# Patient Record
Sex: MALE | Race: WHITE | NOT HISPANIC OR LATINO | Employment: OTHER | URBAN - METROPOLITAN AREA
[De-identification: names, ages, dates, MRNs, and addresses within clinical notes are randomized per-mention and may not be internally consistent; named-entity substitution may affect disease eponyms.]

---

## 2017-06-06 ENCOUNTER — ALLSCRIPTS OFFICE VISIT (OUTPATIENT)
Dept: OTHER | Facility: OTHER | Age: 57
End: 2017-06-06

## 2017-06-06 DIAGNOSIS — Z13.6 ENCOUNTER FOR SCREENING FOR CARDIOVASCULAR DISORDERS: ICD-10-CM

## 2017-06-06 DIAGNOSIS — Z12.11 ENCOUNTER FOR SCREENING FOR MALIGNANT NEOPLASM OF COLON: ICD-10-CM

## 2017-06-06 DIAGNOSIS — F10.20 UNCOMPLICATED ALCOHOL DEPENDENCE (HCC): ICD-10-CM

## 2017-06-06 DIAGNOSIS — I10 ESSENTIAL (PRIMARY) HYPERTENSION: ICD-10-CM

## 2017-06-07 ENCOUNTER — APPOINTMENT (OUTPATIENT)
Dept: LAB | Facility: CLINIC | Age: 57
End: 2017-06-07
Payer: MEDICARE

## 2017-06-07 ENCOUNTER — TRANSCRIBE ORDERS (OUTPATIENT)
Dept: LAB | Facility: CLINIC | Age: 57
End: 2017-06-07

## 2017-06-07 DIAGNOSIS — Z13.6 ENCOUNTER FOR SCREENING FOR CARDIOVASCULAR DISORDERS: ICD-10-CM

## 2017-06-07 DIAGNOSIS — F10.20 UNCOMPLICATED ALCOHOL DEPENDENCE (HCC): ICD-10-CM

## 2017-06-07 DIAGNOSIS — I10 ESSENTIAL (PRIMARY) HYPERTENSION: ICD-10-CM

## 2017-06-07 LAB
ALBUMIN SERPL BCP-MCNC: 3.8 G/DL (ref 3.5–5)
ALP SERPL-CCNC: 70 U/L (ref 46–116)
ALT SERPL W P-5'-P-CCNC: 25 U/L (ref 12–78)
ANION GAP SERPL CALCULATED.3IONS-SCNC: 8 MMOL/L (ref 4–13)
AST SERPL W P-5'-P-CCNC: 20 U/L (ref 5–45)
BASOPHILS # BLD AUTO: 0.07 THOUSANDS/ΜL (ref 0–0.1)
BASOPHILS NFR BLD AUTO: 1 % (ref 0–1)
BILIRUB SERPL-MCNC: 0.44 MG/DL (ref 0.2–1)
BUN SERPL-MCNC: 12 MG/DL (ref 5–25)
CALCIUM SERPL-MCNC: 9.6 MG/DL (ref 8.3–10.1)
CHLORIDE SERPL-SCNC: 95 MMOL/L (ref 100–108)
CHOLEST SERPL-MCNC: 216 MG/DL (ref 50–200)
CO2 SERPL-SCNC: 30 MMOL/L (ref 21–32)
CREAT SERPL-MCNC: 1.09 MG/DL (ref 0.6–1.3)
CREAT UR-MCNC: 66 MG/DL
EOSINOPHIL # BLD AUTO: 1.31 THOUSAND/ΜL (ref 0–0.61)
EOSINOPHIL NFR BLD AUTO: 12 % (ref 0–6)
ERYTHROCYTE [DISTWIDTH] IN BLOOD BY AUTOMATED COUNT: 12.4 % (ref 11.6–15.1)
EST. AVERAGE GLUCOSE BLD GHB EST-MCNC: 108 MG/DL
GFR SERPL CREATININE-BSD FRML MDRD: >60 ML/MIN/1.73SQ M
GLUCOSE P FAST SERPL-MCNC: 90 MG/DL (ref 65–99)
HBA1C MFR BLD: 5.4 % (ref 4.2–6.3)
HCT VFR BLD AUTO: 46.1 % (ref 36.5–49.3)
HDLC SERPL-MCNC: 60 MG/DL (ref 40–60)
HGB BLD-MCNC: 16.2 G/DL (ref 12–17)
LDLC SERPL CALC-MCNC: 138 MG/DL (ref 0–100)
LYMPHOCYTES # BLD AUTO: 3.91 THOUSANDS/ΜL (ref 0.6–4.47)
LYMPHOCYTES NFR BLD AUTO: 37 % (ref 14–44)
MCH RBC QN AUTO: 35.1 PG (ref 26.8–34.3)
MCHC RBC AUTO-ENTMCNC: 35.1 G/DL (ref 31.4–37.4)
MCV RBC AUTO: 100 FL (ref 82–98)
MICROALBUMIN UR-MCNC: 7.1 MG/L (ref 0–20)
MICROALBUMIN/CREAT 24H UR: 11 MG/G CREATININE (ref 0–30)
MONOCYTES # BLD AUTO: 1.03 THOUSAND/ΜL (ref 0.17–1.22)
MONOCYTES NFR BLD AUTO: 10 % (ref 4–12)
NEUTROPHILS # BLD AUTO: 4.22 THOUSANDS/ΜL (ref 1.85–7.62)
NEUTS SEG NFR BLD AUTO: 40 % (ref 43–75)
NRBC BLD AUTO-RTO: 0 /100 WBCS
PLATELET # BLD AUTO: 370 THOUSANDS/UL (ref 149–390)
PMV BLD AUTO: 9.8 FL (ref 8.9–12.7)
POTASSIUM SERPL-SCNC: 3.8 MMOL/L (ref 3.5–5.3)
PROT SERPL-MCNC: 8.4 G/DL (ref 6.4–8.2)
RBC # BLD AUTO: 4.62 MILLION/UL (ref 3.88–5.62)
SODIUM SERPL-SCNC: 133 MMOL/L (ref 136–145)
TRIGL SERPL-MCNC: 89 MG/DL
TSH SERPL DL<=0.05 MIU/L-ACNC: 1.2 UIU/ML (ref 0.36–3.74)
WBC # BLD AUTO: 10.57 THOUSAND/UL (ref 4.31–10.16)

## 2017-06-07 PROCEDURE — 84443 ASSAY THYROID STIM HORMONE: CPT

## 2017-06-07 PROCEDURE — 85025 COMPLETE CBC W/AUTO DIFF WBC: CPT

## 2017-06-07 PROCEDURE — 80061 LIPID PANEL: CPT

## 2017-06-07 PROCEDURE — 83036 HEMOGLOBIN GLYCOSYLATED A1C: CPT

## 2017-06-07 PROCEDURE — 82570 ASSAY OF URINE CREATININE: CPT

## 2017-06-07 PROCEDURE — 36415 COLL VENOUS BLD VENIPUNCTURE: CPT

## 2017-06-07 PROCEDURE — 82043 UR ALBUMIN QUANTITATIVE: CPT

## 2017-06-07 PROCEDURE — 80053 COMPREHEN METABOLIC PANEL: CPT

## 2017-06-12 ENCOUNTER — GENERIC CONVERSION - ENCOUNTER (OUTPATIENT)
Dept: OTHER | Facility: OTHER | Age: 57
End: 2017-06-12

## 2017-07-03 ENCOUNTER — ALLSCRIPTS OFFICE VISIT (OUTPATIENT)
Dept: OTHER | Facility: OTHER | Age: 57
End: 2017-07-03

## 2017-07-18 ENCOUNTER — ALLSCRIPTS OFFICE VISIT (OUTPATIENT)
Dept: OTHER | Facility: OTHER | Age: 57
End: 2017-07-18

## 2017-07-18 DIAGNOSIS — Z71.6 TOBACCO ABUSE COUNSELING: ICD-10-CM

## 2017-09-07 ENCOUNTER — GENERIC CONVERSION - ENCOUNTER (OUTPATIENT)
Dept: OTHER | Facility: OTHER | Age: 57
End: 2017-09-07

## 2017-09-07 DIAGNOSIS — E78.5 HYPERLIPIDEMIA: ICD-10-CM

## 2017-09-08 ENCOUNTER — APPOINTMENT (OUTPATIENT)
Dept: LAB | Facility: CLINIC | Age: 57
End: 2017-09-08
Payer: MEDICARE

## 2017-09-08 DIAGNOSIS — E78.5 HYPERLIPIDEMIA: ICD-10-CM

## 2017-09-08 LAB
ALBUMIN SERPL BCP-MCNC: 3.7 G/DL (ref 3.5–5)
ALP SERPL-CCNC: 74 U/L (ref 46–116)
ALT SERPL W P-5'-P-CCNC: 19 U/L (ref 12–78)
ANION GAP SERPL CALCULATED.3IONS-SCNC: 7 MMOL/L (ref 4–13)
AST SERPL W P-5'-P-CCNC: 19 U/L (ref 5–45)
BILIRUB SERPL-MCNC: 0.69 MG/DL (ref 0.2–1)
BUN SERPL-MCNC: 15 MG/DL (ref 5–25)
CALCIUM SERPL-MCNC: 9 MG/DL (ref 8.3–10.1)
CHLORIDE SERPL-SCNC: 98 MMOL/L (ref 100–108)
CHOLEST SERPL-MCNC: 153 MG/DL (ref 50–200)
CO2 SERPL-SCNC: 29 MMOL/L (ref 21–32)
CREAT SERPL-MCNC: 1.12 MG/DL (ref 0.6–1.3)
GFR SERPL CREATININE-BSD FRML MDRD: 72 ML/MIN/1.73SQ M
GLUCOSE P FAST SERPL-MCNC: 98 MG/DL (ref 65–99)
HDLC SERPL-MCNC: 70 MG/DL (ref 40–60)
LDLC SERPL CALC-MCNC: 69 MG/DL (ref 0–100)
POTASSIUM SERPL-SCNC: 3.6 MMOL/L (ref 3.5–5.3)
PROT SERPL-MCNC: 8.2 G/DL (ref 6.4–8.2)
SODIUM SERPL-SCNC: 134 MMOL/L (ref 136–145)
TRIGL SERPL-MCNC: 68 MG/DL

## 2017-09-08 PROCEDURE — 80061 LIPID PANEL: CPT

## 2017-09-08 PROCEDURE — 80053 COMPREHEN METABOLIC PANEL: CPT

## 2017-09-18 ENCOUNTER — GENERIC CONVERSION - ENCOUNTER (OUTPATIENT)
Dept: OTHER | Facility: OTHER | Age: 57
End: 2017-09-18

## 2018-01-10 NOTE — RESULT NOTES
Verified Results  (1) HEMOGLOBIN A1C 07Jun2017 07:53AM Siria Low    Order Number: [id number]     Test Name Result Flag Reference   HEMOGLOBIN A1C 5 4 %  4 2-6 3   EST  AVG  GLUCOSE 108 mg/dl       (1) LIPID PANEL FASTING W DIRECT LDL REFLEX [id number] 07:53AM [facility] Number: [id number]     Test Name Result Flag Reference   CHOLESTEROL 216 mg/dL H    LDL CHOLESTEROL CALCULATED 138 mg/dL H 0-100   Triglyceride:         Normal              <150 mg/dl       Borderline High    150-199 mg/dl       High               200-499 mg/dl       Very High          >499 mg/dl  Cholesterol:         Desirable        <200 mg/dl      Borderline High  200-239 mg/dl      High             >239 mg/dl  HDL Cholesterol:        High    >59 mg/dL      Low     <41 mg/dL  LDL Cholesterol:        Optimal          <100 mg/dl        Near Optimal     100-129 mg/dl        Above Optimal          Borderline High   130-159 mg/dl          High              160-189 mg/dl          Very High        >189 mg/dl  LDL CALCULATED:    This screening LDL is a calculated result  It does not have the accuracy of the Direct Measured LDL in the monitoring of patients with hyperlipidemia and/or statin therapy  Direct Measure LDL (ZWX122) must be ordered separately in these patients  TRIGLYCERIDES 89 mg/dL  <=150   Specimen collection should occur prior to N-Acetylcysteine or Metamizole administration due to the potential for falsely depressed results  HDL,DIRECT 60 mg/dL  40-60   Specimen collection should occur prior to Metamizole administration due to the potential for falsely depressed results  (1) TSH [id number] 07:53AM Siria Low    Order Number: [id number]     Test Name Result Flag Reference   TSH 1 200 uIU/mL  0 358-3 740   Patients undergoing fluorescein dye angiography may retain small amounts of fluorescein in the body for 48-72 hours post procedure   Samples containing fluorescein can produce falsely depressed TSH values  If the patient had this procedure,a specimen should be resubmitted post fluorescein clearance  (1) COMPREHENSIVE METABOLIC PANEL [id number] 77:78TQ Sarah Rosales    Order Number: [id number]     Test Name Result Flag Reference   SODIUM 133 mmol/L L 136-145   POTASSIUM 3 8 mmol/L  3 5-5 3   CHLORIDE 95 mmol/L L 100-108   CARBON DIOXIDE 30 mmol/L  21-32   ANION GAP (CALC) 8 mmol/L  4-13   BLOOD UREA NITROGEN 12 mg/dL  5-25   CREATININE 1 09 mg/dL  0 60-1 30   Standardized to IDMS reference method   CALCIUM 9 6 mg/dL  8 3-10 1   BILI, TOTAL 0 44 mg/dL  0 20-1 00   ALK PHOSPHATAS 70 U/L     ALT (SGPT) 25 U/L  12-78   AST(SGOT) 20 U/L  5-45   ALBUMIN 3 8 g/dL  3 5-5 0   TOTAL PROTEIN 8 4 g/dL H 6 4-8 2   eGFR Non-African American      >60 0 ml/min/1 73sq [facility] Disease Education Program recommendations are as follows:  GFR calculation is accurate only with a steady state creatinine  Chronic Kidney disease less than 60 ml/min/1 73 sq  meters  Kidney failure less than 15 ml/min/1 73 sq  meters  GLUCOSE FASTING 90 mg/dL  65-99     (1) CBC/PLT/DIFF [id number] 07:53AM [facility] Order Number: [id number]     Test Name Result Flag Reference   WBC COUNT 10 57 Thousand/uL H 4 31-10 16   RBC COUNT 4 62 Million/uL  3 88-5 62   HEMOGLOBIN 16 2 g/dL  12 0-17 0   HEMATOCRIT 46 1 %  36 5-49 3    fL H 82-98   MCH 35 1 pg H 26 8-34 3   MCHC 35 1 g/dL  31 4-37 4   RDW 12 4 %  11 6-15 1   MPV 9 8 fL  8 9-12 7   PLATELET COUNT 969 Thousands/uL  149-390   nRBC AUTOMATED 0 /100 WBCs     NEUTROPHILS RELATIVE PERCENT 40 % L 43-75   LYMPHOCYTES RELATIVE PERCENT 37 %  14-44   MONOCYTES RELATIVE PERCENT 10 %  4-12   EOSINOPHILS RELATIVE PERCENT 12 % H 0-6   BASOPHILS RELATIVE PERCENT 1 %  0-1   NEUTROPHILS ABSOLUTE COUNT 4 22 Thousands/? ??L  1 85-7 62   LYMPHOCYTES ABSOLUTE COUNT 3 91 Thousands/? ??L  0 60-4 47   MONOCYTES ABSOLUTE COUNT 1 03 Thousand/? ??L  0 17-1 22   EOSINOPHILS ABSOLUTE COUNT 1 31 Thousand/? ??L H 0 00-0 61   BASOPHILS ABSOLUTE COUNT 0 07 Thousands/? ??L  0 00-0 10     (1) MICROALBUMIN CREATININE RATIO, RANDOM URINE 07Jun2017 07:53AM Fartun Reynoso Order Number: [id number]     Test Name Result Flag Reference   MICROALBUMIN/ CREAT R 11 mg/g creatinine  0-30   MICROALBUMIN,URINE 7 1 mg/L  0 0-20 0   CREATININE URINE 66 0 mg/dL

## 2018-01-12 VITALS
RESPIRATION RATE: 16 BRPM | OXYGEN SATURATION: 97 % | SYSTOLIC BLOOD PRESSURE: 118 MMHG | HEIGHT: 70 IN | HEART RATE: 78 BPM | TEMPERATURE: 96.5 F | DIASTOLIC BLOOD PRESSURE: 70 MMHG | BODY MASS INDEX: 23.48 KG/M2 | WEIGHT: 164 LBS

## 2018-01-13 VITALS
WEIGHT: 165 LBS | DIASTOLIC BLOOD PRESSURE: 80 MMHG | OXYGEN SATURATION: 98 % | BODY MASS INDEX: 23.62 KG/M2 | RESPIRATION RATE: 18 BRPM | HEART RATE: 62 BPM | HEIGHT: 70 IN | SYSTOLIC BLOOD PRESSURE: 130 MMHG
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
## 2018-01-13 NOTE — MISCELLANEOUS
Message  I spoke to patient re labs will f/u with pcp      Signatures   Electronically signed by : Priscille Seip, M D ; Sep 18 2017 12:42PM EST                       (Author)

## 2018-01-14 VITALS
WEIGHT: 167 LBS | SYSTOLIC BLOOD PRESSURE: 122 MMHG | HEART RATE: 72 BPM | RESPIRATION RATE: 16 BRPM | DIASTOLIC BLOOD PRESSURE: 72 MMHG | BODY MASS INDEX: 23.91 KG/M2 | HEIGHT: 70 IN | TEMPERATURE: 97.8 F

## 2018-01-16 NOTE — RESULT NOTES
Message  Call patient regarding CMP results  Discussed with patient regarding normal results except for elevated creatinine at 1 34  Patient states that he has not been drinking enough water in the last few weeks  I advised patient that he should drink at least 8-10 glasses of water daily  Patient states that he likes to drink Gatorade  I advised patient that Gatorade has a lot of sugar and if he really wants to he can dilute it otherwise he should drink just water  Patient today denies any swelling of the legs, chest pain, decreased urination, or shortness of breath  Advised patient to return to clinic if he develops any new symptoms  Patient acknowledged all information  Verified Results  (1) COMPREHENSIVE METABOLIC PANEL [id number] 84:47ZM Raine Merino Order Number: [id number]     Test Name Result Flag Reference   GLUCOSE,RANDM 89 mg/dL     If the patient is fasting, the ADA then defines impaired fasting glucose as > 100 mg/dL and diabetes as > or equal to 123 mg/dL  SODIUM 138 mmol/L  136-145   POTASSIUM 3 8 mmol/L  3 5-5 3   CHLORIDE 102 mmol/L  100-108   CARBON DIOXIDE 28 mmol/L  21-32   ANION GAP (CALC) 8 mmol/L  4-13   BLOOD UREA NITROGEN 20 mg/dL  5-25   CREATININE 1 34 mg/dL H 0 60-1 30   Standardized to IDMS reference method   CALCIUM 9 6 mg/dL  8 3-10 1   BILI, TOTAL 0 62 mg/dL  0 20-1 00   ALK PHOSPHATAS 71 U/L     ALT (SGPT) 24 U/L  12-78   AST(SGOT) 17 U/L  5-45   ALBUMIN 3 9 g/dL  3 5-5 0   TOTAL PROTEIN 8 4 g/dL H 6 4-8 2   eGFR Non-African American 54 9 ml/min/1 73sq [facility] Disease Education Program recommendations are as follows:  GFR calculation is accurate only with a steady state creatinine  Chronic Kidney disease less than 60 ml/min/1 73 sq  meters  Kidney failure less than 15 ml/min/1 73 sq  meters  Signatures   Electronically signed by :  ALBER Fortune ; Dec 13 2016  5:25PM EST                       (Author)

## 2018-01-22 VITALS
WEIGHT: 166 LBS | HEART RATE: 70 BPM | RESPIRATION RATE: 18 BRPM | SYSTOLIC BLOOD PRESSURE: 122 MMHG | HEIGHT: 70 IN | OXYGEN SATURATION: 99 % | TEMPERATURE: 97.6 F | DIASTOLIC BLOOD PRESSURE: 80 MMHG | BODY MASS INDEX: 23.77 KG/M2

## 2018-04-03 ENCOUNTER — OFFICE VISIT (OUTPATIENT)
Dept: FAMILY MEDICINE CLINIC | Facility: CLINIC | Age: 58
End: 2018-04-03
Payer: MEDICARE

## 2018-04-03 ENCOUNTER — TELEPHONE (OUTPATIENT)
Dept: GASTROENTEROLOGY | Facility: CLINIC | Age: 58
End: 2018-04-03

## 2018-04-03 VITALS
TEMPERATURE: 98.1 F | BODY MASS INDEX: 24.09 KG/M2 | HEIGHT: 70 IN | DIASTOLIC BLOOD PRESSURE: 68 MMHG | OXYGEN SATURATION: 98 % | HEART RATE: 66 BPM | WEIGHT: 168.31 LBS | RESPIRATION RATE: 18 BRPM | SYSTOLIC BLOOD PRESSURE: 120 MMHG

## 2018-04-03 DIAGNOSIS — F10.29 ALCOHOL DEPENDENCE WITH UNSPECIFIED ALCOHOL-INDUCED DISORDER (HCC): ICD-10-CM

## 2018-04-03 DIAGNOSIS — I10 HYPERTENSION, UNSPECIFIED TYPE: ICD-10-CM

## 2018-04-03 DIAGNOSIS — R53.83 FATIGUE, UNSPECIFIED TYPE: Primary | ICD-10-CM

## 2018-04-03 DIAGNOSIS — Z12.11 SCREENING FOR COLON CANCER: ICD-10-CM

## 2018-04-03 PROBLEM — R59.9 ENLARGED LYMPH NODES: Status: ACTIVE | Noted: 2017-06-06

## 2018-04-03 PROBLEM — E78.5 HYPERLIPIDEMIA: Status: ACTIVE | Noted: 2017-07-06

## 2018-04-03 PROBLEM — Z72.0 TOBACCO ABUSE: Status: ACTIVE | Noted: 2017-03-06

## 2018-04-03 PROCEDURE — 99213 OFFICE O/P EST LOW 20 MIN: CPT | Performed by: FAMILY MEDICINE

## 2018-04-03 RX ORDER — LOVASTATIN 20 MG/1
40 TABLET ORAL DAILY
COMMUNITY
Start: 2017-07-18 | End: 2018-05-04

## 2018-04-03 RX ORDER — CITALOPRAM 20 MG/1
20 TABLET ORAL EVERY MORNING
COMMUNITY
Start: 2016-06-03 | End: 2018-07-07 | Stop reason: SDUPTHER

## 2018-04-03 NOTE — ASSESSMENT & PLAN NOTE
Blood pressure is at goal today  Will continue lisinopril-hydrochlorothiazide 20-25 mg daily  Will reorder metoprolol tartrate 25 mg b i d  with refills

## 2018-04-03 NOTE — TELEPHONE ENCOUNTER
Radha Gonzalez  1960  [address] [facility] [address] [facility]  [phone]  Cell Phone [phone]    Screened by: [facility]    Referring Dr : Carlito Toure    Pre- Screening:   Has patient been referred for a routine screening Colonoscopy? yes  Is the patient over 48years of age? yes    If the answer is YES to both questions, proceed to the medical questions  Do you have any of the following symptoms? Have you had a coronary or vascular stent within the last year? no    Have you had a heart attack or stroke in the last 6 months? no    Have you had intestinal surgery in the last 3 months? no    Do you have problems with:    Do you use:  Oxygen no  CPAP/BiPAP no    Have you been hospitalized in the last Month? no    Have you been diagnosed with a bleeding disorder or anemia? no    Have you had chest pain (angina) or breathing problems  (COPD) in the last 3 months? no     Do you have any difficulty walking up a flight of stairs? no    Have you had Kidney failure or insufficiency? no    Have you had heart valve surgery? no    Are you confined to a wheelchair? no    Do you take     Do you take insulin for Diabetes no  Name of medication:    : If patient answers NO to medical questions, then schedule procedure  If patient answers YES to medical questions, then schedule office appointment  Previous Colonoscopy no   (if yes) Date and Facility of last colonoscopy? Patient scheduled for procedure:   Scheduled by:     Time:   Provider:   Location:     Insurance:   Referral Required? Were instructions Mailed? Were instructions sent to [employer] network:   Was the prep sent to Pharmacy?      Comments: Nelida Hernandez

## 2018-04-03 NOTE — PROGRESS NOTES
Assessment/Plan:    Hypertension  Blood pressure is at goal today  Will continue lisinopril-hydrochlorothiazide 20-25 mg daily  Will reorder metoprolol tartrate 25 mg b i d  with refills  Fatigue  Fatigue is likely 2/2 to alcohol abuse and over sleeping  For completeness will check TSH, CBC, BMP to rule out any thyroid, anemia, electrolyte disorder  I counseled patient on alcohol cessation  I also counseled patient on sleeping 8-10 hours a night  I counseled patient on short-term goals throughout the day to fill this time  I provided patient with pamphlet with contact information for family guidance and [address] for mental health counseling services  Will follow up in 1 month for evaluation of patient's progress  Alcohol dependence with unspecified alcohol-induced disorder ([facility] [address] )  Patient drinks between 8-10 beers nightly  This has been a chronic issue  I counseled patient that he should consider alcohol cessation  Patient is contemplating quitting alcohol  He understands and acknowledges that alcohol may be a component of his fatigue  Patient agrees that he should attempt to wean himself off alcohol  The patient is contemplating but not yet ready for active plan for alcohol cessation  Patient declines Alcohol anonymous services or other group counseling services  Advised that if patient does decide quit he should not abruptly stop alcohol intake as this can lead to withdrawal and serious side effects such as delirium tremens, seizures, and ultimately death  Will follow up in 1 month for evaluation  Diagnoses and all orders for this visit:    Fatigue, unspecified type  -     TSH, 3rd generation with T4 reflex; Future  -     Basic metabolic panel; Future  -     CBC and Platelet; Future    Screening for colon cancer  -     Ambulatory referral to Gastroenterology; Future  -     Ambulatory referral to Gastroenterology;  Future    Hypertension, unspecified type  -     metoprolol tartrate (LOPRESSOR) 25 mg tablet; Take 1 tablet (25 mg total) by mouth 2 (two) times a day  -     Basic metabolic panel; Future  -     CBC and Platelet; Future    Alcohol dependence with unspecified alcohol-induced disorder ([facility] [address] )    Other orders  -     lovastatin (MEVACOR) 20 mg tablet; Take 2 tablets by mouth daily  -     citalopram (CeleXA) 20 mg tablet; Take 1 tablet by mouth daily          Subjective:      Patient ID: Maria Esther Sanchez is a 61 y o  male  57-y/o male, PMHx hypertension, anxiety with social phobia, severe depression, presents to clinic for evaluation of fatigue  Patient states that he has been tired most days  Patient reports that he drinks 8-10 beers and night  Patient states that he will sleep upwards of 15 hours a day  Patient has no problem getting to sleep  He will wake up intermittently in the morning and fall back asleep for a couple more hours at a time  Patient is on disability and he states that he really just does not have anything to do so he spends his days drinking alcohol on sleeping  Patient is also here for evaluation of blood pressure  He states that he is compliant with his medication  He takes his blood pressure at home with readings in 120s over 60s  The following portions of the patient's history were reviewed and updated as appropriate: allergies, current medications, past family history, past medical history, past social history, past surgical history and problem list     Review of Systems   Constitutional: Negative for activity change, appetite change, chills, diaphoresis, fatigue and fever  HENT: Negative  Respiratory: Negative  Cardiovascular: Negative  Gastrointestinal: Negative  Endocrine: Negative for cold intolerance, heat intolerance, polydipsia, polyphagia and polyuria  Genitourinary: Negative  Musculoskeletal: Negative  Skin: Negative  Allergic/Immunologic: Negative  Neurological: Negative  Hematological: Negative  Psychiatric/Behavioral: Positive for decreased concentration and dysphoric mood  Negative for agitation, behavioral problems, confusion, hallucinations, self-injury, sleep disturbance and suicidal ideas  The patient is nervous/anxious  The patient is not hyperactive  Objective:      /68 (BP Location: Left arm, Patient Position: Sitting)   Pulse 66   Temp 98 1 °F (36 7 °C) (Tympanic)   Resp 18   Ht 5' 10" (1 778 m)   Wt 76 3 kg (168 lb 5 oz)   SpO2 98%   BMI 24 15 kg/m²          Physical Exam   Constitutional: He is oriented to person, place, and time  He appears well-developed and well-nourished  No distress  HENT:   Head: Normocephalic and atraumatic  Right Ear: External ear normal    Left Ear: External ear normal    Mouth/Throat: No oropharyngeal exudate  Eyes: Conjunctivae and EOM are normal  Pupils are equal, round, and reactive to light  No scleral icterus  Neck: Normal range of motion  Neck supple  No JVD present  No tracheal deviation present  No thyromegaly present  Cardiovascular: Normal rate, regular rhythm and normal heart sounds  Exam reveals no gallop and no friction rub  No murmur heard  Pulmonary/Chest: Effort normal and breath sounds normal  No stridor  No respiratory distress  He has no wheezes  He has no rales  Lymphadenopathy:     He has no cervical adenopathy  Neurological: He is alert and oriented to person, place, and time  Skin: He is not diaphoretic

## 2018-04-03 NOTE — ASSESSMENT & PLAN NOTE
Fatigue is likely 2/2 to alcohol abuse and over sleeping  For completeness will check TSH, CBC, BMP to rule out any thyroid, anemia, electrolyte disorder  I counseled patient on alcohol cessation  I also counseled patient on sleeping 8-10 hours a night  I counseled patient on short-term goals throughout the day to fill this time  I provided patient with pamphlet with contact information for family guidance and [address] for mental health counseling services  Will follow up in 1 month for evaluation of patient's progress

## 2018-04-03 NOTE — ASSESSMENT & PLAN NOTE
Patient drinks between 8-10 beers nightly  This has been a chronic issue  I counseled patient that he should consider alcohol cessation  Patient is contemplating quitting alcohol  He understands and acknowledges that alcohol may be a component of his fatigue  Patient agrees that he should attempt to wean himself off alcohol  The patient is contemplating but not yet ready for active plan for alcohol cessation  Patient declines Alcohol anonymous services or other group counseling services  Advised that if patient does decide quit he should not abruptly stop alcohol intake as this can lead to withdrawal and serious side effects such as delirium tremens, seizures, and ultimately death  Will follow up in 1 month for evaluation

## 2018-04-04 ENCOUNTER — APPOINTMENT (OUTPATIENT)
Dept: LAB | Facility: CLINIC | Age: 58
End: 2018-04-04
Payer: MEDICARE

## 2018-04-04 DIAGNOSIS — I10 HYPERTENSION, UNSPECIFIED TYPE: ICD-10-CM

## 2018-04-04 DIAGNOSIS — R53.83 FATIGUE, UNSPECIFIED TYPE: ICD-10-CM

## 2018-04-04 LAB
ANION GAP SERPL CALCULATED.3IONS-SCNC: 4 MMOL/L (ref 4–13)
BUN SERPL-MCNC: 12 MG/DL (ref 5–25)
CALCIUM SERPL-MCNC: 9 MG/DL (ref 8.3–10.1)
CHLORIDE SERPL-SCNC: 99 MMOL/L (ref 100–108)
CO2 SERPL-SCNC: 31 MMOL/L (ref 21–32)
CREAT SERPL-MCNC: 1.01 MG/DL (ref 0.6–1.3)
ERYTHROCYTE [DISTWIDTH] IN BLOOD BY AUTOMATED COUNT: 12.6 % (ref 11.6–15.1)
GFR SERPL CREATININE-BSD FRML MDRD: 82 ML/MIN/1.73SQ M
GLUCOSE P FAST SERPL-MCNC: 73 MG/DL (ref 65–99)
HCT VFR BLD AUTO: 41 % (ref 36.5–49.3)
HGB BLD-MCNC: 14.8 G/DL (ref 12–17)
MCH RBC QN AUTO: 34.7 PG (ref 26.8–34.3)
MCHC RBC AUTO-ENTMCNC: 36.1 G/DL (ref 31.4–37.4)
MCV RBC AUTO: 96 FL (ref 82–98)
PLATELET # BLD AUTO: 368 THOUSANDS/UL (ref 149–390)
PMV BLD AUTO: 9.6 FL (ref 8.9–12.7)
POTASSIUM SERPL-SCNC: 3.9 MMOL/L (ref 3.5–5.3)
RBC # BLD AUTO: 4.26 MILLION/UL (ref 3.88–5.62)
SODIUM SERPL-SCNC: 134 MMOL/L (ref 136–145)
TSH SERPL DL<=0.05 MIU/L-ACNC: 1.39 UIU/ML (ref 0.36–3.74)
WBC # BLD AUTO: 11.1 THOUSAND/UL (ref 4.31–10.16)

## 2018-04-04 PROCEDURE — 36415 COLL VENOUS BLD VENIPUNCTURE: CPT

## 2018-04-04 PROCEDURE — 85027 COMPLETE CBC AUTOMATED: CPT

## 2018-04-04 PROCEDURE — 80048 BASIC METABOLIC PNL TOTAL CA: CPT

## 2018-04-04 PROCEDURE — 84443 ASSAY THYROID STIM HORMONE: CPT

## 2018-04-05 DIAGNOSIS — Z12.11 COLON CANCER SCREENING: Primary | ICD-10-CM

## 2018-04-05 NOTE — TELEPHONE ENCOUNTER
Pt is scheduled for open access colonoscopy w/ dr Ana Corley at [facility] on 5/7/2018 bowel perp ordered by margareth/krys mailed

## 2018-04-16 ENCOUNTER — TELEPHONE (OUTPATIENT)
Dept: GASTROENTEROLOGY | Facility: CLINIC | Age: 58
End: 2018-04-16

## 2018-05-07 ENCOUNTER — ANESTHESIA EVENT (OUTPATIENT)
Dept: GASTROENTEROLOGY | Facility: AMBULARY SURGERY CENTER | Age: 58
End: 2018-05-07

## 2018-05-07 ENCOUNTER — ANESTHESIA (OUTPATIENT)
Dept: GASTROENTEROLOGY | Facility: AMBULARY SURGERY CENTER | Age: 58
End: 2018-05-07

## 2018-05-18 DIAGNOSIS — E78.49 OTHER HYPERLIPIDEMIA: Primary | ICD-10-CM

## 2018-05-21 RX ORDER — LOVASTATIN 20 MG/1
TABLET ORAL
Qty: 60 TABLET | Refills: 3 | Status: SHIPPED | OUTPATIENT
Start: 2018-05-21 | End: 2018-08-07

## 2018-06-28 RX ORDER — CITALOPRAM 20 MG/1
TABLET ORAL
Qty: 90 TABLET | Refills: 1 | Status: CANCELLED | OUTPATIENT
Start: 2018-06-28

## 2018-07-07 DIAGNOSIS — F32.A DEPRESSION: Primary | ICD-10-CM

## 2018-07-07 RX ORDER — CITALOPRAM 20 MG/1
20 TABLET ORAL EVERY MORNING
Qty: 90 TABLET | Refills: 1 | Status: SHIPPED | OUTPATIENT
Start: 2018-07-07 | End: 2018-07-09 | Stop reason: SDUPTHER

## 2018-07-09 DIAGNOSIS — F32.A DEPRESSION, UNSPECIFIED DEPRESSION TYPE: ICD-10-CM

## 2018-07-09 RX ORDER — CITALOPRAM 20 MG/1
20 TABLET ORAL EVERY MORNING
Qty: 90 TABLET | Refills: 1 | Status: SHIPPED | OUTPATIENT
Start: 2018-07-09 | End: 2018-10-04 | Stop reason: SDUPTHER

## 2018-07-11 ENCOUNTER — APPOINTMENT (EMERGENCY)
Dept: RADIOLOGY | Facility: HOSPITAL | Age: 58
End: 2018-07-11
Payer: MEDICARE

## 2018-07-11 ENCOUNTER — HOSPITAL ENCOUNTER (EMERGENCY)
Facility: HOSPITAL | Age: 58
Discharge: HOME | End: 2018-07-11
Attending: EMERGENCY MEDICINE
Payer: MEDICARE

## 2018-07-11 VITALS
HEIGHT: 70 IN | WEIGHT: 160 LBS | BODY MASS INDEX: 22.9 KG/M2 | RESPIRATION RATE: 16 BRPM | SYSTOLIC BLOOD PRESSURE: 164 MMHG | TEMPERATURE: 98.6 F | HEART RATE: 78 BPM | OXYGEN SATURATION: 97 % | DIASTOLIC BLOOD PRESSURE: 64 MMHG

## 2018-07-11 DIAGNOSIS — N49.2 CELLULITIS, SCROTUM: Primary | ICD-10-CM

## 2018-07-11 DIAGNOSIS — N43.3 HYDROCELE: ICD-10-CM

## 2018-07-11 DIAGNOSIS — N50.3 EPIDIDYMAL CYST: ICD-10-CM

## 2018-07-11 LAB
ANION GAP SERPL CALCULATED.3IONS-SCNC: 11 MMOL/L (ref 4–13)
BASOPHILS # BLD AUTO: 0.09 THOUSANDS/ΜL (ref 0–0.1)
BASOPHILS NFR BLD AUTO: 1 % (ref 0–1)
BILIRUB UR QL STRIP: NEGATIVE
BUN SERPL-MCNC: 15 MG/DL (ref 5–25)
CALCIUM SERPL-MCNC: 8.5 MG/DL (ref 8.3–10.1)
CHLORIDE SERPL-SCNC: 93 MMOL/L (ref 100–108)
CLARITY UR: CLEAR
CO2 SERPL-SCNC: 25 MMOL/L (ref 21–32)
COLOR UR: NORMAL
CREAT SERPL-MCNC: 1.2 MG/DL (ref 0.6–1.3)
EOSINOPHIL # BLD AUTO: 0.42 THOUSAND/ΜL (ref 0–0.61)
EOSINOPHIL NFR BLD AUTO: 4 % (ref 0–6)
ERYTHROCYTE [DISTWIDTH] IN BLOOD BY AUTOMATED COUNT: 11.6 % (ref 11.6–15.1)
GFR SERPL CREATININE-BSD FRML MDRD: 66 ML/MIN/1.73SQ M
GLUCOSE SERPL-MCNC: 81 MG/DL (ref 65–140)
GLUCOSE UR STRIP-MCNC: NEGATIVE MG/DL
HCT VFR BLD AUTO: 40.7 % (ref 36.5–49.3)
HGB BLD-MCNC: 14.4 G/DL (ref 12–17)
HGB UR QL STRIP.AUTO: NEGATIVE
IMM GRANULOCYTES # BLD AUTO: 0.03 THOUSAND/UL (ref 0–0.2)
IMM GRANULOCYTES NFR BLD AUTO: 0 % (ref 0–2)
KETONES UR STRIP-MCNC: NEGATIVE MG/DL
LEUKOCYTE ESTERASE UR QL STRIP: NEGATIVE
LYMPHOCYTES # BLD AUTO: 3.52 THOUSANDS/ΜL (ref 0.6–4.47)
LYMPHOCYTES NFR BLD AUTO: 33 % (ref 14–44)
MCH RBC QN AUTO: 33.7 PG (ref 26.8–34.3)
MCHC RBC AUTO-ENTMCNC: 35.4 G/DL (ref 31.4–37.4)
MCV RBC AUTO: 95 FL (ref 82–98)
MONOCYTES # BLD AUTO: 1.24 THOUSAND/ΜL (ref 0.17–1.22)
MONOCYTES NFR BLD AUTO: 12 % (ref 4–12)
NEUTROPHILS # BLD AUTO: 5.51 THOUSANDS/ΜL (ref 1.85–7.62)
NEUTS SEG NFR BLD AUTO: 50 % (ref 43–75)
NITRITE UR QL STRIP: NEGATIVE
NRBC BLD AUTO-RTO: 0 /100 WBCS
PH UR STRIP.AUTO: 6 [PH] (ref 5–9)
PLATELET # BLD AUTO: 335 THOUSANDS/UL (ref 149–390)
PMV BLD AUTO: 8.7 FL (ref 8.9–12.7)
POTASSIUM SERPL-SCNC: 3.6 MMOL/L (ref 3.5–5.3)
PROT UR STRIP-MCNC: NEGATIVE MG/DL
RBC # BLD AUTO: 4.27 MILLION/UL (ref 3.88–5.62)
SODIUM SERPL-SCNC: 129 MMOL/L (ref 136–145)
SP GR UR STRIP.AUTO: <=1.005 (ref 1–1.03)
UROBILINOGEN UR QL STRIP.AUTO: 0.2 E.U./DL
WBC # BLD AUTO: 10.81 THOUSAND/UL (ref 4.31–10.16)

## 2018-07-11 PROCEDURE — 96365 THER/PROPH/DIAG IV INF INIT: CPT

## 2018-07-11 PROCEDURE — 99284 EMERGENCY DEPT VISIT MOD MDM: CPT

## 2018-07-11 PROCEDURE — 80048 BASIC METABOLIC PNL TOTAL CA: CPT | Performed by: EMERGENCY MEDICINE

## 2018-07-11 PROCEDURE — 76870 US EXAM SCROTUM: CPT

## 2018-07-11 PROCEDURE — 36415 COLL VENOUS BLD VENIPUNCTURE: CPT | Performed by: EMERGENCY MEDICINE

## 2018-07-11 PROCEDURE — 81003 URINALYSIS AUTO W/O SCOPE: CPT | Performed by: EMERGENCY MEDICINE

## 2018-07-11 PROCEDURE — 85025 COMPLETE CBC W/AUTO DIFF WBC: CPT | Performed by: EMERGENCY MEDICINE

## 2018-07-11 RX ORDER — CEPHALEXIN 500 MG/1
500 CAPSULE ORAL EVERY 6 HOURS SCHEDULED
Qty: 40 CAPSULE | Refills: 0 | Status: SHIPPED | OUTPATIENT
Start: 2018-07-11 | End: 2018-07-21

## 2018-07-11 RX ADMIN — CEFAZOLIN SODIUM 2000 MG: 2 SOLUTION INTRAVENOUS at 20:36
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
## 2018-07-11 NOTE — ED NOTES
Returned from ultrasound  Resting comfortably   No complaints     [facility]  07/11/18 4641

## 2018-07-12 ENCOUNTER — VBI (OUTPATIENT)
Dept: FAMILY MEDICINE CLINIC | Facility: CLINIC | Age: 58
End: 2018-07-12

## 2018-07-12 NOTE — DISCHARGE INSTRUCTIONS
Hydrocele   WHAT YOU NEED TO KNOW:   A hydrocele is a collection of fluid inside the scrotum  The scrotum holds the testicles  Hydroceles are simple or communicating  A simple hydrocele stays the same size  A communicating hydrocele gets bigger and smaller as fluid flows into and out of the scrotum  DISCHARGE INSTRUCTIONS:   Medicines:   · Pain medicine: You may need medicine to take away or decrease pain  ¨ Learn how to take your medicine  Ask what medicine and how much you should take  Be sure you know how, when, and how often to take it  ¨ Do not wait until the pain is severe before you take your medicine  Tell caregivers if your pain does not decrease  ¨ Pain medicine can make you dizzy or sleepy  Prevent falls by calling someone when you get out of bed or if you need help  · Take your medicine as directed  Contact your healthcare provider if you think your medicine is not helping or if you have side effects  Tell him of her if you are allergic to any medicine  Keep a list of the medicines, vitamins, and herbs you take  Include the amounts, and when and why you take them  Bring the list or the pill bottles to follow-up visits  Carry your medicine list with you in case of an emergency  Follow up with your healthcare provider or urologist as directed:  Write down your questions so you remember to ask them during your visits  Support: You may need to wear a fabric support device similar to a jock strap to decrease swelling  Contact your healthcare provider or urologist if:   · The swelling gets worse or does not go away  · You have questions or concerns about your condition or care  Return to the emergency department if:   · You have severe pain in your scrotum  · You have a fever and your scrotum is red and swollen  © 2017 [employer] Information is for End User's use only and may not be sold, redistributed or otherwise used for commercial purposes   All illustrations and images included in CareNotes® are the copyrighted property of [employer]  or Harsh Donnelly  The above information is an  only  It is not intended as medical advice for individual conditions or treatments  Talk to your doctor, nurse or pharmacist before following any medical regimen to see if it is safe and effective for you  Cellulitis   WHAT YOU NEED TO KNOW:   Cellulitis is a skin infection caused by bacteria  Cellulitis may go away on its own or you may need treatment  Your healthcare provider may draw a [place] around the outside edges of your cellulitis  If your cellulitis spreads, your healthcare provider will see it outside of the [place]  DISCHARGE INSTRUCTIONS:   Call 911 if:   · You have sudden trouble breathing or chest pain  Return to the emergency department if:   · Your wound gets larger and more painful  · You feel a crackling under your skin when you touch it  · You have purple dots or bumps on your skin, or you see bleeding under your skin  · You have new swelling and pain in your legs  · The red, warm, swollen area gets larger  · You see red streaks coming from the infected area  Contact your healthcare provider if:   · You have a fever  · Your fever or pain does not go away or gets worse  · The area does not get smaller after 2 days of antibiotics  · Your skin is flaking or peeling off  · You have questions or concerns about your condition or care  Medicines:   · Antibiotics  help treat the bacterial infection  · NSAIDs , such as ibuprofen, help decrease swelling, pain, and fever  NSAIDs can cause stomach bleeding or kidney problems in certain people  If you take blood thinner medicine, always ask if NSAIDs are safe for you  Always read the medicine label and follow directions  Do not give these medicines to children under 10months of age without direction from your child's healthcare provider       · Acetaminophen decreases pain and fever  It is available without a doctor's order  Ask how much to take and how often to take it  Follow directions  Read the labels of all other medicines you are using to see if they also contain acetaminophen, or ask your doctor or pharmacist  Acetaminophen can cause liver damage if not taken correctly  Do not use more than 4 grams (4,000 milligrams) total of acetaminophen in one day  · Take your medicine as directed  Contact your healthcare provider if you think your medicine is not helping or if you have side effects  Tell him or her if you are allergic to any medicine  Keep a list of the medicines, vitamins, and herbs you take  Include the amounts, and when and why you take them  Bring the list or the pill bottles to follow-up visits  Carry your medicine list with you in case of an emergency  Self-care:   · Elevate the area above the level of your heart  as often as you can  This will help decrease swelling and pain  Prop the area on pillows or blankets to keep it elevated comfortably  · Clean the area daily until the wound scabs over  Gently wash the area with soap and water  Pat dry  Use dressings as directed  · Place cool or warm, wet cloths on the area as directed  Use clean cloths and clean water  Leave it on the area until the cloth is room temperature  Pat the area dry with a clean, dry cloth  The cloths may help decrease pain  Prevent cellulitis:   · Do not scratch bug bites or areas of injury  You increase your risk for cellulitis by scratching these areas  · Do not share personal items, such as towels, clothing, and razors  · Clean exercise equipment  with germ-killing  before and after you use it  · Wash your hands often  Use soap and water  Wash your hands after you use the bathroom, change a child's diapers, or sneeze  Wash your hands before you prepare or eat food  Use lotion to prevent dry, cracked skin             · Wear pressure stockings as directed  You may be told to wear the stockings if you have peripheral edema  The stockings improve blood flow and decrease swelling  · Treat athlete's foot  This can help prevent the spread of a bacterial skin infection  Follow up with your healthcare provider within 3 days, or as directed: Your healthcare provider will check if your cellulitis is getting better  You may need different medicine  Write down your questions so you remember to ask them during your visits  © 2017 [facility] [employer] Information is for End User's use only and may not be sold, redistributed or otherwise used for commercial purposes  All illustrations and images included in CareNotes® are the copyrighted property of [employer] M , Inc  or Harsh Donnelly  The above information is an  only  It is not intended as medical advice for individual conditions or treatments  Talk to your doctor, nurse or pharmacist before following any medical regimen to see if it is safe and effective for you

## 2018-07-12 NOTE — TELEPHONE ENCOUNTER
Pt was seen in [address] on 7/11/18  CC: Groin Swelling  DX Cellulitis, scrotum; Hydrocele; Epididymal cyst  Pt is taking Rx as prescribed  Pt will call if needed  Informed of dr on call, office hours and phone number

## 2018-07-12 NOTE — ED NOTES
Client unable to secure ride home    Supervisor cleared for hospital to order [address] on the [address]     Meeta Hu RN  07/11/18 2052

## 2018-08-06 DIAGNOSIS — F41.9 ANXIETY: Primary | ICD-10-CM

## 2018-08-07 DIAGNOSIS — E78.5 HYPERLIPIDEMIA, UNSPECIFIED HYPERLIPIDEMIA TYPE: Primary | ICD-10-CM

## 2018-08-07 RX ORDER — ATORVASTATIN CALCIUM 10 MG/1
10 TABLET, FILM COATED ORAL DAILY
Qty: 30 TABLET | Refills: 3 | Status: SHIPPED | OUTPATIENT
Start: 2018-08-07 | End: 2018-10-04 | Stop reason: SDUPTHER

## 2018-08-07 RX ORDER — BUSPIRONE HYDROCHLORIDE 10 MG/1
10 TABLET ORAL 2 TIMES DAILY
Qty: 60 TABLET | Refills: 3 | Status: SHIPPED | OUTPATIENT
Start: 2018-08-07 | End: 2018-10-04 | Stop reason: SDUPTHER

## 2018-08-27 DIAGNOSIS — I10 ESSENTIAL HYPERTENSION: Primary | ICD-10-CM

## 2018-08-27 RX ORDER — LISINOPRIL AND HYDROCHLOROTHIAZIDE 25; 20 MG/1; MG/1
TABLET ORAL
Qty: 90 TABLET | Refills: 1 | Status: SHIPPED | OUTPATIENT
Start: 2018-08-27 | End: 2018-10-04 | Stop reason: SDUPTHER

## 2018-10-02 DIAGNOSIS — E78.49 OTHER HYPERLIPIDEMIA: ICD-10-CM

## 2018-10-02 RX ORDER — LOVASTATIN 20 MG/1
TABLET ORAL
Qty: 60 TABLET | Refills: 3 | OUTPATIENT
Start: 2018-10-02

## 2018-10-04 ENCOUNTER — OFFICE VISIT (OUTPATIENT)
Dept: FAMILY MEDICINE CLINIC | Facility: CLINIC | Age: 58
End: 2018-10-04
Payer: MEDICARE

## 2018-10-04 VITALS
BODY MASS INDEX: 23.24 KG/M2 | RESPIRATION RATE: 18 BRPM | OXYGEN SATURATION: 98 % | HEART RATE: 62 BPM | WEIGHT: 162 LBS | DIASTOLIC BLOOD PRESSURE: 62 MMHG | TEMPERATURE: 97.6 F | SYSTOLIC BLOOD PRESSURE: 132 MMHG

## 2018-10-04 DIAGNOSIS — F10.29 ALCOHOL DEPENDENCE WITH UNSPECIFIED ALCOHOL-INDUCED DISORDER (HCC): ICD-10-CM

## 2018-10-04 DIAGNOSIS — Z71.3 DIETARY COUNSELING: ICD-10-CM

## 2018-10-04 DIAGNOSIS — Z71.6 TOBACCO ABUSE COUNSELING: ICD-10-CM

## 2018-10-04 DIAGNOSIS — I10 ESSENTIAL HYPERTENSION: Primary | ICD-10-CM

## 2018-10-04 DIAGNOSIS — F32.A DEPRESSION, UNSPECIFIED DEPRESSION TYPE: ICD-10-CM

## 2018-10-04 DIAGNOSIS — Z23 NEED FOR INFLUENZA VACCINATION: ICD-10-CM

## 2018-10-04 DIAGNOSIS — E78.5 HYPERLIPIDEMIA, UNSPECIFIED HYPERLIPIDEMIA TYPE: ICD-10-CM

## 2018-10-04 DIAGNOSIS — F41.9 ANXIETY: ICD-10-CM

## 2018-10-04 PROCEDURE — 99213 OFFICE O/P EST LOW 20 MIN: CPT | Performed by: FAMILY MEDICINE

## 2018-10-04 PROCEDURE — G0008 ADMIN INFLUENZA VIRUS VAC: HCPCS | Performed by: FAMILY MEDICINE

## 2018-10-04 PROCEDURE — 90682 RIV4 VACC RECOMBINANT DNA IM: CPT | Performed by: FAMILY MEDICINE

## 2018-10-04 RX ORDER — BUSPIRONE HYDROCHLORIDE 10 MG/1
10 TABLET ORAL 2 TIMES DAILY
Qty: 60 TABLET | Refills: 0 | Status: SHIPPED | OUTPATIENT
Start: 2018-10-04 | End: 2018-11-05 | Stop reason: SDUPTHER

## 2018-10-04 RX ORDER — LISINOPRIL AND HYDROCHLOROTHIAZIDE 25; 20 MG/1; MG/1
1 TABLET ORAL DAILY
Qty: 30 TABLET | Refills: 0 | Status: SHIPPED | OUTPATIENT
Start: 2018-10-04 | End: 2018-11-05 | Stop reason: SDUPTHER

## 2018-10-04 RX ORDER — CITALOPRAM 20 MG/1
20 TABLET ORAL EVERY MORNING
Qty: 30 TABLET | Refills: 0 | Status: SHIPPED | OUTPATIENT
Start: 2018-10-04 | End: 2018-11-05 | Stop reason: SDUPTHER

## 2018-10-04 RX ORDER — ATORVASTATIN CALCIUM 10 MG/1
10 TABLET, FILM COATED ORAL DAILY
Qty: 30 TABLET | Refills: 0 | Status: SHIPPED | OUTPATIENT
Start: 2018-10-04 | End: 2018-11-05 | Stop reason: SDUPTHER

## 2018-10-04 NOTE — PROGRESS NOTES
Assessment/Plan:     Diagnoses and all orders for this visit:    Essential hypertension  BP controlled, c/w current med regimen will refill 30 days of meds, obtain blood work and follow up prior to 3month refills  Advised low sodium diet, regular exercise regimen, counseled on smoking cessation  -     lisinopril-hydrochlorothiazide (PRINZIDE,ZESTORETIC) 20-25 MG per tablet; Take 1 tablet by mouth daily for 30 days  -     metoprolol tartrate (LOPRESSOR) 25 mg tablet; Take 1 tablet (25 mg total) by mouth 2 (two) times a day  -     Comprehensive metabolic panel; Future    Hyperlipidemia, unspecified hyperlipidemia type  C/w statin, will check CMP and lipid panel  -     atorvastatin (LIPITOR) 10 mg tablet; Take 1 tablet (10 mg total) by mouth daily  -     Comprehensive metabolic panel; Future  -     Lipid panel; Future    Anxiety  Depression, unspecified depression type  Controlled per pt, does not want to see a therapist, doing well  Will refill meds  -     busPIRone (BUSPAR) 10 mg tablet; Take 1 tablet (10 mg total) by mouth 2 (two) times a day  -     citalopram (CeleXA) 20 mg tablet; Take 1 tablet (20 mg total) by mouth every morning    Tobacco abuse counseling  Counseled on importance of smoking cessation - offered further assistance, pt declines today  Dietary counseling,BMI 23 0-23 9, adult  Counseled on well balanced diet, advised adequate sources of fruits/veg, protein, calcium, water, and omega3 fatty acids  Advised low sodium diet in setting of HTN  Advised regular exercise regimen  Alcohol dependence with unspecified alcohol-induced disorder (HCC)  Chronic, pt drinks 8-10 beers a day, no desire to stop or cut back  I counseled on the importance of alcohol cessation, advised AA however pt does not want to stop  States he is aware of the risks and would prefer to continue with what his drinks      Need for influenza vaccination - given today  -     influenza vaccine, [id number], quadrivalent, recombinant, PF, 0 5 mL, for patients 18 yr+ (FLUBLOK)        Subjective:      Patient ID: Jhoan Arceo is a 60 y o  male  HPI    61 yo male at the office for follow up chronic conditions  HTN: compliant with medications as prescribed  Does not check BP at home  HLD: on atorvastin 10mg daily, compliant with med daily,   Anxiety/depression - controlled on buspar and celexa, does not see a therapist  Chronic alcohol consumption - pt states he drinks 8-10 beers/day, denies waking up and needing a beer  Starts drinking around 4-5pm each day through the night until he falls asleep at midnight  Does not believe he has a drinking problem, denies feeling guilty  He does not work - on disability  The following portions of the patient's history were reviewed and updated as appropriate: allergies, current medications, past family history, past medical history, past social history, past surgical history and problem list     Review of Systems   Constitutional: Negative for chills, fatigue and fever  Eyes: Negative for visual disturbance  Respiratory: Negative for cough and shortness of breath  Cardiovascular: Negative for chest pain and palpitations  Gastrointestinal: Negative for abdominal pain, blood in stool, constipation, diarrhea, nausea and vomiting  Musculoskeletal: Negative for arthralgias and myalgias  Skin: Negative for rash  Neurological: Negative for dizziness, light-headedness and headaches  Psychiatric/Behavioral: Negative for behavioral problems, decreased concentration and suicidal ideas  The patient is not nervous/anxious  Objective:      /62   Pulse 62   Temp 97 6 °F (36 4 °C)   Resp 18   Wt 73 5 kg (162 lb)   SpO2 98%   BMI 23 24 kg/m²          Physical Exam   Constitutional: He appears well-developed and well-nourished  No distress  HENT:   Head: Normocephalic and atraumatic  Neck: Normal range of motion  Neck supple  Cardiovascular: Normal rate and normal heart sounds  Pulmonary/Chest: Effort normal and breath sounds normal  He has no wheezes  Abdominal: Soft  Bowel sounds are normal  There is no tenderness  Musculoskeletal: He exhibits no edema  Skin: Skin is warm and dry  He is not diaphoretic  Nursing note and vitals reviewed

## 2018-11-05 ENCOUNTER — APPOINTMENT (OUTPATIENT)
Dept: LAB | Facility: HOSPITAL | Age: 58
End: 2018-11-05
Payer: MEDICARE

## 2018-11-05 ENCOUNTER — TRANSCRIBE ORDERS (OUTPATIENT)
Dept: ADMINISTRATIVE | Facility: HOSPITAL | Age: 58
End: 2018-11-05

## 2018-11-05 ENCOUNTER — OFFICE VISIT (OUTPATIENT)
Dept: FAMILY MEDICINE CLINIC | Facility: CLINIC | Age: 58
End: 2018-11-05
Payer: MEDICARE

## 2018-11-05 VITALS
BODY MASS INDEX: 23.68 KG/M2 | OXYGEN SATURATION: 97 % | WEIGHT: 165 LBS | TEMPERATURE: 97.5 F | DIASTOLIC BLOOD PRESSURE: 100 MMHG | HEART RATE: 64 BPM | RESPIRATION RATE: 20 BRPM | SYSTOLIC BLOOD PRESSURE: 160 MMHG

## 2018-11-05 DIAGNOSIS — F10.10 ALCOHOL ABUSE: ICD-10-CM

## 2018-11-05 DIAGNOSIS — E78.5 HYPERLIPIDEMIA, UNSPECIFIED HYPERLIPIDEMIA TYPE: ICD-10-CM

## 2018-11-05 DIAGNOSIS — Z72.0 TOBACCO ABUSE: ICD-10-CM

## 2018-11-05 DIAGNOSIS — Z13.1 SCREENING FOR DIABETES MELLITUS (DM): ICD-10-CM

## 2018-11-05 DIAGNOSIS — F32.A DEPRESSION, UNSPECIFIED DEPRESSION TYPE: ICD-10-CM

## 2018-11-05 DIAGNOSIS — I10 ESSENTIAL HYPERTENSION: ICD-10-CM

## 2018-11-05 DIAGNOSIS — I10 ESSENTIAL HYPERTENSION: Primary | ICD-10-CM

## 2018-11-05 DIAGNOSIS — F41.9 ANXIETY: ICD-10-CM

## 2018-11-05 LAB
ALBUMIN SERPL BCP-MCNC: 3.8 G/DL (ref 3.5–5)
ALP SERPL-CCNC: 70 U/L (ref 46–116)
ALT SERPL W P-5'-P-CCNC: 24 U/L (ref 12–78)
ANION GAP SERPL CALCULATED.3IONS-SCNC: 9 MMOL/L (ref 4–13)
AST SERPL W P-5'-P-CCNC: 25 U/L (ref 5–45)
BILIRUB SERPL-MCNC: 0.6 MG/DL (ref 0.2–1)
BUN SERPL-MCNC: 21 MG/DL (ref 5–25)
CALCIUM SERPL-MCNC: 9.4 MG/DL (ref 8.3–10.1)
CHLORIDE SERPL-SCNC: 98 MMOL/L (ref 100–108)
CHOLEST SERPL-MCNC: 142 MG/DL (ref 50–200)
CO2 SERPL-SCNC: 29 MMOL/L (ref 21–32)
CREAT SERPL-MCNC: 1.31 MG/DL (ref 0.6–1.3)
ERYTHROCYTE [DISTWIDTH] IN BLOOD BY AUTOMATED COUNT: 12.2 % (ref 11.6–15.1)
EST. AVERAGE GLUCOSE BLD GHB EST-MCNC: 100 MG/DL
FOLATE SERPL-MCNC: 16.3 NG/ML (ref 3.1–17.5)
GFR SERPL CREATININE-BSD FRML MDRD: 59 ML/MIN/1.73SQ M
GLUCOSE P FAST SERPL-MCNC: 98 MG/DL (ref 65–99)
HBA1C MFR BLD: 5.1 % (ref 4.2–6.3)
HCT VFR BLD AUTO: 45.4 % (ref 36.5–49.3)
HDLC SERPL-MCNC: 68 MG/DL (ref 40–60)
HGB BLD-MCNC: 15.9 G/DL (ref 12–17)
LDLC SERPL CALC-MCNC: 64 MG/DL (ref 0–100)
MCH RBC QN AUTO: 34 PG (ref 26.8–34.3)
MCHC RBC AUTO-ENTMCNC: 35 G/DL (ref 31.4–37.4)
MCV RBC AUTO: 97 FL (ref 82–98)
NONHDLC SERPL-MCNC: 74 MG/DL
PLATELET # BLD AUTO: 334 THOUSANDS/UL (ref 149–390)
PMV BLD AUTO: 8.7 FL (ref 8.9–12.7)
POTASSIUM SERPL-SCNC: 4.3 MMOL/L (ref 3.5–5.3)
PROT SERPL-MCNC: 8.4 G/DL (ref 6.4–8.2)
RBC # BLD AUTO: 4.68 MILLION/UL (ref 3.88–5.62)
SODIUM SERPL-SCNC: 136 MMOL/L (ref 136–145)
TRIGL SERPL-MCNC: 48 MG/DL
VIT B12 SERPL-MCNC: 520 PG/ML (ref 100–900)
WBC # BLD AUTO: 9.64 THOUSAND/UL (ref 4.31–10.16)

## 2018-11-05 PROCEDURE — 36415 COLL VENOUS BLD VENIPUNCTURE: CPT

## 2018-11-05 PROCEDURE — 82746 ASSAY OF FOLIC ACID SERUM: CPT

## 2018-11-05 PROCEDURE — 82607 VITAMIN B-12: CPT

## 2018-11-05 PROCEDURE — 80061 LIPID PANEL: CPT

## 2018-11-05 PROCEDURE — 99213 OFFICE O/P EST LOW 20 MIN: CPT | Performed by: FAMILY MEDICINE

## 2018-11-05 PROCEDURE — 86803 HEPATITIS C AB TEST: CPT

## 2018-11-05 PROCEDURE — 83036 HEMOGLOBIN GLYCOSYLATED A1C: CPT

## 2018-11-05 PROCEDURE — 80053 COMPREHEN METABOLIC PANEL: CPT

## 2018-11-05 PROCEDURE — 85027 COMPLETE CBC AUTOMATED: CPT

## 2018-11-05 RX ORDER — ATORVASTATIN CALCIUM 10 MG/1
10 TABLET, FILM COATED ORAL DAILY
Qty: 30 TABLET | Refills: 0 | Status: SHIPPED | OUTPATIENT
Start: 2018-11-05 | End: 2019-04-26 | Stop reason: SDUPTHER

## 2018-11-05 RX ORDER — LISINOPRIL AND HYDROCHLOROTHIAZIDE 25; 20 MG/1; MG/1
1 TABLET ORAL DAILY
Qty: 30 TABLET | Refills: 0 | Status: SHIPPED | OUTPATIENT
Start: 2018-11-05 | End: 2018-11-06

## 2018-11-05 RX ORDER — CITALOPRAM 20 MG/1
20 TABLET ORAL EVERY MORNING
Qty: 30 TABLET | Refills: 0 | Status: SHIPPED | OUTPATIENT
Start: 2018-11-05 | End: 2020-03-02 | Stop reason: SDUPTHER

## 2018-11-05 RX ORDER — BUSPIRONE HYDROCHLORIDE 10 MG/1
10 TABLET ORAL 2 TIMES DAILY
Qty: 60 TABLET | Refills: 0 | Status: SHIPPED | OUTPATIENT
Start: 2018-11-05 | End: 2019-05-02 | Stop reason: SDUPTHER

## 2018-11-05 NOTE — PROGRESS NOTES
Assessment/Plan:     Diagnoses and all orders for this visit:    Essential hypertension  Elevated BP noted - pt notes he is compliant with meds as prescribed  He states he has a BP monitor at home  Will record BP at home and return in 1-2 weeks for BP check  Will follow up labs and adjust meds as indicated  -     Comprehensive metabolic panel; Future  -     CBC and Platelet; Future  -     lisinopril-hydrochlorothiazide (PRINZIDE,ZESTORETIC) 20-25 MG per tablet; Take 1 tablet by mouth daily for 30 days  -     metoprolol tartrate (LOPRESSOR) 25 mg tablet; Take 1 tablet (25 mg total) by mouth 2 (two) times a day    Hyperlipidemia, unspecified hyperlipidemia type  -     Lipid panel; Future  -     atorvastatin (LIPITOR) 10 mg tablet; Take 1 tablet (10 mg total) by mouth daily    Tobacco abuse  Counseled on the importance of tobacco cessation  Pt does not desire to quit at this time, understands the risk  -     CBC and Platelet; Future    Screening for diabetes mellitus (DM)  -     HEMOGLOBIN A1C W/ EAG ESTIMATION; Future  -     CBC and Platelet; Future    Alcohol abuse  Counseled on the importance of alcohol cessation  Pt does not desire to quit at this time, understands the risks  -     Vitamin B12/Folate, Serum Panel; Future  -     Hepatitis C antibody; Future    Anxiety  Depression, unspecified depression type  -     busPIRone (BUSPAR) 10 mg tablet; Take 1 tablet (10 mg total) by mouth 2 (two) times a day  -     citalopram (CeleXA) 20 mg tablet; Take 1 tablet (20 mg total) by mouth every morning        Subjective:      Patient ID: Daja Kaiser is a 60 y o  male  HPI    61 yo male at office for follow up chronic conditions  HTN, HLD Tobacco abuse: pt notes he is compliant with medications as prescribed, he does not check his BP at home  He continues to smoke 15 cigarettes a day  He continues to drink alcohol daily  Depression/anxiety: stable, chronic   Compliant with meds as prescribed, does not see a therapist  Chronic alcohol consumption - states he is slowly trying to decrease the amt of beers he drinks a day since his last visit  Before he was drinking 8-10 a day, now states cut back to bout 6/day  The following portions of the patient's history were reviewed and updated as appropriate: allergies, current medications, past family history, past medical history, past social history, past surgical history and problem list     Review of Systems   Constitutional: Negative for chills, diaphoresis and fatigue  Eyes: Negative for visual disturbance  Respiratory: Negative for cough and shortness of breath  Cardiovascular: Negative for chest pain and palpitations  Gastrointestinal: Negative for abdominal pain, constipation, diarrhea, nausea and vomiting  Musculoskeletal: Negative for gait problem  Skin: Negative for rash  Neurological: Negative for dizziness and headaches  Objective:      /100 (BP Location: Left arm, Patient Position: Sitting, Cuff Size: Large)   Pulse 64   Temp 97 5 °F (36 4 °C) (Tympanic)   Resp 20   Wt 74 8 kg (165 lb)   SpO2 97%   BMI 23 68 kg/m²   160/78 on Recheck       Physical Exam   Constitutional: He is oriented to person, place, and time  He appears well-developed and well-nourished  No distress  Cardiovascular: Normal rate and regular rhythm  Pulmonary/Chest: Effort normal and breath sounds normal  No respiratory distress  Abdominal: Soft  Musculoskeletal: He exhibits no edema  Neurological: He is alert and oriented to person, place, and time  Skin: Skin is warm  He is not diaphoretic  Nursing note and vitals reviewed

## 2018-11-06 ENCOUNTER — TELEPHONE (OUTPATIENT)
Dept: FAMILY MEDICINE CLINIC | Facility: CLINIC | Age: 58
End: 2018-11-06

## 2018-11-06 DIAGNOSIS — I10 ESSENTIAL HYPERTENSION: Primary | ICD-10-CM

## 2018-11-06 LAB — HCV AB SER QL: NORMAL

## 2018-11-06 RX ORDER — AMLODIPINE BESYLATE 5 MG/1
5 TABLET ORAL DAILY
Qty: 30 TABLET | Refills: 0 | Status: SHIPPED | OUTPATIENT
Start: 2018-11-06 | End: 2018-11-16 | Stop reason: SDUPTHER

## 2018-11-06 NOTE — TELEPHONE ENCOUNTER
Returned pt's call, reviewed labs with him    Orders placed for new BP medicine amlodipine, he will d/c prinzide 2/2 elevated creatinine

## 2018-11-16 ENCOUNTER — OFFICE VISIT (OUTPATIENT)
Dept: FAMILY MEDICINE CLINIC | Facility: CLINIC | Age: 58
End: 2018-11-16
Payer: MEDICARE

## 2018-11-16 VITALS — SYSTOLIC BLOOD PRESSURE: 124 MMHG | DIASTOLIC BLOOD PRESSURE: 62 MMHG

## 2018-11-16 DIAGNOSIS — I10 ESSENTIAL HYPERTENSION: Primary | ICD-10-CM

## 2018-11-16 PROCEDURE — 99213 OFFICE O/P EST LOW 20 MIN: CPT | Performed by: FAMILY MEDICINE

## 2018-11-16 RX ORDER — AMLODIPINE BESYLATE 10 MG/1
10 TABLET ORAL DAILY
Qty: 30 TABLET | Refills: 2 | Status: SHIPPED | OUTPATIENT
Start: 2018-11-16 | End: 2019-02-24 | Stop reason: SDUPTHER

## 2018-11-16 NOTE — PROGRESS NOTES
Assessment/Plan:       Diagnoses and all orders for this visit:    Essential hypertension  BP uncontrolled - 152/96 on my recheck  At this time, will continue with metoprolol 25mg BID, will increase amlodipine from 5mg daily to 10mg daily  Will have pt return in about 3 weeks for BP check  Pt was previously on lisinopril-hctz 20-25mg daily however was discontinued about 2 weeks ago 2/2 elevated creatinine  Will continue to follow, counseled on low sodium diet, smoking cessation, and exercise regimen  Pt will make a BP log and bring it at next visit  - will adjust antihypertensive med regimen as indicated  All questions answered to pt's satisfaction, pt agrees with treatment plan  -     amLODIPine (NORVASC) 10 mg tablet; Take 1 tablet (10 mg total) by mouth daily        Subjective:      Patient ID: Yaquelin Melendrez is a 60 y o  male  HPI    61 yo male at office for HTN follow up  He states he is compliant with medications as prescribed - takes metoprolol 25mg BID and norvasc 5mg daily  States he eats a well balanced diet, he does not add salt to foods, avoids foods in high sodium  He does not exercise  Continues to smoke daily - making efforts to quit on his own  He has been checking his BP at home daily: systolic ranges 974H-802W, diastolic 53-37O  The following portions of the patient's history were reviewed and updated as appropriate: allergies, current medications, past family history, past medical history, past social history, past surgical history and problem list     Review of Systems   Constitutional: Negative for chills, fatigue and fever  Respiratory: Negative for choking and shortness of breath  Cardiovascular: Negative for chest pain and palpitations  Gastrointestinal: Negative for abdominal pain  Skin: Negative for rash  Neurological: Negative for dizziness, light-headedness and headaches           Objective:      /62 (BP Location: Left arm, Patient Position: Sitting, Cuff Size: Standard)     Recheck BP: 152/96     Physical Exam   Constitutional: He is oriented to person, place, and time  He appears well-developed and well-nourished  No distress  Cardiovascular: Normal rate and regular rhythm  Pulmonary/Chest: Effort normal and breath sounds normal    Abdominal: Soft  Neurological: He is alert and oriented to person, place, and time  Skin: No rash noted  He is not diaphoretic  Psychiatric: He has a normal mood and affect  Nursing note and vitals reviewed

## 2018-11-26 ENCOUNTER — TELEPHONE (OUTPATIENT)
Dept: FAMILY MEDICINE CLINIC | Facility: CLINIC | Age: 58
End: 2018-11-26

## 2018-11-28 ENCOUNTER — TELEPHONE (OUTPATIENT)
Dept: FAMILY MEDICINE CLINIC | Facility: CLINIC | Age: 58
End: 2018-11-28

## 2018-11-28 NOTE — TELEPHONE ENCOUNTER
PT CALLED TO TALK ABOUT LEAVING HIS BODY TO [employer]  I ASKED IF HE WANTED TO DONATE IT AND HE SAID HE THOUGHT HE COULD GET A FEW BUCKS  I THINK HE WANTS TO SEE IF YOU CAN GET HIM SOME INFO

## 2019-02-24 DIAGNOSIS — I10 ESSENTIAL HYPERTENSION: ICD-10-CM

## 2019-02-25 RX ORDER — AMLODIPINE BESYLATE 10 MG/1
TABLET ORAL
Qty: 30 TABLET | Refills: 2 | Status: SHIPPED | OUTPATIENT
Start: 2019-02-25 | End: 2019-06-04 | Stop reason: SDUPTHER

## 2019-04-26 DIAGNOSIS — E78.5 HYPERLIPIDEMIA, UNSPECIFIED HYPERLIPIDEMIA TYPE: ICD-10-CM

## 2019-04-26 DIAGNOSIS — I10 HYPERTENSION, UNSPECIFIED TYPE: ICD-10-CM

## 2019-04-26 DIAGNOSIS — F41.9 ANXIETY: ICD-10-CM

## 2019-04-26 RX ORDER — BUSPIRONE HYDROCHLORIDE 10 MG/1
TABLET ORAL
Qty: 180 TABLET | Refills: 1 | OUTPATIENT
Start: 2019-04-26

## 2019-04-29 RX ORDER — ATORVASTATIN CALCIUM 10 MG/1
TABLET, FILM COATED ORAL
Qty: 90 TABLET | Refills: 2 | Status: SHIPPED | OUTPATIENT
Start: 2019-04-29 | End: 2020-03-04 | Stop reason: SDDI

## 2019-05-01 DIAGNOSIS — F41.9 ANXIETY: ICD-10-CM

## 2019-05-01 DIAGNOSIS — I10 HYPERTENSION, UNSPECIFIED TYPE: ICD-10-CM

## 2019-05-02 DIAGNOSIS — F41.9 ANXIETY: ICD-10-CM

## 2019-05-02 RX ORDER — BUSPIRONE HYDROCHLORIDE 10 MG/1
10 TABLET ORAL 2 TIMES DAILY
Qty: 180 TABLET | Refills: 1 | Status: SHIPPED | OUTPATIENT
Start: 2019-05-02 | End: 2020-03-04 | Stop reason: SDDI

## 2019-05-04 RX ORDER — BUSPIRONE HYDROCHLORIDE 10 MG/1
TABLET ORAL
Qty: 180 TABLET | Refills: 1 | OUTPATIENT
Start: 2019-05-04

## 2019-06-04 DIAGNOSIS — F32.A DEPRESSION, UNSPECIFIED DEPRESSION TYPE: ICD-10-CM

## 2019-06-04 DIAGNOSIS — I10 ESSENTIAL HYPERTENSION: ICD-10-CM

## 2019-06-04 DIAGNOSIS — I10 HYPERTENSION, UNSPECIFIED TYPE: ICD-10-CM

## 2019-06-07 RX ORDER — CITALOPRAM 20 MG/1
20 TABLET ORAL EVERY MORNING
Qty: 30 TABLET | Refills: 0 | OUTPATIENT
Start: 2019-06-07

## 2019-06-07 RX ORDER — AMLODIPINE BESYLATE 10 MG/1
TABLET ORAL
Qty: 30 TABLET | Refills: 2 | Status: SHIPPED | OUTPATIENT
Start: 2019-06-07 | End: 2019-06-11 | Stop reason: SDUPTHER

## 2019-06-11 ENCOUNTER — APPOINTMENT (OUTPATIENT)
Dept: LAB | Facility: CLINIC | Age: 59
End: 2019-06-11
Payer: MEDICARE

## 2019-06-11 ENCOUNTER — OFFICE VISIT (OUTPATIENT)
Dept: FAMILY MEDICINE CLINIC | Facility: CLINIC | Age: 59
End: 2019-06-11
Payer: MEDICARE

## 2019-06-11 VITALS
RESPIRATION RATE: 16 BRPM | HEIGHT: 68 IN | WEIGHT: 167 LBS | TEMPERATURE: 97.6 F | BODY MASS INDEX: 25.31 KG/M2 | OXYGEN SATURATION: 96 % | DIASTOLIC BLOOD PRESSURE: 90 MMHG | SYSTOLIC BLOOD PRESSURE: 170 MMHG | HEART RATE: 68 BPM

## 2019-06-11 DIAGNOSIS — F10.29 ALCOHOL DEPENDENCE WITH UNSPECIFIED ALCOHOL-INDUCED DISORDER (HCC): ICD-10-CM

## 2019-06-11 DIAGNOSIS — Z72.0 TOBACCO ABUSE: ICD-10-CM

## 2019-06-11 DIAGNOSIS — E78.5 HYPERLIPIDEMIA, UNSPECIFIED HYPERLIPIDEMIA TYPE: ICD-10-CM

## 2019-06-11 DIAGNOSIS — F10.20 ALCOHOLISM (HCC): ICD-10-CM

## 2019-06-11 DIAGNOSIS — I10 ESSENTIAL HYPERTENSION: Primary | ICD-10-CM

## 2019-06-11 DIAGNOSIS — F41.9 ANXIETY DISORDER, UNSPECIFIED TYPE: ICD-10-CM

## 2019-06-11 DIAGNOSIS — I10 ESSENTIAL HYPERTENSION: ICD-10-CM

## 2019-06-11 LAB
ALBUMIN SERPL BCP-MCNC: 3.6 G/DL (ref 3.5–5)
ALP SERPL-CCNC: 72 U/L (ref 46–116)
ALT SERPL W P-5'-P-CCNC: 31 U/L (ref 12–78)
ANION GAP SERPL CALCULATED.3IONS-SCNC: 2 MMOL/L (ref 4–13)
AST SERPL W P-5'-P-CCNC: 30 U/L (ref 5–45)
BASOPHILS # BLD AUTO: 0.17 THOUSANDS/ΜL (ref 0–0.1)
BASOPHILS NFR BLD AUTO: 2 % (ref 0–1)
BILIRUB SERPL-MCNC: 0.77 MG/DL (ref 0.2–1)
BUN SERPL-MCNC: 11 MG/DL (ref 5–25)
CALCIUM SERPL-MCNC: 9 MG/DL (ref 8.3–10.1)
CHLORIDE SERPL-SCNC: 99 MMOL/L (ref 100–108)
CHOLEST SERPL-MCNC: 131 MG/DL (ref 50–200)
CO2 SERPL-SCNC: 32 MMOL/L (ref 21–32)
CREAT SERPL-MCNC: 1.09 MG/DL (ref 0.6–1.3)
EOSINOPHIL # BLD AUTO: 0.53 THOUSAND/ΜL (ref 0–0.61)
EOSINOPHIL NFR BLD AUTO: 6 % (ref 0–6)
ERYTHROCYTE [DISTWIDTH] IN BLOOD BY AUTOMATED COUNT: 13 % (ref 11.6–15.1)
GFR SERPL CREATININE-BSD FRML MDRD: 74 ML/MIN/1.73SQ M
GLUCOSE P FAST SERPL-MCNC: 89 MG/DL (ref 65–99)
HCT VFR BLD AUTO: 50.6 % (ref 36.5–49.3)
HDLC SERPL-MCNC: 63 MG/DL (ref 40–60)
HGB BLD-MCNC: 17.2 G/DL (ref 12–17)
IMM GRANULOCYTES # BLD AUTO: 0.03 THOUSAND/UL (ref 0–0.2)
IMM GRANULOCYTES NFR BLD AUTO: 0 % (ref 0–2)
LDLC SERPL CALC-MCNC: 57 MG/DL (ref 0–100)
LYMPHOCYTES # BLD AUTO: 3.09 THOUSANDS/ΜL (ref 0.6–4.47)
LYMPHOCYTES NFR BLD AUTO: 35 % (ref 14–44)
MCH RBC QN AUTO: 33 PG (ref 26.8–34.3)
MCHC RBC AUTO-ENTMCNC: 34 G/DL (ref 31.4–37.4)
MCV RBC AUTO: 97 FL (ref 82–98)
MONOCYTES # BLD AUTO: 0.81 THOUSAND/ΜL (ref 0.17–1.22)
MONOCYTES NFR BLD AUTO: 9 % (ref 4–12)
NEUTROPHILS # BLD AUTO: 4.22 THOUSANDS/ΜL (ref 1.85–7.62)
NEUTS SEG NFR BLD AUTO: 48 % (ref 43–75)
NONHDLC SERPL-MCNC: 68 MG/DL
NRBC BLD AUTO-RTO: 0 /100 WBCS
PLATELET # BLD AUTO: 296 THOUSANDS/UL (ref 149–390)
PMV BLD AUTO: 10 FL (ref 8.9–12.7)
POTASSIUM SERPL-SCNC: 4.1 MMOL/L (ref 3.5–5.3)
PROT SERPL-MCNC: 8.1 G/DL (ref 6.4–8.2)
RBC # BLD AUTO: 5.21 MILLION/UL (ref 3.88–5.62)
SODIUM SERPL-SCNC: 133 MMOL/L (ref 136–145)
TRIGL SERPL-MCNC: 54 MG/DL
WBC # BLD AUTO: 8.85 THOUSAND/UL (ref 4.31–10.16)

## 2019-06-11 PROCEDURE — 99214 OFFICE O/P EST MOD 30 MIN: CPT | Performed by: NURSE PRACTITIONER

## 2019-06-11 PROCEDURE — 80053 COMPREHEN METABOLIC PANEL: CPT

## 2019-06-11 PROCEDURE — 85025 COMPLETE CBC W/AUTO DIFF WBC: CPT

## 2019-06-11 PROCEDURE — 80061 LIPID PANEL: CPT

## 2019-06-11 PROCEDURE — 36415 COLL VENOUS BLD VENIPUNCTURE: CPT

## 2019-06-11 RX ORDER — AMLODIPINE BESYLATE 10 MG/1
10 TABLET ORAL DAILY
Qty: 90 TABLET | Refills: 1 | Status: SHIPPED | OUTPATIENT
Start: 2019-06-11 | End: 2020-03-04 | Stop reason: ALTCHOICE

## 2019-07-31 ENCOUNTER — TELEPHONE (OUTPATIENT)
Dept: FAMILY MEDICINE CLINIC | Facility: CLINIC | Age: 59
End: 2019-07-31

## 2019-09-04 DIAGNOSIS — F32.A DEPRESSION, UNSPECIFIED DEPRESSION TYPE: ICD-10-CM

## 2019-09-05 RX ORDER — CITALOPRAM 20 MG/1
TABLET ORAL
Qty: 90 TABLET | Refills: 1 | Status: SHIPPED | OUTPATIENT
Start: 2019-09-05 | End: 2019-10-25 | Stop reason: ALTCHOICE

## 2019-10-25 ENCOUNTER — OFFICE VISIT (OUTPATIENT)
Dept: FAMILY MEDICINE CLINIC | Facility: CLINIC | Age: 59
End: 2019-10-25
Payer: MEDICARE

## 2019-10-25 VITALS
HEART RATE: 73 BPM | HEIGHT: 68 IN | SYSTOLIC BLOOD PRESSURE: 130 MMHG | BODY MASS INDEX: 25.46 KG/M2 | RESPIRATION RATE: 12 BRPM | WEIGHT: 168 LBS | TEMPERATURE: 97.8 F | OXYGEN SATURATION: 97 % | DIASTOLIC BLOOD PRESSURE: 70 MMHG

## 2019-10-25 DIAGNOSIS — Z23 NEED FOR IMMUNIZATION AGAINST INFLUENZA: ICD-10-CM

## 2019-10-25 DIAGNOSIS — Z00.00 MEDICARE ANNUAL WELLNESS VISIT, SUBSEQUENT: Primary | ICD-10-CM

## 2019-10-25 PROCEDURE — G0439 PPPS, SUBSEQ VISIT: HCPCS | Performed by: NURSE PRACTITIONER

## 2019-10-25 PROCEDURE — 90471 IMMUNIZATION ADMIN: CPT

## 2019-10-25 PROCEDURE — 90682 RIV4 VACC RECOMBINANT DNA IM: CPT
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
## 2019-10-25 NOTE — PATIENT INSTRUCTIONS
Medicare Preventive Visit Patient Instructions  Thank you for completing your Welcome to Medicare Visit or Medicare Annual Wellness Visit today  Your next wellness visit will be due in one year (10/25/2020)  The screening/preventive services that you may require over the next 5-10 years are detailed below  Some tests may not apply to you based off risk factors and/or age  Screening tests ordered at today's visit but not completed yet may show as past due  Also, please note that scanned in results may not display below  Preventive Screenings:  Service Recommendations Previous Testing/Comments   Colorectal Cancer Screening  · Colonoscopy    · Fecal Occult Blood Test (FOBT)/Fecal Immunochemical Test (FIT)  · Fecal DNA/Cologuard Test  · Flexible Sigmoidoscopy Age: 54-65 years old   Colonoscopy: every 10 years (May be performed more frequently if at higher risk)  OR  FOBT/FIT: every 1 year  OR  Cologuard: every 3 years  OR  Sigmoidoscopy: every 5 years  Screening may be recommended earlier than age 48 if at higher risk for colorectal cancer  Also, an individualized decision between you and your healthcare provider will decide whether screening between the ages of 74-80 would be appropriate   Colonoscopy: Not on file  FOBT/FIT: Not on file  Cologuard: Not on file  Sigmoidoscopy: Not on file         Prostate Cancer Screening Individualized decision between patient and health care provider in men between ages of 53-78   Medicare will cover every 12 months beginning on the day after your 50th birthday PSA: No results in last 5 years          Hepatitis C Screening Once for adults born between 1945 and 1965  More frequently in patients at high risk for Hepatitis C Hep C Antibody: 11/05/2018    Screening Current   Diabetes Screening 1-2 times per year if you're at risk for diabetes or have pre-diabetes Fasting glucose: 89 mg/dL   A1C: 5 1 %    Screening Current   Cholesterol Screening Once every 5 years if you don't have a lipid disorder  May order more often based on risk factors  Lipid panel: 06/11/2019    Screening Not Indicated  History Lipid Disorder      Other Preventive Screenings Covered by Medicare:  1  Abdominal Aortic Aneurysm (AAA) Screening: covered once if your at risk  You're considered to be at risk if you have a family history of AAA or a male between the age of 73-68 who smoking at least 100 cigarettes in your lifetime  2  Lung Cancer Screening: covers low dose CT scan once per year if you meet all of the following conditions: (1) Age 50-69; (2) No signs or symptoms of lung cancer; (3) Current smoker or have quit smoking within the last 15 years; (4) You have a tobacco smoking history of at least 30 pack years (packs per day x number of years you smoked); (5) You get a written order from a healthcare provider  3  Glaucoma Screening: covered annually if you're considered high risk: (1) You have diabetes OR (2) Family history of glaucoma OR (3)  aged 48 and older OR (3)  American aged 72 and older  3  Osteoporosis Screening: covered every 2 years if you meet one of the following conditions: (1) Have a vertebral abnormality; (2) On glucocorticoid therapy for more than 3 months; (3) Have primary hyperparathyroidism; (4) On osteoporosis medications and need to assess response to drug therapy  5  HIV Screening: covered annually if you're between the age of 12-76  Also covered annually if you are younger than 13 and older than 72 with risk factors for HIV infection  For pregnant patients, it is covered up to 3 times per pregnancy      Immunizations:  Immunization Recommendations   Influenza Vaccine Annual influenza vaccination during flu season is recommended for all persons aged >= 6 months who do not have contraindications   Pneumococcal Vaccine (Prevnar and Pneumovax)  * Prevnar = PCV13  * Pneumovax = PPSV23 Adults 25-60 years old: 1-3 doses may be recommended based on certain risk factors  Adults 72 years old: Prevnar (PCV13) vaccine recommended followed by Pneumovax (PPSV23) vaccine  If already received PPSV23 since turning 65, then PCV13 recommended at least one year after PPSV23 dose  Hepatitis B Vaccine 3 dose series if at intermediate or high risk (ex: diabetes, end stage renal disease, liver disease)   Tetanus (Td) Vaccine - COST NOT COVERED BY MEDICARE PART B Following completion of primary series, a booster dose should be given every 10 years to maintain immunity against tetanus  Td may also be given as tetanus wound prophylaxis  Tdap Vaccine - COST NOT COVERED BY MEDICARE PART B Recommended at least once for all adults  For pregnant patients, recommended with each pregnancy  Shingles Vaccine (Shingrix) - COST NOT COVERED BY MEDICARE PART B  2 shot series recommended in those aged 48 and above     Health Maintenance Due:      Topic Date Due    CRC Screening: Colonoscopy  1960    Hepatitis C Screening  Completed     Immunizations Due:      Topic Date Due    INFLUENZA VACCINE  07/01/2019     Advance Directives   What are advance directives? Advance directives are legal documents that state your wishes and plans for medical care  These plans are made ahead of time in case you lose your ability to make decisions for yourself  Advance directives can apply to any medical decision, such as the treatments you want, and if you want to donate organs  What are the types of advance directives? There are many types of advance directives, and each state has rules about how to use them  You may choose a combination of any of the following:  · Living will: This is a written record of the treatment you want  You can also choose which treatments you do not want, which to limit, and which to stop at a certain time  This includes surgery, medicine, IV fluid, and tube feedings  · Durable power of  for healthcare [place] [facility]):   This is a written record that states who you want to make healthcare choices for you when you are unable to make them for yourself  This person, called a proxy, is usually a family member or a friend  You may choose more than 1 proxy  · Do not resuscitate (DNR) order:  A DNR order is used in case your heart stops beating or you stop breathing  It is a request not to have certain forms of treatment, such as CPR  A DNR order may be included in other types of advance directives  · Medical directive: This covers the care that you want if you are in a coma, near death, or unable to make decisions for yourself  You can list the treatments you want for each condition  Treatment may include pain medicine, surgery, blood transfusions, dialysis, IV or tube feedings, and a ventilator (breathing machine)  · Values history: This document has questions about your views, beliefs, and how you feel and think about life  This information can help others choose the care that you would choose  Why are advance directives important? An advance directive helps you control your care  Although spoken wishes may be used, it is better to have your wishes written down  Spoken wishes can be misunderstood, or not followed  Treatments may be given even if you do not want them  An advance directive may make it easier for your family to make difficult choices about your care  Cigarette Smoking and Your Health   Risks to your health if you smoke:  Nicotine and other chemicals found in tobacco damage every cell in your body  Even if you are a light smoker, you have an increased risk for cancer, heart disease, and lung disease  If you are pregnant or have diabetes, smoking increases your risk for complications  Benefits to your health if you stop smoking:   · You decrease respiratory symptoms such as coughing, wheezing, and shortness of breath  · You reduce your risk for cancers of the lung, mouth, throat, kidney, bladder, pancreas, stomach, and cervix   If you already have cancer, you increase the benefits of chemotherapy  You also reduce your risk for cancer returning or a second cancer from developing  · You reduce your risk for heart disease, blood clots, heart attack, and stroke  · You reduce your risk for lung infections, and diseases such as pneumonia, asthma, chronic bronchitis, and emphysema  · Your circulation improves  More oxygen can be delivered to your body  If you have diabetes, you lower your risk for complications, such as kidney, artery, and eye diseases  You also lower your risk for nerve damage  Nerve damage can lead to amputations, poor vision, and blindness  · You improve your body's ability to heal and to fight infections  For more information and support to stop smoking:   · [employer]  Phone: 0- 796 - [phone]  Web Address: ISpeak  Weight Management   Why it is important to manage your weight:  Being overweight increases your risk of health conditions such as heart disease, high blood pressure, type 2 diabetes, and certain types of cancer  It can also increase your risk for osteoarthritis, sleep apnea, and other respiratory problems  Aim for a slow, steady weight loss  Even a small amount of weight loss can lower your risk of health problems  How to lose weight safely:  A safe and healthy way to lose weight is to eat fewer calories and get regular exercise  You can lose up about 1 pound a week by decreasing the number of calories you eat by 500 calories each day  Healthy meal plan for weight management:  A healthy meal plan includes a variety of foods, contains fewer calories, and helps you stay healthy  A healthy meal plan includes the following:  · Eat whole-grain foods more often  A healthy meal plan should contain fiber  Fiber is the part of grains, fruits, and vegetables that is not broken down by your body  Whole-grain foods are healthy and provide extra fiber in your diet   Some examples of whole-grain foods are whole-wheat breads and pastas, oatmeal, brown rice, and bulgur  · Eat a variety of vegetables every day  Include dark, leafy greens such as spinach, kale, tom greens, and mustard greens  Eat yellow and orange vegetables such as carrots, sweet potatoes, and winter squash  · Eat a variety of fruits every day  Choose fresh or canned fruit (canned in its own juice or light syrup) instead of juice  Fruit juice has very little or no fiber  · Eat low-fat dairy foods  Drink fat-free (skim) milk or 1% milk  Eat fat-free yogurt and low-fat cottage cheese  Try low-fat cheeses such as mozzarella and other reduced-fat cheeses  · Choose meat and other protein foods that are low in fat  Choose beans or other legumes such as split peas or lentils  Choose fish, skinless poultry (chicken or turkey), or lean cuts of red meat (beef or pork)  Before you cook meat or poultry, cut off any visible fat  · Use less fat and oil  Try baking foods instead of frying them  Add less fat, such as margarine, sour cream, regular salad dressing and mayonnaise to foods  Eat fewer high-fat foods  Some examples of high-fat foods include french fries, doughnuts, ice cream, and cakes  · Eat fewer sweets  Limit foods and drinks that are high in sugar  This includes candy, cookies, regular soda, and sweetened drinks  Exercise:  Exercise at least 30 minutes per day on most days of the week  Some examples of exercise include walking, biking, dancing, and swimming  You can also fit in more physical activity by taking the stairs instead of the elevator or parking farther away from stores  Ask your healthcare provider about the best exercise plan for you  © Copyright [employer] 2018 Information is for End User's use only and may not be sold, redistributed or otherwise used for commercial purposes   All illustrations and images included in CareNotes® are the copyrighted property of A D A M , Inc  or [address] [place] [address]

## 2019-12-19 DIAGNOSIS — I10 ESSENTIAL HYPERTENSION: ICD-10-CM

## 2019-12-19 RX ORDER — LISINOPRIL AND HYDROCHLOROTHIAZIDE 25; 20 MG/1; MG/1
TABLET ORAL
Qty: 90 TABLET | Refills: 0 | Status: SHIPPED | OUTPATIENT
Start: 2019-12-19 | End: 2020-03-25 | Stop reason: SDUPTHER

## 2020-03-02 DIAGNOSIS — F32.A DEPRESSION, UNSPECIFIED DEPRESSION TYPE: ICD-10-CM

## 2020-03-03 RX ORDER — CITALOPRAM 20 MG/1
20 TABLET ORAL EVERY MORNING
Qty: 90 TABLET | Refills: 1 | Status: SHIPPED | OUTPATIENT
Start: 2020-03-03 | End: 2020-03-25 | Stop reason: ALTCHOICE

## 2020-03-04 ENCOUNTER — APPOINTMENT (OUTPATIENT)
Dept: RADIOLOGY | Facility: CLINIC | Age: 60
End: 2020-03-04
Payer: MEDICARE

## 2020-03-04 ENCOUNTER — APPOINTMENT (OUTPATIENT)
Dept: LAB | Facility: CLINIC | Age: 60
End: 2020-03-04
Payer: MEDICARE

## 2020-03-04 ENCOUNTER — OFFICE VISIT (OUTPATIENT)
Dept: FAMILY MEDICINE CLINIC | Facility: CLINIC | Age: 60
End: 2020-03-04
Payer: MEDICARE

## 2020-03-04 VITALS
DIASTOLIC BLOOD PRESSURE: 84 MMHG | WEIGHT: 167 LBS | HEIGHT: 68 IN | TEMPERATURE: 97 F | OXYGEN SATURATION: 96 % | RESPIRATION RATE: 20 BRPM | SYSTOLIC BLOOD PRESSURE: 124 MMHG | HEART RATE: 88 BPM | BODY MASS INDEX: 25.31 KG/M2

## 2020-03-04 DIAGNOSIS — E78.2 MIXED HYPERLIPIDEMIA: ICD-10-CM

## 2020-03-04 DIAGNOSIS — I10 ESSENTIAL HYPERTENSION: ICD-10-CM

## 2020-03-04 DIAGNOSIS — Z72.0 TOBACCO ABUSE: ICD-10-CM

## 2020-03-04 DIAGNOSIS — R05.9 COUGH: ICD-10-CM

## 2020-03-04 DIAGNOSIS — Z11.4 SCREENING FOR HIV (HUMAN IMMUNODEFICIENCY VIRUS): ICD-10-CM

## 2020-03-04 DIAGNOSIS — Z12.11 SCREENING FOR MALIGNANT NEOPLASM OF COLON: ICD-10-CM

## 2020-03-04 DIAGNOSIS — R05.9 COUGH: Primary | ICD-10-CM

## 2020-03-04 DIAGNOSIS — F10.20 ALCOHOLISM (HCC): ICD-10-CM

## 2020-03-04 PROBLEM — R53.83 FATIGUE: Status: RESOLVED | Noted: 2018-04-03 | Resolved: 2020-03-04

## 2020-03-04 PROBLEM — R59.9 ENLARGED LYMPH NODES: Status: RESOLVED | Noted: 2017-06-06 | Resolved: 2020-03-04

## 2020-03-04 LAB
ALBUMIN SERPL BCP-MCNC: 3.8 G/DL (ref 3.5–5)
ALP SERPL-CCNC: 79 U/L (ref 46–116)
ALT SERPL W P-5'-P-CCNC: 34 U/L (ref 12–78)
ANION GAP SERPL CALCULATED.3IONS-SCNC: 4 MMOL/L (ref 4–13)
AST SERPL W P-5'-P-CCNC: 21 U/L (ref 5–45)
BASOPHILS # BLD AUTO: 0.1 THOUSANDS/ΜL (ref 0–0.1)
BASOPHILS NFR BLD AUTO: 2 % (ref 0–1)
BILIRUB SERPL-MCNC: 0.63 MG/DL (ref 0.2–1)
BUN SERPL-MCNC: 14 MG/DL (ref 5–25)
CALCIUM SERPL-MCNC: 9.2 MG/DL (ref 8.3–10.1)
CHLORIDE SERPL-SCNC: 94 MMOL/L (ref 100–108)
CHOLEST SERPL-MCNC: 165 MG/DL (ref 50–200)
CO2 SERPL-SCNC: 30 MMOL/L (ref 21–32)
CREAT SERPL-MCNC: 1.2 MG/DL (ref 0.6–1.3)
EOSINOPHIL # BLD AUTO: 0.27 THOUSAND/ΜL (ref 0–0.61)
EOSINOPHIL NFR BLD AUTO: 4 % (ref 0–6)
ERYTHROCYTE [DISTWIDTH] IN BLOOD BY AUTOMATED COUNT: 12.8 % (ref 11.6–15.1)
GFR SERPL CREATININE-BSD FRML MDRD: 65 ML/MIN/1.73SQ M
GLUCOSE P FAST SERPL-MCNC: 105 MG/DL (ref 65–99)
HCT VFR BLD AUTO: 49.6 % (ref 36.5–49.3)
HDLC SERPL-MCNC: 59 MG/DL
HGB BLD-MCNC: 17.3 G/DL (ref 12–17)
IMM GRANULOCYTES # BLD AUTO: 0.02 THOUSAND/UL (ref 0–0.2)
IMM GRANULOCYTES NFR BLD AUTO: 0 % (ref 0–2)
LDLC SERPL CALC-MCNC: 94 MG/DL (ref 0–100)
LYMPHOCYTES # BLD AUTO: 2.36 THOUSANDS/ΜL (ref 0.6–4.47)
LYMPHOCYTES NFR BLD AUTO: 36 % (ref 14–44)
MCH RBC QN AUTO: 33.3 PG (ref 26.8–34.3)
MCHC RBC AUTO-ENTMCNC: 34.9 G/DL (ref 31.4–37.4)
MCV RBC AUTO: 96 FL (ref 82–98)
MONOCYTES # BLD AUTO: 0.58 THOUSAND/ΜL (ref 0.17–1.22)
MONOCYTES NFR BLD AUTO: 9 % (ref 4–12)
NEUTROPHILS # BLD AUTO: 3.29 THOUSANDS/ΜL (ref 1.85–7.62)
NEUTS SEG NFR BLD AUTO: 49 % (ref 43–75)
NONHDLC SERPL-MCNC: 106 MG/DL
NRBC BLD AUTO-RTO: 0 /100 WBCS
PLATELET # BLD AUTO: 316 THOUSANDS/UL (ref 149–390)
PMV BLD AUTO: 9.3 FL (ref 8.9–12.7)
POTASSIUM SERPL-SCNC: 4.1 MMOL/L (ref 3.5–5.3)
PROT SERPL-MCNC: 8.4 G/DL (ref 6.4–8.2)
RBC # BLD AUTO: 5.19 MILLION/UL (ref 3.88–5.62)
SODIUM SERPL-SCNC: 128 MMOL/L (ref 136–145)
TRIGL SERPL-MCNC: 62 MG/DL
WBC # BLD AUTO: 6.62 THOUSAND/UL (ref 4.31–10.16)

## 2020-03-04 PROCEDURE — 3079F DIAST BP 80-89 MM HG: CPT | Performed by: NURSE PRACTITIONER

## 2020-03-04 PROCEDURE — 3008F BODY MASS INDEX DOCD: CPT | Performed by: NURSE PRACTITIONER

## 2020-03-04 PROCEDURE — 4004F PT TOBACCO SCREEN RCVD TLK: CPT | Performed by: NURSE PRACTITIONER

## 2020-03-04 PROCEDURE — 80053 COMPREHEN METABOLIC PANEL: CPT

## 2020-03-04 PROCEDURE — 99214 OFFICE O/P EST MOD 30 MIN: CPT | Performed by: NURSE PRACTITIONER

## 2020-03-04 PROCEDURE — 80061 LIPID PANEL: CPT

## 2020-03-04 PROCEDURE — 87389 HIV-1 AG W/HIV-1&-2 AB AG IA: CPT

## 2020-03-04 PROCEDURE — 36415 COLL VENOUS BLD VENIPUNCTURE: CPT

## 2020-03-04 PROCEDURE — 3074F SYST BP LT 130 MM HG: CPT | Performed by: NURSE PRACTITIONER

## 2020-03-04 PROCEDURE — 71046 X-RAY EXAM CHEST 2 VIEWS: CPT

## 2020-03-04 PROCEDURE — 85025 COMPLETE CBC W/AUTO DIFF WBC: CPT

## 2020-03-04 RX ORDER — BENZONATATE 200 MG/1
200 CAPSULE ORAL 3 TIMES DAILY PRN
Qty: 20 CAPSULE | Refills: 0 | Status: SHIPPED | OUTPATIENT
Start: 2020-03-04 | End: 2020-03-25 | Stop reason: ALTCHOICE

## 2020-03-04 NOTE — PATIENT INSTRUCTIONS
Chest xray today as ordered  Tessalon perles as needed for cough   Labwork as ordered     Tylenol or Ibuprofen as needed for discomfort

## 2020-03-04 NOTE — PROGRESS NOTES
Assessment/Plan:  1  Cough  - CBC and differential; Future  - XR chest pa & lateral; Future  - benzonatate (TESSALON) 200 MG capsule; Take 1 capsule (200 mg total) by mouth 3 (three) times a day as needed for cough  Dispense: 20 capsule; Refill: 0  2  Tobacco abuse  Smoking cessation counseling  - XR chest pa & lateral; Future  3  Alcoholism ([address] )  Counseled  Risks discussed  Anticipatory guidance  4  Essential hypertension  Stable  No change to current tx plan  Will check labs  - CBC and differential; Future  - Comprehensive metabolic panel; Future  5  Mixed hyperlipidemia  Stopped taking lipitor due to cost  Will check labs  Heart healthy diet  - CBC and differential; Future  - Comprehensive metabolic panel; Future  - Lipid panel; Future  6  Screening for HIV (human immunodeficiency virus)  - HIV 1/2 AG-AB combo; Future  7  Screening for malignant neoplasm of colon  - Cologuard; Future      BMI Counseling: Body mass index is 25 39 kg/m²  The BMI is above normal  Nutrition recommendations include consuming healthier snacks and moderation in carbohydrate intake  Exercise recommendations include exercising 3-5 times per week  Depression Screening Follow-up Plan: Patient's depression screening was negative with a PHQ-2 score of 0    Patient assessed for underlying major depression  They have no active suicidal ideations  Brief counseling provided and recommend additional follow-up/re-evaluation next office visit  Tobacco Cessation Counseling: Tobacco cessation counseling and education was provided  The patient is sincerely urged to quit consumption of tobacco  He is not ready to quit tobacco  The numerous health risks of tobacco consumption were discussed  If he decides to quit, there are a number of helpful adjunctive aids, and he can see me to discuss nicotine replacement therapy, chantix, or bupropion anytime in the future      Subjective:      Patient ID: Hawa Everett is a 60 y o  male who presents for cough    Here for cough  3 weeks  Right ribs hurt from coughing  No fever  Smokes about 15 cigarettes  No increased sob  Had viral uri a few weeks ago  Feels okay but concerned about persistent cough  Stopped taking some of his meds because could not afford them  The following portions of the patient's history were reviewed and updated as appropriate: allergies, current medications, past family history, past medical history, past social history, past surgical history and problem list     Review of Systems   Constitutional: Negative for fatigue, fever and unexpected weight change  HENT: Negative for congestion and sore throat  Respiratory: Positive for cough  Negative for chest tightness, shortness of breath and wheezing  Cardiovascular: Negative for chest pain, palpitations and leg swelling  Gastrointestinal: Negative for abdominal pain, diarrhea, nausea and vomiting  Musculoskeletal:        Pain left upper ribs at times when coughs   Skin: Negative for color change and pallor  Neurological: Negative for dizziness and headaches  Hematological: Negative for adenopathy  Objective:      /84 (BP Location: Right arm, Patient Position: Sitting, Cuff Size: Adult)   Pulse 88   Temp (!) 97 °F (36 1 °C) (Temporal)   Resp 20   Ht 5' 8" (1 727 m)   Wt 75 8 kg (167 lb)   SpO2 96%   BMI 25 39 kg/m²          Physical Exam   Constitutional: He is oriented to person, place, and time  He appears well-developed and well-nourished  No distress  Cardiovascular: Normal rate and regular rhythm  No JVD  No audible carotid bruit  No peripheral edema  Pulmonary/Chest: Effort normal and breath sounds normal  No respiratory distress  He has no wheezes  Musculoskeletal: He exhibits no edema  Neurological: He is alert and oriented to person, place, and time  No cranial nerve deficit  Coordination normal    Skin: Skin is warm and dry  No rash noted  He is not diaphoretic   No erythema  No pallor  Psychiatric: He has a normal mood and affect  His behavior is normal  Judgment and thought content normal    Vitals reviewed

## 2020-03-05 LAB — HIV 1+2 AB+HIV1 P24 AG SERPL QL IA: NORMAL

## 2020-03-25 ENCOUNTER — TELEMEDICINE (OUTPATIENT)
Dept: FAMILY MEDICINE CLINIC | Facility: CLINIC | Age: 60
End: 2020-03-25
Payer: MEDICARE

## 2020-03-25 DIAGNOSIS — R05.9 COUGH: ICD-10-CM

## 2020-03-25 DIAGNOSIS — E87.1 HYPONATREMIA: Primary | ICD-10-CM

## 2020-03-25 DIAGNOSIS — Z72.0 TOBACCO ABUSE: ICD-10-CM

## 2020-03-25 DIAGNOSIS — F10.20 ALCOHOLISM (HCC): ICD-10-CM

## 2020-03-25 DIAGNOSIS — I10 ESSENTIAL HYPERTENSION: ICD-10-CM

## 2020-03-25 PROCEDURE — 99442 PR PHYS/QHP TELEPHONE EVALUATION 11-20 MIN: CPT | Performed by: NURSE PRACTITIONER

## 2020-03-25 RX ORDER — LISINOPRIL AND HYDROCHLOROTHIAZIDE 25; 20 MG/1; MG/1
1 TABLET ORAL DAILY
Qty: 90 TABLET | Refills: 1 | Status: SHIPPED | OUTPATIENT
Start: 2020-03-25 | End: 2020-03-26 | Stop reason: SDUPTHER
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
## 2020-03-25 NOTE — PATIENT INSTRUCTIONS
As discussed, your sodium level was low  Labwork to recheck sodium level should be done within next 1-2 weeks  Smoking cessation and decreased alcohol consumption as discussed  Your cough is most likely secondary to your allergies and/or smoking  Call office if worsens or if you develop any new symptoms

## 2020-03-26 ENCOUNTER — TELEPHONE (OUTPATIENT)
Dept: FAMILY MEDICINE CLINIC | Facility: CLINIC | Age: 60
End: 2020-03-26

## 2020-03-26 DIAGNOSIS — I10 ESSENTIAL HYPERTENSION: Primary | ICD-10-CM

## 2020-03-26 RX ORDER — LISINOPRIL AND HYDROCHLOROTHIAZIDE 25; 20 MG/1; MG/1
1 TABLET ORAL DAILY
Qty: 30 TABLET | Refills: 3 | Status: SHIPPED | OUTPATIENT
Start: 2020-03-26 | End: 2020-07-23

## 2020-03-26 NOTE — TELEPHONE ENCOUNTER
Pt states that his medication lisinopril-hctz is expensive  For 90 days it is $30   If the medication sent for 30 days it is $12  Pt states that he can afford the 30 day supply   Please send to [address] [place] [address] in [place]

## 2020-06-03 ENCOUNTER — TELEPHONE (OUTPATIENT)
Dept: FAMILY MEDICINE CLINIC | Facility: CLINIC | Age: 60
End: 2020-06-03

## 2020-06-24 DIAGNOSIS — I10 ESSENTIAL HYPERTENSION: ICD-10-CM

## 2020-07-22 DIAGNOSIS — I10 ESSENTIAL HYPERTENSION: ICD-10-CM

## 2020-07-23 ENCOUNTER — TELEPHONE (OUTPATIENT)
Dept: VASCULAR SURGERY | Facility: CLINIC | Age: 60
End: 2020-07-23

## 2020-07-23 ENCOUNTER — HOSPITAL ENCOUNTER (EMERGENCY)
Facility: HOSPITAL | Age: 60
Discharge: HOME | End: 2020-07-23
Attending: EMERGENCY MEDICINE | Admitting: EMERGENCY MEDICINE
Payer: MEDICARE

## 2020-07-23 VITALS
DIASTOLIC BLOOD PRESSURE: 99 MMHG | WEIGHT: 165 LBS | HEART RATE: 75 BPM | RESPIRATION RATE: 20 BRPM | OXYGEN SATURATION: 98 % | BODY MASS INDEX: 25.09 KG/M2 | TEMPERATURE: 98 F | SYSTOLIC BLOOD PRESSURE: 164 MMHG

## 2020-07-23 DIAGNOSIS — I73.9 PERIPHERAL VASCULAR DISEASE (HCC): Primary | ICD-10-CM

## 2020-07-23 DIAGNOSIS — I10 ESSENTIAL HYPERTENSION: ICD-10-CM

## 2020-07-23 PROBLEM — I70.229 ATHEROSCLEROTIC PERIPHERAL VASCULAR DISEASE WITH REST PAIN (HCC): Status: ACTIVE | Noted: 2020-07-23

## 2020-07-23 LAB
ALBUMIN SERPL BCP-MCNC: 2.7 G/DL (ref 3.5–5)
ALP SERPL-CCNC: 52 U/L (ref 46–116)
ALT SERPL W P-5'-P-CCNC: 18 U/L (ref 12–78)
ANION GAP SERPL CALCULATED.3IONS-SCNC: 7 MMOL/L (ref 4–13)
AST SERPL W P-5'-P-CCNC: 22 U/L (ref 5–45)
BASOPHILS # BLD AUTO: 0.13 THOUSANDS/ΜL (ref 0–0.1)
BASOPHILS NFR BLD AUTO: 1 % (ref 0–1)
BILIRUB SERPL-MCNC: 0.6 MG/DL (ref 0.2–1)
BUN SERPL-MCNC: 11 MG/DL (ref 5–25)
CALCIUM SERPL-MCNC: 7.3 MG/DL (ref 8.3–10.1)
CHLORIDE SERPL-SCNC: 104 MMOL/L (ref 100–108)
CO2 SERPL-SCNC: 24 MMOL/L (ref 21–32)
CREAT SERPL-MCNC: 0.99 MG/DL (ref 0.6–1.3)
EOSINOPHIL # BLD AUTO: 0.62 THOUSAND/ΜL (ref 0–0.61)
EOSINOPHIL NFR BLD AUTO: 6 % (ref 0–6)
ERYTHROCYTE [DISTWIDTH] IN BLOOD BY AUTOMATED COUNT: 12.1 % (ref 11.6–15.1)
GFR SERPL CREATININE-BSD FRML MDRD: 82 ML/MIN/1.73SQ M
GLUCOSE SERPL-MCNC: 86 MG/DL (ref 65–140)
HCT VFR BLD AUTO: 51.9 % (ref 36.5–49.3)
HGB BLD-MCNC: 18.5 G/DL (ref 12–17)
IMM GRANULOCYTES # BLD AUTO: 0.02 THOUSAND/UL (ref 0–0.2)
IMM GRANULOCYTES NFR BLD AUTO: 0 % (ref 0–2)
LYMPHOCYTES # BLD AUTO: 2.82 THOUSANDS/ΜL (ref 0.6–4.47)
LYMPHOCYTES NFR BLD AUTO: 29 % (ref 14–44)
MCH RBC QN AUTO: 34.5 PG (ref 26.8–34.3)
MCHC RBC AUTO-ENTMCNC: 35.6 G/DL (ref 31.4–37.4)
MCV RBC AUTO: 97 FL (ref 82–98)
MONOCYTES # BLD AUTO: 1.06 THOUSAND/ΜL (ref 0.17–1.22)
MONOCYTES NFR BLD AUTO: 11 % (ref 4–12)
NEUTROPHILS # BLD AUTO: 5.23 THOUSANDS/ΜL (ref 1.85–7.62)
NEUTS SEG NFR BLD AUTO: 53 % (ref 43–75)
NRBC BLD AUTO-RTO: 0 /100 WBCS
PLATELET # BLD AUTO: 330 THOUSANDS/UL (ref 149–390)
PMV BLD AUTO: 9 FL (ref 8.9–12.7)
POTASSIUM SERPL-SCNC: 3.6 MMOL/L (ref 3.5–5.3)
PROT SERPL-MCNC: 6.5 G/DL (ref 6.4–8.2)
RBC # BLD AUTO: 5.36 MILLION/UL (ref 3.88–5.62)
SODIUM SERPL-SCNC: 135 MMOL/L (ref 136–145)
WBC # BLD AUTO: 9.88 THOUSAND/UL (ref 4.31–10.16)

## 2020-07-23 PROCEDURE — 96361 HYDRATE IV INFUSION ADD-ON: CPT

## 2020-07-23 PROCEDURE — 96360 HYDRATION IV INFUSION INIT: CPT

## 2020-07-23 PROCEDURE — 36415 COLL VENOUS BLD VENIPUNCTURE: CPT | Performed by: EMERGENCY MEDICINE

## 2020-07-23 PROCEDURE — 80053 COMPREHEN METABOLIC PANEL: CPT | Performed by: EMERGENCY MEDICINE

## 2020-07-23 PROCEDURE — 85025 COMPLETE CBC W/AUTO DIFF WBC: CPT | Performed by: EMERGENCY MEDICINE

## 2020-07-23 PROCEDURE — 99285 EMERGENCY DEPT VISIT HI MDM: CPT | Performed by: SURGERY

## 2020-07-23 PROCEDURE — 99284 EMERGENCY DEPT VISIT MOD MDM: CPT | Performed by: EMERGENCY MEDICINE

## 2020-07-23 PROCEDURE — 99284 EMERGENCY DEPT VISIT MOD MDM: CPT

## 2020-07-23 RX ORDER — OXYCODONE HYDROCHLORIDE AND ACETAMINOPHEN 5; 325 MG/1; MG/1
1 TABLET ORAL ONCE
Status: COMPLETED | OUTPATIENT
Start: 2020-07-23 | End: 2020-07-23

## 2020-07-23 RX ORDER — LISINOPRIL AND HYDROCHLOROTHIAZIDE 25; 20 MG/1; MG/1
TABLET ORAL
Qty: 90 TABLET | Refills: 0 | Status: SHIPPED | OUTPATIENT
Start: 2020-07-23 | End: 2020-07-23

## 2020-07-23 RX ORDER — LISINOPRIL AND HYDROCHLOROTHIAZIDE 25; 20 MG/1; MG/1
TABLET ORAL
Qty: 60 TABLET | Refills: 0 | Status: SHIPPED | OUTPATIENT
Start: 2020-07-23 | End: 2020-07-31 | Stop reason: SDUPTHER

## 2020-07-23 RX ORDER — ATORVASTATIN CALCIUM 80 MG/1
40 TABLET, FILM COATED ORAL
Status: DISCONTINUED | OUTPATIENT
Start: 2020-07-23 | End: 2020-07-23 | Stop reason: HOSPADM

## 2020-07-23 RX ORDER — ATORVASTATIN CALCIUM 40 MG/1
40 TABLET, FILM COATED ORAL DAILY
Qty: 30 TABLET | Refills: 0 | Status: SHIPPED | OUTPATIENT
Start: 2020-07-23 | End: 2020-07-31 | Stop reason: SDUPTHER

## 2020-07-23 RX ORDER — ASPIRIN 325 MG
325 TABLET ORAL ONCE
Status: COMPLETED | OUTPATIENT
Start: 2020-07-23 | End: 2020-07-23

## 2020-07-23 RX ADMIN — ASPIRIN 325 MG: 325 TABLET, FILM COATED ORAL at 11:40

## 2020-07-23 RX ADMIN — SODIUM CHLORIDE 1000 ML: 0.9 INJECTION, SOLUTION INTRAVENOUS at 11:36

## 2020-07-23 RX ADMIN — OXYCODONE HYDROCHLORIDE AND ACETAMINOPHEN 1 TABLET: 5; 325 TABLET ORAL at 11:40
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
## 2020-07-23 NOTE — TELEPHONE ENCOUNTER
Patient in ER, has ov with Lovely on 8/21 and needs LEAD prior to visit    Asked for a call back to schedule
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
## 2020-07-23 NOTE — ED NOTES
Pt states he wants to be discharged now so he can take care of his dog  Pt states he does not want to wait for his blood work  Doctor made aware        Sandra Fernandez RN  07/23/20 [id number]

## 2020-07-23 NOTE — TELEPHONE ENCOUNTER
Pt called back and I explained he needs an appt  Pt said he cant come now as he thinks he has Covid  He has SOB and severe pain in his left great toe  I offered a virtual appt today for further eval but the pt declined  He said he may just go to the ED  I explained that he should be evaluated today  He said he may go to the ED next week as he has to get food in the house  I stressed the importance of going to the ED today even though the SOB and toe pain have been going on for several weeks  Pt said he understands and will think about it  If/when he goes it will be to [facility]

## 2020-07-23 NOTE — ASSESSMENT & PLAN NOTE
Smoker x 50 yrs  -discussed relationship of atherosclerotic disease and tobacco use  -advised patient on smoking cessation  -continue with counseling

## 2020-07-23 NOTE — ASSESSMENT & PLAN NOTE
60yo M with anxiety, major depression with prior suicide attempt leading to large abdominal surgery, alcoholism, tobacco and marijuana use, HTN, HLD who presents to the [address] [place] [address] for evaluation of his left leg and foot pain  +rest pain on occasion but it woke him last night and he decided to seek medical attention; occasional intermittent claudication    -Patient seen and examined with Dr Molly Mendiola  On exam: toes on left foot dusky but M/S remain intact  Doppler signals to the left DP/PT & peroneal monophasic, Doppler R AT mono to biphasic and biphasic/triphasic PT  2+Femoral pulses B/L  Recommendations:  No acute limb ischemia needing urgent vascular intervention    M/S intact with doppler signals to foot; foot is warm and continues to be well-perfused   -will obtain outpatient LEADs for further evaluation  -start ASA 81mg daily today  -start statin medication  -Stop smoking; counseling provided   -discussed aggressive ambulation program with patient; he will start walking his dog more often  -f/u with PCP regarding long-term use of statin and lipid management  -Office visit with Dr Molly Mendiola scheduled from 8/21 and our office will reach out to schedule LEAD prior to Dan[facility] Rogers  -if any questions patient aware to call our office  -if acute changes to M/S of limb, patient aware to seek medical attention

## 2020-07-28 ENCOUNTER — TRANSCRIBE ORDERS (OUTPATIENT)
Dept: VASCULAR SURGERY | Facility: CLINIC | Age: 60
End: 2020-07-28

## 2020-07-28 DIAGNOSIS — I70.229 ATHEROSCLEROTIC PERIPHERAL VASCULAR DISEASE WITH REST PAIN (HCC): Primary | ICD-10-CM

## 2020-07-31 ENCOUNTER — OFFICE VISIT (OUTPATIENT)
Dept: FAMILY MEDICINE CLINIC | Facility: CLINIC | Age: 60
End: 2020-07-31
Payer: MEDICARE

## 2020-07-31 VITALS
HEIGHT: 68 IN | HEART RATE: 76 BPM | OXYGEN SATURATION: 97 % | RESPIRATION RATE: 16 BRPM | WEIGHT: 167 LBS | BODY MASS INDEX: 25.31 KG/M2 | DIASTOLIC BLOOD PRESSURE: 86 MMHG | SYSTOLIC BLOOD PRESSURE: 132 MMHG | TEMPERATURE: 97.6 F

## 2020-07-31 DIAGNOSIS — I10 ESSENTIAL HYPERTENSION: ICD-10-CM

## 2020-07-31 DIAGNOSIS — I73.9 PERIPHERAL VASCULAR DISEASE (HCC): ICD-10-CM

## 2020-07-31 DIAGNOSIS — I70.229 ATHEROSCLEROTIC PERIPHERAL VASCULAR DISEASE WITH REST PAIN (HCC): Primary | ICD-10-CM

## 2020-07-31 PROCEDURE — 4004F PT TOBACCO SCREEN RCVD TLK: CPT | Performed by: NURSE PRACTITIONER

## 2020-07-31 PROCEDURE — 3075F SYST BP GE 130 - 139MM HG: CPT | Performed by: NURSE PRACTITIONER

## 2020-07-31 PROCEDURE — 3079F DIAST BP 80-89 MM HG: CPT | Performed by: NURSE PRACTITIONER

## 2020-07-31 PROCEDURE — 3008F BODY MASS INDEX DOCD: CPT | Performed by: NURSE PRACTITIONER

## 2020-07-31 PROCEDURE — 99214 OFFICE O/P EST MOD 30 MIN: CPT | Performed by: NURSE PRACTITIONER

## 2020-07-31 RX ORDER — ASPIRIN 81 MG/1
81 TABLET ORAL DAILY
COMMUNITY

## 2020-07-31 RX ORDER — ATORVASTATIN CALCIUM 40 MG/1
40 TABLET, FILM COATED ORAL DAILY
Qty: 30 TABLET | Refills: 5 | Status: SHIPPED | OUTPATIENT
Start: 2020-07-31 | End: 2020-10-26 | Stop reason: SDUPTHER

## 2020-07-31 RX ORDER — LISINOPRIL AND HYDROCHLOROTHIAZIDE 25; 20 MG/1; MG/1
1 TABLET ORAL DAILY
Qty: 30 TABLET | Refills: 5 | Status: SHIPPED | OUTPATIENT
Start: 2020-07-31 | End: 2020-10-26 | Stop reason: SDUPTHER

## 2020-07-31 NOTE — PROGRESS NOTES
Assessment/Plan:  1  Atherosclerotic peripheral vascular disease with rest pain (HCC)  Continue ASA  Continue statin  Follow up with vascular as scheduled  Smoking cessation discussed in depth  2  Peripheral vascular disease (HCC)  Continue ASA  Continue statin  Follow up with vascular as scheduled  Smoking cessation discussed in depth  - atorvastatin (LIPITOR) 40 mg tablet; Take 1 tablet (40 mg total) by mouth daily  Dispense: 30 tablet; Refill: 5  3  Essential hypertension  Continue blood pressure medications as ordered  Monitor blood pressure  Bring diary/log of readings to next appointment  - lisinopril-hydrochlorothiazide (PRINZIDE,ZESTORETIC) 20-25 MG per tablet; Take 1 tablet by mouth daily  Dispense: 30 tablet; Refill: 5    Patient was counseled regarding instructions for management which included: impression/diagnosis, risk/benefits of treatment options, importance of compliance with treatment, risk factor reduction, and prognosis  I have reviewed the instructions with the patient answering all questions and patient verbalized understanding  Tobacco Cessation Counseling: Tobacco cessation counseling and education was provided  The patient is sincerely urged to quit consumption of tobacco  He is not ready to quit tobacco  The numerous health risks of tobacco consumption were discussed  If he decides to quit, there are a number of helpful adjunctive aids, and he can see me to discuss nicotine replacement therapy, chantix, or bupropion anytime in the future  BMI Counseling: Body mass index is 25 39 kg/m²  The BMI is above normal  Nutrition recommendations include reducing portion sizes and decreasing overall calorie intake  Exercise recommendations include exercising 3-5 times per week  Subjective:      Patient ID: Jhoan Arceo is a 63 y o  male who presents for ED follow up    Here for ED follow up  Pain toes both feet , left foot worse than right   Purple color at times  Pain is worse at rest  Smokes marijuana daily  Currently smoking 8 cigs per day  Was smoking about 1 ppd  Has tried nicoderm patches in past  Does not want to try Chantix but may be willing to consider in future  No chest pain or sob  Had stopped taking cholesterol med in past and it was restarted at higher dose in ED by Dr Maycol Thorne meds with no side effects  The following portions of the patient's history were reviewed and updated as appropriate: allergies, current medications, past family history, past medical history, past social history, past surgical history and problem list     Review of Systems   Constitutional: Negative for unexpected weight change  Respiratory: Negative for chest tightness and shortness of breath  Cardiovascular: Negative for chest pain, palpitations and leg swelling  Musculoskeletal:        Pain in both feet, left worse than right   Skin: Positive for color change (toes)  Neurological: Negative for dizziness, numbness and headaches  Objective:  ED records reviewed 7/23/2020  Painful toes and "turning different colors"  Per ED record, no palpable pulse at popliteal, PT, DP  Evidence of severe PAD with ischemic rest pain  Vascular surgery consult and IR for possible angio  Scheduled to see vascular surgery, Dr Claribel Gomez 8/21/2020  Seen in ED by [facility]  Per vascular, pt found to have SFA popliteal occlusive disease  To be scheduled for outpatient angiogram- notes and recommendations reviewed  Recommend start daily aspirin and statin, smoking cessation, increase ambulation      /86 (BP Location: Right arm, Patient Position: Sitting, Cuff Size: Adult)   Pulse 76   Temp 97 6 °F (36 4 °C) (Temporal)   Resp 16   Ht 5' 8" (1 727 m)   Wt 75 8 kg (167 lb)   SpO2 97%   BMI 25 39 kg/m²          Physical Exam   Constitutional: He is oriented to person, place, and time  He appears well-developed and well-nourished     Cardiovascular: Normal rate and regular rhythm  No palpable pedal, post tibial pulses palpable left  Palpable popliteal pulse, faint  Left foot discolored, toes cyanotic  Foot and toes warm to touch  Right foot mildly discolored  Faint right pedal pulse palpable  No JVD  No audible carotid bruit  No peripheral edema  Pulmonary/Chest: Effort normal and breath sounds normal  No respiratory distress  He has no wheezes  Neurological: He is alert and oriented to person, place, and time  No cranial nerve deficit  Coordination normal    Skin: Capillary refill takes more than 3 seconds  Toes/feet discolored bilaterally, left worse than right   Psychiatric: He has a normal mood and affect  His behavior is normal  Judgment and thought content normal    Vitals reviewed

## 2020-07-31 NOTE — PATIENT INSTRUCTIONS
Increase daily exercise/walking as discussed  Smoking cessation is crucial    Continue all current medications as ordered  Keep appt with vascular surgery and for testing as ordered by vascular group  Aleve as needed

## 2020-08-13 ENCOUNTER — TRANSCRIBE ORDERS (OUTPATIENT)
Dept: ADMINISTRATIVE | Facility: HOSPITAL | Age: 60
End: 2020-08-13

## 2020-08-13 ENCOUNTER — HOSPITAL ENCOUNTER (OUTPATIENT)
Dept: RADIOLOGY | Facility: HOSPITAL | Age: 60
Discharge: HOME | End: 2020-08-13
Payer: MEDICARE

## 2020-08-13 DIAGNOSIS — I70.229 ATHEROSCLEROTIC PERIPHERAL VASCULAR DISEASE WITH REST PAIN (HCC): ICD-10-CM

## 2020-08-13 PROCEDURE — 93925 LOWER EXTREMITY STUDY: CPT | Performed by: SURGERY

## 2020-08-13 PROCEDURE — 93922 UPR/L XTREMITY ART 2 LEVELS: CPT | Performed by: SURGERY

## 2020-08-13 PROCEDURE — 93925 LOWER EXTREMITY STUDY: CPT

## 2020-08-13 PROCEDURE — 93923 UPR/LXTR ART STDY 3+ LVLS: CPT

## 2020-08-21 ENCOUNTER — OFFICE VISIT (OUTPATIENT)
Dept: VASCULAR SURGERY | Facility: CLINIC | Age: 60
End: 2020-08-21
Payer: MEDICARE

## 2020-08-21 VITALS
BODY MASS INDEX: 25.01 KG/M2 | SYSTOLIC BLOOD PRESSURE: 140 MMHG | WEIGHT: 165 LBS | HEIGHT: 68 IN | DIASTOLIC BLOOD PRESSURE: 90 MMHG | HEART RATE: 72 BPM | TEMPERATURE: 98.5 F

## 2020-08-21 DIAGNOSIS — I70.229 ATHEROSCLEROTIC PERIPHERAL VASCULAR DISEASE WITH REST PAIN (HCC): Primary | ICD-10-CM

## 2020-08-21 DIAGNOSIS — E78.2 MIXED HYPERLIPIDEMIA: ICD-10-CM

## 2020-08-21 DIAGNOSIS — F10.20 ALCOHOLISM (HCC): ICD-10-CM

## 2020-08-21 DIAGNOSIS — Z72.0 TOBACCO ABUSE: ICD-10-CM

## 2020-08-21 DIAGNOSIS — I10 ESSENTIAL HYPERTENSION: ICD-10-CM

## 2020-08-21 DIAGNOSIS — F41.9 ANXIETY DISORDER, UNSPECIFIED TYPE: ICD-10-CM

## 2020-08-21 DIAGNOSIS — R09.89 BRUIT (ARTERIAL): ICD-10-CM

## 2020-08-21 PROCEDURE — 3080F DIAST BP >= 90 MM HG: CPT | Performed by: SURGERY

## 2020-08-21 PROCEDURE — 3077F SYST BP >= 140 MM HG: CPT | Performed by: SURGERY

## 2020-08-21 PROCEDURE — 3008F BODY MASS INDEX DOCD: CPT | Performed by: SURGERY

## 2020-08-21 PROCEDURE — 4004F PT TOBACCO SCREEN RCVD TLK: CPT | Performed by: SURGERY

## 2020-08-21 PROCEDURE — 99214 OFFICE O/P EST MOD 30 MIN: CPT | Performed by: SURGERY

## 2020-08-21 NOTE — ASSESSMENT & PLAN NOTE
Questionable cervical bruit  Will obtain carotid duplex  Patient without history of TIA or CVA  Patient denies TIA/CVA symptoms

## 2020-08-21 NOTE — PROGRESS NOTES
[facility]  08/21/20     Assessment/Plan:    Tobacco abuse  Counseled on the importance of smoking cessation and methods to achieve success  Hyperlipidemia  Recently initiated Lipitor 40 mg daily  Continue  Managed by PCP  Atherosclerotic peripheral vascular disease with rest pain (HCC)  Rest pain no longer present  No evidence of tissue loss  Claudication is non disabling  Patient instructed on the importance of continuing daily aspirin/statin  Patient struck down the importance of smoking cessation  Patient instructed on the importance of initiating a daily walking exercise program   Patient will return in 6 months with repeat noninvasive vascular testing and reassessment  Bruit (arterial)  Questionable cervical bruit  Will obtain carotid duplex  Patient without history of TIA or CVA  Patient denies TIA/CVA symptoms  Diagnoses and all orders for this visit:    Atherosclerotic peripheral vascular disease with rest pain ([address] )  -     VAS carotid complete study; Future  -     VAS lower limb arterial duplex, limited/unilateral; Future    Bruit (arterial)  -     VAS carotid complete study; Future  -     VAS lower limb arterial duplex, limited/unilateral; Future    Essential hypertension    Alcoholism (HCC)    Anxiety disorder, unspecified type    Mixed hyperlipidemia    Tobacco abuse          Subjective:      Patient ID: Sánchez Glez is a 63 y o  male  New patient, presented to [facility] ED with acute onset of left foot pain  Patient was noted to have motor sensory intact however no palpable pulses were appreciated below the femoral artery  Patient underwent noninvasive arterial testing which revealed an ASHLEE of 0 4 with the Hector the metatarsal and great toe pressures and duplex portion of the study consistent with greater than 75% stenosis of the SFA and appearance of an occluded profundus femoral artery  Today, patient reports no further rest pain    He has some claudication symptoms however this is not disabling to him  He has now started an appropriate medical regimen with a statin and 81 mg aspirin daily  Additionally, he has significantly reduced his smoking  He is now smoking approximately 8-10 cigarettes a day compared to 2 packs per day  Patient denies tissue loss left foot  Instructed the patient on the importance of continuing smoking cessation and to initiate a daily walking regimen  Imaging:  LEAD performed 08/13/2020  ASHLEE on the right 0 79  ASHLEE in the left 0 4 with metatarsal pressure of 40 and great toe pressure of 38 mmHg  The profundus femoral artery by duplex is occluded  Severe stenosis is noted in the left SFA  Past Vascular Surgery: Amee Dan  has a history of no vascular intervention  The following portions of the patient's history were reviewed and updated as appropriate: allergies, current medications, past family history, past medical history, past social history, past surgical history and problem list     Review of Systems   Constitutional: Negative  HENT: Negative  Eyes: Negative  Respiratory: Negative  Cardiovascular: Negative  Gastrointestinal: Negative  Endocrine: Negative  Genitourinary: Negative  Musculoskeletal: Negative  Skin: Negative  Allergic/Immunologic: Negative  Neurological: Negative  Hematological: Negative  Psychiatric/Behavioral: Negative  Objective:    /90 (BP Location: Right arm, Patient Position: Sitting)   Pulse 72   Temp 98 5 °F (36 9 °C) (Tympanic)   Ht 5' 8" (1 727 m)   Wt 74 8 kg (165 lb)   BMI 25 09 kg/m²        Physical Exam  Vitals signs and nursing note reviewed  Constitutional:       Appearance: He is well-developed  HENT:      Head: Normocephalic and atraumatic  Eyes:      Conjunctiva/sclera: Conjunctivae normal       Pupils: Pupils are equal, round, and reactive to light  Neck:      Musculoskeletal: Normal range of motion and neck supple  Vascular: No JVD  Cardiovascular:      Rate and Rhythm: Normal rate and regular rhythm  Heart sounds: Normal heart sounds  Pulmonary:      Effort: Pulmonary effort is normal  No respiratory distress  Breath sounds: Normal breath sounds  No stridor  No wheezing or rales  Chest:      Chest wall: No tenderness  Abdominal:      General: There is no distension  Palpations: Abdomen is soft  There is no mass  Tenderness: There is no abdominal tenderness  There is no guarding or rebound  Musculoskeletal: Normal range of motion  General: No tenderness or deformity  Skin:     General: Skin is warm and dry  Findings: No erythema or rash  Neurological:      Mental Status: He is alert and oriented to person, place, and time  Psychiatric:         Behavior: Behavior normal          Thought Content:  Thought content normal            Pulse Exam  L brachial + R brachial +   L radial + R radial +   L inguinal + R inguinal +   L popliteal D R popliteal D   L posterior tibial D R posterior tibial D   L peroneal D R peroneal D   L dorsalis pedis D R dorsalis pedis D

## 2020-08-21 NOTE — LETTER
August 21, 2020     Dayami Anand, [address] [address]    Patient: Emilee Silver   YOB: 1960   Date of Visit: 8/21/2020       Dear Dr Dia Gibson: Thank you for referring Sanjayapolinar You to me for evaluation  Below are the relevant portions of my assessment and plan of care  New patient, presented to [facility] ED with acute onset of left foot pain  Patient was noted to have motor sensory intact however no palpable pulses were appreciated below the femoral artery  Patient underwent noninvasive arterial testing which revealed an ASHLEE of 0 4 with the Hector the metatarsal and great toe pressures and duplex portion of the study consistent with greater than 75% stenosis of the SFA and appearance of an occluded profundus femoral artery  Today, patient reports no further rest pain  He has some claudication symptoms however this is not disabling to him  He has now started an appropriate medical regimen with a statin and 81 mg aspirin daily  Additionally, he has significantly reduced his smoking  He is now smoking approximately 8-10 cigarettes a day compared to 2 packs per day  Patient denies tissue loss left foot  Instructed the patient on the importance of continuing smoking cessation and to initiate a daily walking regimen  If you have questions, please do not hesitate to call me  I look forward to following Marita Peres along with you           Sincerely,        Rin Vallecillo MD        CC: Mg Sandy

## 2020-08-21 NOTE — ASSESSMENT & PLAN NOTE
Rest pain no longer present  No evidence of tissue loss  Claudication is non disabling  Patient instructed on the importance of continuing daily aspirin/statin  Patient struck down the importance of smoking cessation  Patient instructed on the importance of initiating a daily walking exercise program   Patient will return in 6 months with repeat noninvasive vascular testing and reassessment

## 2020-10-26 ENCOUNTER — OFFICE VISIT (OUTPATIENT)
Dept: FAMILY MEDICINE CLINIC | Facility: CLINIC | Age: 60
End: 2020-10-26
Payer: MEDICARE

## 2020-10-26 VITALS
HEIGHT: 68 IN | SYSTOLIC BLOOD PRESSURE: 152 MMHG | BODY MASS INDEX: 25.91 KG/M2 | TEMPERATURE: 97.6 F | HEART RATE: 74 BPM | WEIGHT: 171 LBS | DIASTOLIC BLOOD PRESSURE: 80 MMHG | RESPIRATION RATE: 18 BRPM

## 2020-10-26 DIAGNOSIS — I73.9 PERIPHERAL VASCULAR DISEASE (HCC): ICD-10-CM

## 2020-10-26 DIAGNOSIS — Z00.00 MEDICARE ANNUAL WELLNESS VISIT, SUBSEQUENT: Primary | ICD-10-CM

## 2020-10-26 DIAGNOSIS — Z23 NEED FOR 23-POLYVALENT PNEUMOCOCCAL POLYSACCHARIDE VACCINE: ICD-10-CM

## 2020-10-26 DIAGNOSIS — I10 ESSENTIAL HYPERTENSION: ICD-10-CM

## 2020-10-26 DIAGNOSIS — Z23 NEED FOR IMMUNIZATION AGAINST INFLUENZA: ICD-10-CM

## 2020-10-26 PROCEDURE — G0439 PPPS, SUBSEQ VISIT: HCPCS | Performed by: NURSE PRACTITIONER

## 2020-10-26 PROCEDURE — G0008 ADMIN INFLUENZA VIRUS VAC: HCPCS

## 2020-10-26 PROCEDURE — 90682 RIV4 VACC RECOMBINANT DNA IM: CPT

## 2020-10-26 PROCEDURE — 90732 PPSV23 VACC 2 YRS+ SUBQ/IM: CPT

## 2020-10-26 PROCEDURE — G0009 ADMIN PNEUMOCOCCAL VACCINE: HCPCS

## 2020-10-26 RX ORDER — LISINOPRIL AND HYDROCHLOROTHIAZIDE 25; 20 MG/1; MG/1
1 TABLET ORAL DAILY
Qty: 30 TABLET | Refills: 5 | Status: SHIPPED | OUTPATIENT
Start: 2020-10-26 | End: 2021-05-05 | Stop reason: SDUPTHER

## 2020-10-26 RX ORDER — ATORVASTATIN CALCIUM 40 MG/1
40 TABLET, FILM COATED ORAL DAILY
Qty: 30 TABLET | Refills: 5 | Status: SHIPPED | OUTPATIENT
Start: 2020-10-26 | End: 2021-05-05 | Stop reason: SDUPTHER

## 2020-11-05 ENCOUNTER — OFFICE VISIT (OUTPATIENT)
Dept: FAMILY MEDICINE CLINIC | Facility: CLINIC | Age: 60
End: 2020-11-05
Payer: MEDICARE

## 2020-11-05 VITALS
HEIGHT: 68 IN | BODY MASS INDEX: 25.91 KG/M2 | RESPIRATION RATE: 12 BRPM | OXYGEN SATURATION: 98 % | DIASTOLIC BLOOD PRESSURE: 76 MMHG | SYSTOLIC BLOOD PRESSURE: 126 MMHG | TEMPERATURE: 97.4 F | WEIGHT: 171 LBS | HEART RATE: 84 BPM

## 2020-11-05 DIAGNOSIS — Z72.0 TOBACCO ABUSE: ICD-10-CM

## 2020-11-05 DIAGNOSIS — E78.2 MIXED HYPERLIPIDEMIA: ICD-10-CM

## 2020-11-05 DIAGNOSIS — R53.83 FATIGUE, UNSPECIFIED TYPE: ICD-10-CM

## 2020-11-05 DIAGNOSIS — I70.229 ATHEROSCLEROTIC PERIPHERAL VASCULAR DISEASE WITH REST PAIN (HCC): ICD-10-CM

## 2020-11-05 DIAGNOSIS — F10.20 ALCOHOLISM (HCC): ICD-10-CM

## 2020-11-05 DIAGNOSIS — I10 ESSENTIAL HYPERTENSION: Primary | ICD-10-CM

## 2020-11-05 PROCEDURE — 99214 OFFICE O/P EST MOD 30 MIN: CPT | Performed by: NURSE PRACTITIONER

## 2021-04-01 ENCOUNTER — OFFICE VISIT (OUTPATIENT)
Dept: CARDIOLOGY CLINIC | Facility: CLINIC | Age: 61
End: 2021-04-01
Payer: MEDICARE

## 2021-04-01 VITALS
HEART RATE: 72 BPM | HEIGHT: 68 IN | SYSTOLIC BLOOD PRESSURE: 142 MMHG | TEMPERATURE: 98.2 F | WEIGHT: 175 LBS | OXYGEN SATURATION: 98 % | DIASTOLIC BLOOD PRESSURE: 80 MMHG | BODY MASS INDEX: 26.52 KG/M2

## 2021-04-01 DIAGNOSIS — R09.89 BRUIT (ARTERIAL): ICD-10-CM

## 2021-04-01 DIAGNOSIS — E78.2 MIXED HYPERLIPIDEMIA: ICD-10-CM

## 2021-04-01 DIAGNOSIS — I10 ESSENTIAL HYPERTENSION: Primary | ICD-10-CM

## 2021-04-01 DIAGNOSIS — R07.9 CHEST PAIN IN ADULT: ICD-10-CM

## 2021-04-01 DIAGNOSIS — I70.229 ATHEROSCLEROTIC PERIPHERAL VASCULAR DISEASE WITH REST PAIN (HCC): ICD-10-CM

## 2021-04-01 DIAGNOSIS — M95.4 CHEST WALL DEFORMITY: ICD-10-CM

## 2021-04-01 PROCEDURE — 99204 OFFICE O/P NEW MOD 45 MIN: CPT | Performed by: INTERNAL MEDICINE

## 2021-04-01 PROCEDURE — 93000 ELECTROCARDIOGRAM COMPLETE: CPT | Performed by: INTERNAL MEDICINE
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
## 2021-05-05 DIAGNOSIS — I73.9 PERIPHERAL VASCULAR DISEASE (HCC): ICD-10-CM

## 2021-05-05 DIAGNOSIS — I10 ESSENTIAL HYPERTENSION: ICD-10-CM

## 2021-05-05 RX ORDER — ATORVASTATIN CALCIUM 40 MG/1
40 TABLET, FILM COATED ORAL DAILY
Qty: 30 TABLET | Refills: 5 | Status: SHIPPED | OUTPATIENT
Start: 2021-05-05 | End: 2021-06-08

## 2021-05-05 RX ORDER — LISINOPRIL AND HYDROCHLOROTHIAZIDE 25; 20 MG/1; MG/1
1 TABLET ORAL DAILY
Qty: 30 TABLET | Refills: 5 | Status: SHIPPED | OUTPATIENT
Start: 2021-05-05 | End: 2021-05-18 | Stop reason: HOSPADM

## 2021-05-15 ENCOUNTER — APPOINTMENT (EMERGENCY)
Dept: RADIOLOGY | Facility: HOSPITAL | Age: 61
End: 2021-05-15
Payer: MEDICARE

## 2021-05-15 ENCOUNTER — HOSPITAL ENCOUNTER (EMERGENCY)
Facility: HOSPITAL | Age: 61
Discharge: HOME | End: 2021-05-15
Attending: EMERGENCY MEDICINE | Admitting: EMERGENCY MEDICINE
Payer: MEDICARE

## 2021-05-15 VITALS
HEART RATE: 73 BPM | DIASTOLIC BLOOD PRESSURE: 66 MMHG | RESPIRATION RATE: 24 BRPM | WEIGHT: 160 LBS | OXYGEN SATURATION: 99 % | SYSTOLIC BLOOD PRESSURE: 122 MMHG | TEMPERATURE: 98 F | BODY MASS INDEX: 24.33 KG/M2

## 2021-05-15 DIAGNOSIS — R07.9 CHEST PAIN: Primary | ICD-10-CM

## 2021-05-15 DIAGNOSIS — N17.9 ACUTE KIDNEY INJURY (HCC): ICD-10-CM

## 2021-05-15 LAB
ALBUMIN SERPL BCP-MCNC: 2.8 G/DL (ref 3.5–5)
ALP SERPL-CCNC: 164 U/L (ref 46–116)
ALT SERPL W P-5'-P-CCNC: 94 U/L (ref 12–78)
ANION GAP SERPL CALCULATED.3IONS-SCNC: 13 MMOL/L (ref 4–13)
APTT PPP: 23 SECONDS (ref 23–37)
AST SERPL W P-5'-P-CCNC: 151 U/L (ref 5–45)
BASOPHILS # BLD AUTO: 0.09 THOUSANDS/ΜL (ref 0–0.1)
BASOPHILS NFR BLD AUTO: 1 % (ref 0–1)
BILIRUB SERPL-MCNC: 1.43 MG/DL (ref 0.2–1)
BUN SERPL-MCNC: 28 MG/DL (ref 5–25)
CALCIUM ALBUM COR SERPL-MCNC: 9.5 MG/DL (ref 8.3–10.1)
CALCIUM SERPL-MCNC: 8.5 MG/DL (ref 8.3–10.1)
CHLORIDE SERPL-SCNC: 99 MMOL/L (ref 100–108)
CO2 SERPL-SCNC: 26 MMOL/L (ref 21–32)
CREAT SERPL-MCNC: 2.02 MG/DL (ref 0.6–1.3)
EOSINOPHIL # BLD AUTO: 0.11 THOUSAND/ΜL (ref 0–0.61)
EOSINOPHIL NFR BLD AUTO: 1 % (ref 0–6)
ERYTHROCYTE [DISTWIDTH] IN BLOOD BY AUTOMATED COUNT: 13.3 % (ref 11.6–15.1)
GFR SERPL CREATININE-BSD FRML MDRD: 35 ML/MIN/1.73SQ M
GLUCOSE SERPL-MCNC: 118 MG/DL (ref 65–140)
HCT VFR BLD AUTO: 40.5 % (ref 36.5–49.3)
HGB BLD-MCNC: 14.2 G/DL (ref 12–17)
IMM GRANULOCYTES # BLD AUTO: 0.01 THOUSAND/UL (ref 0–0.2)
IMM GRANULOCYTES NFR BLD AUTO: 0 % (ref 0–2)
INR PPP: 0.88 (ref 0.84–1.19)
LIPASE SERPL-CCNC: 309 U/L (ref 73–393)
LYMPHOCYTES # BLD AUTO: 3.71 THOUSANDS/ΜL (ref 0.6–4.47)
LYMPHOCYTES NFR BLD AUTO: 38 % (ref 14–44)
MCH RBC QN AUTO: 34 PG (ref 26.8–34.3)
MCHC RBC AUTO-ENTMCNC: 35.1 G/DL (ref 31.4–37.4)
MCV RBC AUTO: 97 FL (ref 82–98)
MONOCYTES # BLD AUTO: 0.81 THOUSAND/ΜL (ref 0.17–1.22)
MONOCYTES NFR BLD AUTO: 8 % (ref 4–12)
NEUTROPHILS # BLD AUTO: 5.16 THOUSANDS/ΜL (ref 1.85–7.62)
NEUTS SEG NFR BLD AUTO: 52 % (ref 43–75)
NRBC BLD AUTO-RTO: 0 /100 WBCS
PLATELET # BLD AUTO: 206 THOUSANDS/UL (ref 149–390)
PMV BLD AUTO: 10.1 FL (ref 8.9–12.7)
POTASSIUM SERPL-SCNC: 3.1 MMOL/L (ref 3.5–5.3)
PROT SERPL-MCNC: 7.4 G/DL (ref 6.4–8.2)
PROTHROMBIN TIME: 11.9 SECONDS (ref 11.6–14.5)
RBC # BLD AUTO: 4.18 MILLION/UL (ref 3.88–5.62)
SARS-COV-2 RNA RESP QL NAA+PROBE: NEGATIVE
SODIUM SERPL-SCNC: 138 MMOL/L (ref 136–145)
TROPONIN I SERPL-MCNC: <0.02 NG/ML
WBC # BLD AUTO: 9.89 THOUSAND/UL (ref 4.31–10.16)

## 2021-05-15 PROCEDURE — 93005 ELECTROCARDIOGRAM TRACING: CPT

## 2021-05-15 PROCEDURE — U0005 INFEC AGEN DETEC AMPLI PROBE: HCPCS | Performed by: EMERGENCY MEDICINE

## 2021-05-15 PROCEDURE — 99285 EMERGENCY DEPT VISIT HI MDM: CPT

## 2021-05-15 PROCEDURE — 83690 ASSAY OF LIPASE: CPT | Performed by: EMERGENCY MEDICINE

## 2021-05-15 PROCEDURE — U0003 INFECTIOUS AGENT DETECTION BY NUCLEIC ACID (DNA OR RNA); SEVERE ACUTE RESPIRATORY SYNDROME CORONAVIRUS 2 (SARS-COV-2) (CORONAVIRUS DISEASE [COVID-19]), AMPLIFIED PROBE TECHNIQUE, MAKING USE OF HIGH THROUGHPUT TECHNOLOGIES AS DESCRIBED BY CMS-2020-01-R: HCPCS | Performed by: EMERGENCY MEDICINE

## 2021-05-15 PROCEDURE — 71275 CT ANGIOGRAPHY CHEST: CPT

## 2021-05-15 PROCEDURE — 85025 COMPLETE CBC W/AUTO DIFF WBC: CPT | Performed by: EMERGENCY MEDICINE

## 2021-05-15 PROCEDURE — 96360 HYDRATION IV INFUSION INIT: CPT

## 2021-05-15 PROCEDURE — 84484 ASSAY OF TROPONIN QUANT: CPT | Performed by: EMERGENCY MEDICINE

## 2021-05-15 PROCEDURE — 80053 COMPREHEN METABOLIC PANEL: CPT | Performed by: EMERGENCY MEDICINE

## 2021-05-15 PROCEDURE — 85610 PROTHROMBIN TIME: CPT | Performed by: EMERGENCY MEDICINE

## 2021-05-15 PROCEDURE — 36415 COLL VENOUS BLD VENIPUNCTURE: CPT | Performed by: EMERGENCY MEDICINE

## 2021-05-15 PROCEDURE — 74174 CTA ABD&PLVS W/CONTRAST: CPT

## 2021-05-15 PROCEDURE — 99284 EMERGENCY DEPT VISIT MOD MDM: CPT | Performed by: EMERGENCY MEDICINE

## 2021-05-15 PROCEDURE — 85730 THROMBOPLASTIN TIME PARTIAL: CPT | Performed by: EMERGENCY MEDICINE

## 2021-05-15 PROCEDURE — G1004 CDSM NDSC: HCPCS

## 2021-05-15 RX ORDER — NITROGLYCERIN 0.4 MG/1
0.4 TABLET SUBLINGUAL ONCE
Status: COMPLETED | OUTPATIENT
Start: 2021-05-15 | End: 2021-05-15

## 2021-05-15 RX ORDER — SODIUM CHLORIDE 9 MG/ML
125 INJECTION, SOLUTION INTRAVENOUS CONTINUOUS
Status: DISCONTINUED | OUTPATIENT
Start: 2021-05-15 | End: 2021-05-15 | Stop reason: HOSPADM

## 2021-05-15 RX ADMIN — SODIUM CHLORIDE 1000 ML: 0.9 INJECTION, SOLUTION INTRAVENOUS at 14:20

## 2021-05-15 RX ADMIN — NITROGLYCERIN 0.4 MG: 0.4 TABLET SUBLINGUAL at 14:24

## 2021-05-15 RX ADMIN — IODIXANOL 70 ML: 320 INJECTION, SOLUTION INTRAVASCULAR at 15:01

## 2021-05-15 NOTE — DISCHARGE INSTRUCTIONS
We advised you to stay in the hospital for more testing  You decided to go home and come back at a later time  If you have recurrent chest pain, please call 911 immediately  You definitely need your kidney function monitored and your chest pain evaluated
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
## 2021-05-18 ENCOUNTER — OFFICE VISIT (OUTPATIENT)
Dept: FAMILY MEDICINE CLINIC | Facility: CLINIC | Age: 61
End: 2021-05-18
Payer: MEDICARE

## 2021-05-18 VITALS
RESPIRATION RATE: 20 BRPM | OXYGEN SATURATION: 97 % | SYSTOLIC BLOOD PRESSURE: 118 MMHG | TEMPERATURE: 97.2 F | HEIGHT: 69 IN | HEART RATE: 84 BPM | BODY MASS INDEX: 24.44 KG/M2 | WEIGHT: 165 LBS | DIASTOLIC BLOOD PRESSURE: 58 MMHG

## 2021-05-18 DIAGNOSIS — I70.229 ATHEROSCLEROTIC PERIPHERAL VASCULAR DISEASE WITH REST PAIN (HCC): ICD-10-CM

## 2021-05-18 DIAGNOSIS — I10 ESSENTIAL HYPERTENSION: ICD-10-CM

## 2021-05-18 DIAGNOSIS — R10.84 GENERALIZED ABDOMINAL PAIN: ICD-10-CM

## 2021-05-18 DIAGNOSIS — F33.9 DEPRESSION, RECURRENT (HCC): ICD-10-CM

## 2021-05-18 DIAGNOSIS — E87.6 HYPOKALEMIA: ICD-10-CM

## 2021-05-18 DIAGNOSIS — R79.89 ELEVATED LFTS: ICD-10-CM

## 2021-05-18 DIAGNOSIS — F10.20 ALCOHOLISM (HCC): ICD-10-CM

## 2021-05-18 DIAGNOSIS — R07.9 CHEST PAIN, UNSPECIFIED TYPE: Primary | ICD-10-CM

## 2021-05-18 DIAGNOSIS — N17.9 AKI (ACUTE KIDNEY INJURY) (HCC): ICD-10-CM

## 2021-05-18 LAB
ALBUMIN SERPL BCP-MCNC: 2.8 G/DL (ref 3.5–5)
ALP SERPL-CCNC: 188 U/L (ref 46–116)
ALT SERPL W P-5'-P-CCNC: 87 U/L (ref 12–78)
ANION GAP SERPL CALCULATED.3IONS-SCNC: 9 MMOL/L (ref 4–13)
AST SERPL W P-5'-P-CCNC: 129 U/L (ref 5–45)
BILIRUB SERPL-MCNC: 1.05 MG/DL (ref 0.2–1)
BUN SERPL-MCNC: 27 MG/DL (ref 5–25)
CALCIUM ALBUM COR SERPL-MCNC: 10 MG/DL (ref 8.3–10.1)
CALCIUM SERPL-MCNC: 9 MG/DL (ref 8.3–10.1)
CHLORIDE SERPL-SCNC: 100 MMOL/L (ref 100–108)
CO2 SERPL-SCNC: 29 MMOL/L (ref 21–32)
CREAT SERPL-MCNC: 2.19 MG/DL (ref 0.6–1.3)
GFR SERPL CREATININE-BSD FRML MDRD: 31 ML/MIN/1.73SQ M
GLUCOSE SERPL-MCNC: 124 MG/DL (ref 65–140)
POTASSIUM SERPL-SCNC: 4.2 MMOL/L (ref 3.5–5.3)
PROT SERPL-MCNC: 7.1 G/DL (ref 6.4–8.2)
SODIUM SERPL-SCNC: 138 MMOL/L (ref 136–145)

## 2021-05-18 PROCEDURE — 99214 OFFICE O/P EST MOD 30 MIN: CPT | Performed by: NURSE PRACTITIONER

## 2021-05-18 PROCEDURE — 36415 COLL VENOUS BLD VENIPUNCTURE: CPT | Performed by: NURSE PRACTITIONER

## 2021-05-18 PROCEDURE — 80053 COMPREHEN METABOLIC PANEL: CPT | Performed by: NURSE PRACTITIONER

## 2021-05-18 NOTE — PROGRESS NOTES
Assessment/Plan:    1  Chest pain, unspecified type  Resolved  Scheduled for stress test 5/27  Advised to keep appt and follow up with cardiology as scheduled  Pt verbalized understanding  2  KARTHIK (acute kidney injury) ([facility] )  Will recheck labs  Will d/c lisinopril/hctz   Will recheck bp in office in one week  Further plan after review after repeat labs  - Comprehensive metabolic panel  3  Alcoholism ([facility] )  Counseled of risks of excessive etoh consumption  Advised to decrease or cease etoh intake  Anticipatory guidance  Discussed rescources  - Ambulatory referral to Gastroenterology; Future  4  Generalized abdominal pain  - Ambulatory referral to Gastroenterology; Future  Ct scan neg  GI eval    5  Elevated LFTs  Most likely secondary to etoh abuse  Will recheck labs  Counseled on risks of excessive etoh use  - Comprehensive metabolic panel  - Ambulatory referral to Gastroenterology; Future  6  Hypokalemia  Will check labs  - Comprehensive metabolic panel  7  Essential hypertension  Stable  Continue blood pressure medications as ordered  Monitor blood pressure  Bring diary/log of readings to next appointment  Limit salt in diet  Limit etoh  Smoking cessation  8  Atherosclerotic peripheral vascular disease with rest pain (HCC)  Stable  Managed by cardio  Monitor  9  Depression, recurrent (HCC)  Stable  No suicidal ideation  Anticipatory guidance  Monitor  Patient was counseled regarding instructions for management which included: impression/diagnosis, risk/benefits of treatment options, importance of compliance with treatment, risk factor reduction, and prognosis  I have reviewed the instructions with the patient answering all questions and patient verbalized understanding  BMI Counseling: Body mass index is 24 37 kg/m²  BMI is wnl  Counseled on well balanced diet and regular exericse       Subjective:      Patient ID: Sánchez Glez is a 63 y o  male who presents for ED follow up    Here for ED follow u  Seen in ED on 5/15 for chest pain, abd pain  Labs abnormal  Scheduled for stress test on 5/27  Long standing hx of etoh abuse  Reports currently drinking 4-5 drinks per day, down from 11-12  abd pain for past month, feels gassy, no n/v/d, no fever  Feels like has to move bowel movements frequently which is new  No chest pain currently  Sob with any activity  (+) smoker,         The following portions of the patient's history were reviewed and updated as appropriate: allergies, current medications, past family history, past medical history, past social history, past surgical history and problem list     Review of Systems   Constitutional: Positive for fatigue  Respiratory: Positive for shortness of breath (with activity)  Negative for chest tightness  Cardiovascular: Negative for chest pain, palpitations and leg swelling  Gastrointestinal: Positive for abdominal pain  Negative for nausea and vomiting  Neurological: Negative for dizziness and headaches  Psychiatric/Behavioral: Positive for dysphoric mood  Negative for self-injury and suicidal ideas  The patient is nervous/anxious  Objective:  ED records reviewed, 5/15/2021  Presented to ED with chest pain and blurred vision and abd pain  Treated with NTG SL and IVF bolus  covid test neg  CTA dissection protocol chest abdomen pelvis w wo contrast   Final Result by Niko Boss MD (05/15 6533)   1  No evidence of aortic aneurysm or dissection  2   No acute intrathoracic abnormality  3   Hepatic steatosis  4   Status post splenectomy  2 adjacent well-circumscribed solid nodules in the left upper quadrant likely representing splenules  5   Renal cysts  6   Colonic diverticulosis without evidence of acute diverticulitis  7   Prostatomegaly       Acute kidney injury [facility]):   Chest pain:   Diagnosis management comments: EKG NSR no acute ischemia  CTA performed after verbal consent to give contrast without waiting on GFR d/t emergent situation  Neg for aortic dissection  Labs show Cr 2 0  Elevated transaminases likely d/t ETOH, no RUQ tenderness  Recommended hospital observation for serial cardiac enzymes and fluids for KARTHIK - pt refuses as he needs to take care of something at home  Agrees to return as soon as possible for further evaluation  Understands need for f/u of abnormal renal function  Cardiology consult noted, 4/1/21, Dr Marcos Donahue  Exertional shortness of breath-   Long history of smoking- elevated hgb  Likely multifactorial  Echo 2D to evaluate EF/ pulmonary pressures  Exercise treadmill to rule out active ischemia  Blood pressure mildly elevated at rest        Dyslipidemia-  Currently on atorvastatin 40 mg     Alcohol- low albumin cut way back  5 to 6 beers a day     Anxiety/depression     Smoking- patch- hives  We discussed smoking cessation  The increased risk of cardiovascular events with current smoking       /58   Pulse 84   Temp (!) 97 2 °F (36 2 °C) (Temporal)   Resp 20   Ht 5' 9" (1 753 m)   Wt 74 8 kg (165 lb)   SpO2 97%   BMI 24 37 kg/m²          Physical Exam  Vitals signs reviewed  Constitutional:       General: He is not in acute distress  Appearance: Normal appearance  He is not ill-appearing  Neck:      Musculoskeletal: Normal range of motion and neck supple  Vascular: No carotid bruit  Comments: No JVD  Cardiovascular:      Rate and Rhythm: Normal rate and regular rhythm  Pulmonary:      Effort: Pulmonary effort is normal  No respiratory distress  Breath sounds: Normal breath sounds  No wheezing or rales  Musculoskeletal:      Right lower leg: No edema  Left lower leg: No edema  Skin:     General: Skin is warm and dry  Coloration: Skin is not jaundiced or pale  Neurological:      General: No focal deficit present  Mental Status: He is alert and oriented to person, place, and time     Psychiatric:         Mood and Affect: Mood normal          Behavior: Behavior normal          Thought Content:  Thought content normal          Judgment: Judgment normal

## 2021-05-18 NOTE — PATIENT INSTRUCTIONS
Stop Lisinopril/HCTZ  Continue metoprolol  Will check labs  Limit alcohol intake  Try to decrease smoking/# cigarettes daily  Schedule appt with gastroenterology (stomach doctor) regarding abdominal pain  Keep appt with cardiology for stress test

## 2021-05-23 LAB
ATRIAL RATE: 78 BPM
P AXIS: 9 DEGREES
PR INTERVAL: 124 MS
QRS AXIS: 24 DEGREES
QRSD INTERVAL: 90 MS
QT INTERVAL: 400 MS
QTC INTERVAL: 456 MS
T WAVE AXIS: 56 DEGREES
VENTRICULAR RATE: 78 BPM

## 2021-05-23 PROCEDURE — 93010 ELECTROCARDIOGRAM REPORT: CPT | Performed by: INTERNAL MEDICINE

## 2021-05-24 ENCOUNTER — TELEPHONE (OUTPATIENT)
Dept: VASCULAR SURGERY | Facility: CLINIC | Age: 61
End: 2021-05-24

## 2021-05-24 NOTE — TELEPHONE ENCOUNTER
Attempted to contact patient to schedule appointment(s) listed below  Requested patient call [phone] option 3 to schedule appointment(s)  Patient's appointment(s) are past due, expected ASAP      Dopplers  [] Abdominal Aorta Iliac (AOIL)  [] Carotid (CV)   [] Celiac and/or Mesenteric  [] Endovascular Aortic Repair (EVAR)   [] Hemodialysis Access (HD)   [x] Lower Limb Arterial (AKSHAT)  [] Lower Limb Venous Duplex with Reflux (LEVDR)  [] Renal Artery  [] Upper Limb (UEA)    Advanced Imaging   [] CTA abdomen    [] CTA abdomen & pelvis    [] CT abdomen with/ without contrast  [] CT abdomen with contrast  [] CT abdomen without contrast    [] CT abdomen & pelvis with/ without contrast  [] CT abdomen & pelvis with contrast  [] CT abdomen & pelvis without contrast    Office Visit   [] New patient, patient last seen over 3 years ago  [x] Risk factor modification

## 2021-05-25 ENCOUNTER — OFFICE VISIT (OUTPATIENT)
Dept: FAMILY MEDICINE CLINIC | Facility: CLINIC | Age: 61
End: 2021-05-25
Payer: MEDICARE

## 2021-05-25 VITALS
SYSTOLIC BLOOD PRESSURE: 134 MMHG | DIASTOLIC BLOOD PRESSURE: 80 MMHG | RESPIRATION RATE: 20 BRPM | HEIGHT: 69 IN | HEART RATE: 80 BPM | BODY MASS INDEX: 24.59 KG/M2 | OXYGEN SATURATION: 97 % | WEIGHT: 166 LBS | TEMPERATURE: 97.7 F

## 2021-05-25 DIAGNOSIS — F10.20 ALCOHOLISM (HCC): ICD-10-CM

## 2021-05-25 DIAGNOSIS — R79.89 ELEVATED LFTS: ICD-10-CM

## 2021-05-25 DIAGNOSIS — I10 ESSENTIAL HYPERTENSION: ICD-10-CM

## 2021-05-25 DIAGNOSIS — N18.32 STAGE 3B CHRONIC KIDNEY DISEASE (HCC): Primary | ICD-10-CM

## 2021-05-25 PROCEDURE — 99214 OFFICE O/P EST MOD 30 MIN: CPT | Performed by: NURSE PRACTITIONER

## 2021-05-25 NOTE — PROGRESS NOTES
Assessment/Plan:  1  Stage 3b chronic kidney disease (HCC)  Stopped ACE and diuretic  Will refer to nephrology for further eval    - Ambulatory referral to Nephrology; Future  2  Essential hypertension  Stable  Continue metoprolol  Monitor bp  Limit salt  No etoh  3  Alcoholism ([address] )  Counseled in depth  Risks of continued etoh use discussed  Anticipatory guidance  4  Elevated LFTs  Suspect secondary to chronic etoh  Monitor  Await GI input  Patient was counseled regarding instructions for management which included: impression/diagnosis, risk/benefits of treatment options, importance of compliance with treatment, risk factor reduction, and prognosis  I have reviewed the instructions with the patient answering all questions and patient verbalized understanding  Subjective:      Patient ID: Amberly Roca is a 63 y o  male who presents for follow up    Here for follow up  Seen on 5/18 at which time lisinopril/hctz was d/c  Kidney functions have been elevated  Here to review recent lab results  Significant etoh hx  Has not had a beer in one week  Has upcoming echo and stress  Scheduled for eval by GI end of June  No chest pain  Feels exhausted with any physical activity  Sob with exertion  Abdominal pain "a little better"  No n/v/d  The following portions of the patient's history were reviewed and updated as appropriate: allergies, current medications, past family history, past medical history, past social history, past surgical history and problem list     Review of Systems   Constitutional: Positive for fatigue  Negative for unexpected weight change  Respiratory: Positive for shortness of breath  Cardiovascular: Negative for chest pain, palpitations and leg swelling  Gastrointestinal: Negative for abdominal pain, diarrhea, nausea and vomiting  Neurological: Negative for dizziness and headaches  Psychiatric/Behavioral: The patient is nervous/anxious  Objective:  Recent Results (from the past 672 hour(s))   ECG 12 lead    Collection Time: 05/15/21  1:59 PM   Result Value Ref Range    Ventricular Rate 78 BPM    Atrial Rate 78 BPM    IA Interval 124 ms    QRSD Interval 90 ms    QT Interval 400 ms    QTC Interval 456 ms    P Axis 9 degrees    QRS Axis 24 degrees    T Wave Axis 56 degrees   CBC and differential    Collection Time: 05/15/21  2:19 PM   Result Value Ref Range    WBC 9 89 4 31 - 10 16 Thousand/uL    RBC 4 18 3 88 - 5 62 Million/uL    Hemoglobin 14 2 12 0 - 17 0 g/dL    Hematocrit 40 5 36 5 - 49 3 %    MCV 97 82 - 98 fL    MCH 34 0 26 8 - 34 3 pg    MCHC 35 1 31 4 - 37 4 g/dL    RDW 13 3 11 6 - 15 1 %    MPV 10 1 8 9 - 12 7 fL    Platelets 783 198 - 882 Thousands/uL    nRBC 0 /100 WBCs    Neutrophils Relative 52 43 - 75 %    Immat GRANS % 0 0 - 2 %    Lymphocytes Relative 38 14 - 44 %    Monocytes Relative 8 4 - 12 %    Eosinophils Relative 1 0 - 6 %    Basophils Relative 1 0 - 1 %    Neutrophils Absolute 5 16 1 85 - 7 62 Thousands/µL    Immature Grans Absolute 0 01 0 00 - 0 20 Thousand/uL    Lymphocytes Absolute 3 71 0 60 - 4 47 Thousands/µL    Monocytes Absolute 0 81 0 17 - 1 22 Thousand/µL    Eosinophils Absolute 0 11 0 00 - 0 61 Thousand/µL    Basophils Absolute 0 09 0 00 - 0 10 Thousands/µL   Comprehensive metabolic panel    Collection Time: 05/15/21  2:19 PM   Result Value Ref Range    Sodium 138 136 - 145 mmol/L    Potassium 3 1 (L) 3 5 - 5 3 mmol/L    Chloride 99 (L) 100 - 108 mmol/L    CO2 26 21 - 32 mmol/L    ANION GAP 13 4 - 13 mmol/L    BUN 28 (H) 5 - 25 mg/dL    Creatinine 2 02 (H) 0 60 - 1 30 mg/dL    Glucose 118 65 - 140 mg/dL    Calcium 8 5 8 3 - 10 1 mg/dL    Corrected Calcium 9 5 8 3 - 10 1 mg/dL     (H) 5 - 45 U/L    ALT 94 (H) 12 - 78 U/L    Alkaline Phosphatase 164 (H) 46 - 116 U/L    Total Protein 7 4 6 4 - 8 2 g/dL    Albumin 2 8 (L) 3 5 - 5 0 g/dL    Total Bilirubin 1 43 (H) 0 20 - 1 00 mg/dL    eGFR 35 ml/min/1 73sq m Lipase    Collection Time: 05/15/21  2:19 PM   Result Value Ref Range    Lipase 309 73 - 393 u/L   Protime-INR    Collection Time: 05/15/21  2:19 PM   Result Value Ref Range    Protime 11 9 11 6 - 14 5 seconds    INR 0 88 0 84 - 1 19   APTT    Collection Time: 05/15/21  2:19 PM   Result Value Ref Range    PTT 23 23 - 37 seconds   Troponin I    Collection Time: 05/15/21  2:19 PM   Result Value Ref Range    Troponin I <0 02 <=0 04 ng/mL   Novel Coronavirus (Covid-19),PCR [facility] - 2 Hour Stat    Collection Time: 05/15/21  2:19 PM    Specimen: Nose; Nares   Result Value Ref Range    SARS-CoV-2 Negative Negative   Comprehensive metabolic panel    Collection Time: 05/18/21  2:42 PM   Result Value Ref Range    Sodium 138 136 - 145 mmol/L    Potassium 4 2 3 5 - 5 3 mmol/L    Chloride 100 100 - 108 mmol/L    CO2 29 21 - 32 mmol/L    ANION GAP 9 4 - 13 mmol/L    BUN 27 (H) 5 - 25 mg/dL    Creatinine 2 19 (H) 0 60 - 1 30 mg/dL    Glucose 124 65 - 140 mg/dL    Calcium 9 0 8 3 - 10 1 mg/dL    Corrected Calcium 10 0 8 3 - 10 1 mg/dL     (H) 5 - 45 U/L    ALT 87 (H) 12 - 78 U/L    Alkaline Phosphatase 188 (H) 46 - 116 U/L    Total Protein 7 1 6 4 - 8 2 g/dL    Albumin 2 8 (L) 3 5 - 5 0 g/dL    Total Bilirubin 1 05 (H) 0 20 - 1 00 mg/dL    eGFR 31 ml/min/1 73sq m   Reviewed lab/diagnostic results with pt including both normal and abnormal findings  In depth counseling and instructions given  All questions answered during visit  /80   Pulse 80   Temp 97 7 °F (36 5 °C) (Temporal)   Resp 20   Ht 5' 9" (1 753 m)   Wt 75 3 kg (166 lb)   SpO2 97%   BMI 24 51 kg/m²          Physical Exam  Vitals signs reviewed  Constitutional:       General: He is not in acute distress  Appearance: He is not ill-appearing or diaphoretic  Neck:      Musculoskeletal: Normal range of motion and neck supple  Vascular: No carotid bruit        Comments: No JVD  Cardiovascular:      Rate and Rhythm: Normal rate and regular rhythm  Pulmonary:      Effort: Pulmonary effort is normal  No respiratory distress  Breath sounds: Normal breath sounds  No wheezing or rales  Abdominal:      General: There is no distension  Palpations: Abdomen is soft  Musculoskeletal:      Right lower leg: No edema  Left lower leg: No edema  Skin:     General: Skin is warm and dry  Coloration: Skin is not jaundiced or pale  Neurological:      General: No focal deficit present  Mental Status: He is alert and oriented to person, place, and time  Cranial Nerves: No cranial nerve deficit  Sensory: No sensory deficit  Psychiatric:         Mood and Affect: Mood normal          Behavior: Behavior normal          Thought Content:  Thought content normal          Judgment: Judgment normal

## 2021-05-25 NOTE — PATIENT INSTRUCTIONS
Schedule appt with nephrologist (kidney doctor), Dr Savannah Cee, as discussed     Continue all current medications  No alcohol as discussed  Keep appt with cardiology for echo/stress

## 2021-05-27 ENCOUNTER — HOSPITAL ENCOUNTER (OUTPATIENT)
Dept: NON INVASIVE DIAGNOSTICS | Facility: HOSPITAL | Age: 61
Discharge: HOME | End: 2021-05-27
Attending: INTERNAL MEDICINE
Payer: MEDICARE

## 2021-05-27 DIAGNOSIS — I70.229 ATHEROSCLEROTIC PERIPHERAL VASCULAR DISEASE WITH REST PAIN (HCC): ICD-10-CM

## 2021-05-27 DIAGNOSIS — I10 ESSENTIAL HYPERTENSION: ICD-10-CM

## 2021-05-27 DIAGNOSIS — R07.9 CHEST PAIN IN ADULT: ICD-10-CM

## 2021-05-27 DIAGNOSIS — R09.89 BRUIT (ARTERIAL): ICD-10-CM

## 2021-05-27 DIAGNOSIS — M95.4 CHEST WALL DEFORMITY: ICD-10-CM

## 2021-05-27 DIAGNOSIS — E78.2 MIXED HYPERLIPIDEMIA: ICD-10-CM

## 2021-05-27 LAB
CHEST PAIN STATEMENT: NORMAL
MAX DIASTOLIC BP: 84 MMHG
MAX HEART RATE: 150 BPM
MAX PREDICTED HEART RATE: 159 BPM
MAX. SYSTOLIC BP: 156 MMHG
PROTOCOL NAME: NORMAL
TARGET HR FORMULA: NORMAL
TEST INDICATION: NORMAL
TIME IN EXERCISE PHASE: NORMAL

## 2021-05-27 PROCEDURE — 93018 CV STRESS TEST I&R ONLY: CPT | Performed by: INTERNAL MEDICINE

## 2021-05-27 PROCEDURE — 93016 CV STRESS TEST SUPVJ ONLY: CPT | Performed by: INTERNAL MEDICINE

## 2021-05-27 PROCEDURE — 93017 CV STRESS TEST TRACING ONLY: CPT

## 2021-05-27 PROCEDURE — 93306 TTE W/DOPPLER COMPLETE: CPT

## 2021-05-28 PROCEDURE — 93306 TTE W/DOPPLER COMPLETE: CPT | Performed by: INTERNAL MEDICINE

## 2021-06-01 ENCOUNTER — TELEPHONE (OUTPATIENT)
Dept: CARDIOLOGY CLINIC | Facility: CLINIC | Age: 61
End: 2021-06-01

## 2021-06-01 NOTE — TELEPHONE ENCOUNTER
----- Message from Bo Seymour MD sent at 6/1/2021 10:34 AM EDT -----  Patient stress test overall looks normal  Will discuss further on follow-up

## 2021-06-01 NOTE — TELEPHONE ENCOUNTER
----- Message from Sophia Morris MD sent at 6/1/2021 10:35 AM EDT -----  Normal heart function   Will discuss mild abnormallities on follow-up

## 2021-06-08 ENCOUNTER — OFFICE VISIT (OUTPATIENT)
Dept: CARDIOLOGY CLINIC | Facility: CLINIC | Age: 61
End: 2021-06-08
Payer: MEDICARE

## 2021-06-08 VITALS
HEIGHT: 69 IN | HEART RATE: 82 BPM | BODY MASS INDEX: 24.73 KG/M2 | DIASTOLIC BLOOD PRESSURE: 88 MMHG | TEMPERATURE: 97.3 F | SYSTOLIC BLOOD PRESSURE: 154 MMHG | WEIGHT: 167 LBS | OXYGEN SATURATION: 97 %

## 2021-06-08 DIAGNOSIS — I10 ESSENTIAL HYPERTENSION: ICD-10-CM

## 2021-06-08 DIAGNOSIS — R07.9 CHEST PAIN IN ADULT: ICD-10-CM

## 2021-06-08 DIAGNOSIS — I70.229 ATHEROSCLEROTIC PERIPHERAL VASCULAR DISEASE WITH REST PAIN (HCC): ICD-10-CM

## 2021-06-08 DIAGNOSIS — E78.2 MIXED HYPERLIPIDEMIA: Primary | ICD-10-CM

## 2021-06-08 DIAGNOSIS — I73.9 PERIPHERAL VASCULAR DISEASE (HCC): ICD-10-CM

## 2021-06-08 PROCEDURE — 99214 OFFICE O/P EST MOD 30 MIN: CPT | Performed by: INTERNAL MEDICINE

## 2021-06-08 RX ORDER — ATORVASTATIN CALCIUM 40 MG/1
40 TABLET, FILM COATED ORAL DAILY
Qty: 30 TABLET | Refills: 5 | Status: SHIPPED | COMMUNITY
Start: 2021-06-08 | End: 2021-06-22 | Stop reason: ALTCHOICE
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
## 2021-06-09 ENCOUNTER — HOSPITAL ENCOUNTER (OUTPATIENT)
Dept: RADIOLOGY | Facility: HOSPITAL | Age: 61
Discharge: HOME | End: 2021-06-09
Attending: SURGERY
Payer: MEDICARE

## 2021-06-09 DIAGNOSIS — I70.229 ATHEROSCLEROTIC PERIPHERAL VASCULAR DISEASE WITH REST PAIN (HCC): ICD-10-CM

## 2021-06-09 DIAGNOSIS — R09.89 BRUIT (ARTERIAL): ICD-10-CM

## 2021-06-09 PROCEDURE — 93926 LOWER EXTREMITY STUDY: CPT

## 2021-06-09 PROCEDURE — 93880 EXTRACRANIAL BILAT STUDY: CPT

## 2021-06-10 PROCEDURE — 93880 EXTRACRANIAL BILAT STUDY: CPT | Performed by: SURGERY

## 2021-06-11 ENCOUNTER — TELEPHONE (OUTPATIENT)
Dept: CARDIOLOGY CLINIC | Facility: CLINIC | Age: 61
End: 2021-06-11

## 2021-06-11 PROCEDURE — 93926 LOWER EXTREMITY STUDY: CPT | Performed by: SURGERY

## 2021-06-11 PROCEDURE — 93922 UPR/L XTREMITY ART 2 LEVELS: CPT | Performed by: SURGERY

## 2021-06-11 NOTE — TELEPHONE ENCOUNTER
Patient called regarding recent vascular testing results  Can you please advise and forward appropriately?   Thanks

## 2021-06-11 NOTE — TELEPHONE ENCOUNTER
Attempted to call pt- went right to vm, left message for pt to call the office  Per AK[facility] impression, there is progression of disease process and tech noted rest pain and claudication  Pt is scheduled for return OV 7/16 chidi/ [facility] to review studies- would move this up when pt calls back

## 2021-06-14 ENCOUNTER — TELEPHONE (OUTPATIENT)
Dept: FAMILY MEDICINE CLINIC | Facility: CLINIC | Age: 61
End: 2021-06-14

## 2021-06-14 ENCOUNTER — TELEMEDICINE (OUTPATIENT)
Dept: VASCULAR SURGERY | Facility: CLINIC | Age: 61
End: 2021-06-14
Payer: MEDICARE

## 2021-06-14 VITALS — HEIGHT: 69 IN | WEIGHT: 167 LBS | TEMPERATURE: 98.4 F | BODY MASS INDEX: 24.73 KG/M2

## 2021-06-14 DIAGNOSIS — Z72.0 TOBACCO ABUSE: ICD-10-CM

## 2021-06-14 DIAGNOSIS — I10 ESSENTIAL HYPERTENSION: ICD-10-CM

## 2021-06-14 DIAGNOSIS — I65.23 BILATERAL CAROTID ARTERY STENOSIS: ICD-10-CM

## 2021-06-14 DIAGNOSIS — R79.89 ELEVATED LFTS: ICD-10-CM

## 2021-06-14 DIAGNOSIS — I70.213 ATHEROSCLEROSIS OF NATIVE ARTERY OF BOTH LOWER EXTREMITIES WITH INTERMITTENT CLAUDICATION (HCC): Primary | ICD-10-CM

## 2021-06-14 DIAGNOSIS — E78.2 MIXED HYPERLIPIDEMIA: ICD-10-CM

## 2021-06-14 PROCEDURE — 99214 OFFICE O/P EST MOD 30 MIN: CPT | Performed by: PHYSICIAN ASSISTANT

## 2021-06-14 NOTE — TELEPHONE ENCOUNTER
Pt called stating his Cardiologist referred him to psych but was not given a referral  He is requesting a referral

## 2021-06-14 NOTE — PATIENT INSTRUCTIONS
Peripheral arterial disease with claudication      Plan:  -referral to smoking cessation program  -significantly abnormal renal and LFTs; follow up with NATHANIEL Garcia for re-evaluation  -regular, progressive walking program  -continue with aspirin 81  -talk to NATHANIEL Garcia regarding atovastatin therapy  -recommend formal in-person evaluation in 4-6 weeks              Peripheral Artery Disease   AMBULATORY CARE:   Peripheral artery disease (PAD)  is narrow, weak, or blocked arteries  It may affect any arteries outside of your heart and brain  PAD is usually the result of a buildup of fat and cholesterol, also called plaque, along your artery walls  Inflammation, a blood clot, or abnormal cell growth could also block your arteries  PAD prevents normal blood flow to your legs and arms  You are at risk of an amputation if poor blood flow keeps wounds from healing or causes gangrene (tissue death)  Without treatment, PAD can also cause a heart attack or stroke  Common symptoms include:  Mild PAD usually does not cause symptoms  As the disease worsens over time, you may have the following:  · Pain or cramps in your leg or hip while you walk     · A numb, weak, or heavy feeling in your legs     · Dry, scaly, red, or pale skin on your legs     · Thick or brittle nails, or hair loss on your arms and legs     · Foot sores that will not heal     · Burning or aching in your feet and toes while resting (this may be worse when you lie down)    Call 911 for the following:   · You have any of the following signs of a heart attack:      ? Squeezing, pressure, or pain in your chest    ? You may  also have any of the following:     ? Discomfort or pain in your back, neck, jaw, stomach, or arm    ? Shortness of breath    ? Nausea or vomiting    ? Lightheadedness or a sudden cold sweat    · You have any of the following signs of a stroke:      ? Numbness or drooping on one side of your face     ?  Weakness in an arm or leg    ? Confusion or difficulty speaking    ? Dizziness, a severe headache, or vision loss    Seek care immediately if:   · You have sores or wounds that will not heal      · You notice black or discolored skin on your arm or leg  · Your skin is cool to the touch  Contact your healthcare provider if:   · You have leg pain when you walk 1/8 mile (200 meters) or less, even with treatment  · Your legs are red, dry, or pale, even with treatment  · You have questions or concerns about your condition or care  Treatment for PAD  can help reduce your risk of a heart attack, stroke, or amputation  You may need more than one of the following:  · Medicines  may be given to prevent blood clots and reduce the risk of a heart attack or stroke  You may be given medicine to help prevent your PAD from getting worse  · A supervised exercise program  helps you stay active in normal daily activities and may prevent disability  Healthcare providers will help you safely walk or do strength training exercises 3 times a week for 30 to 60 minutes  You will do this for several months, then transition to walking on your own  · Angioplasty  is a procedure to open your artery so blood can flow through normally  A thin tube called a catheter is used to insert a small balloon into your artery  The balloon is inflated to open your blocked artery, and then removed  A tube called a stent may be placed in your artery to hold it open  · Bypass surgery  is used to make a new connection to your artery with a vein from another part of your body, or an artificial graft  The vein or graft is attached to your artery above and below your blockage  This allows blood to flow around the blocked portion of your artery  Manage and prevent PAD:   · Walk for 30 to 60 minutes at least 4 times a week    Your healthcare provider may also refer you to an supervised exercise program  The program helps increase how far you can walk without pain  It also helps you stay active in normal daily activities and may prevent disability caused by PAD  · Do not smoke  Nicotine and other chemicals in cigarettes and cigars can worsen PAD  They can also increase your risk for a heart attack or stroke  Ask your healthcare provider for information if you currently smoke and need help to quit  E-cigarettes or smokeless tobacco still contain nicotine  Talk to your healthcare provider before you use these products  · Manage other health conditions  Take your medicines as directed and follow your healthcare provider's instructions if you have high blood pressure or high cholesterol  Perform foot care and check your blood sugar levels as directed if you have diabetes  · Eat heart healthy foods  Eat whole grains, fruits, and vegetables every day  Limit salt and high-fat foods  Ask your healthcare provider for more information on a heart healthy diet  Ask if you need to lose weight  Your healthcare provider can help you create a healthy weight-loss plan  Follow up with your healthcare provider as directed:  Write down your questions so you remember to ask them during your visits  © Copyright [address] Information is for End User's use only and may not be sold, redistributed or otherwise used for commercial purposes  All illustrations and images included in CareNotes® are the copyrighted property of [employer] M [employer]  or [address] [place], [place] [address]   The above information is an  only  It is not intended as medical advice for individual conditions or treatments  Talk to your doctor, nurse or pharmacist before following any medical regimen to see if it is safe and effective for you
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
## 2021-06-15 ENCOUNTER — TELEPHONE (OUTPATIENT)
Dept: PSYCHIATRY | Facility: CLINIC | Age: 61
End: 2021-06-15

## 2021-06-15 DIAGNOSIS — F41.9 ANXIETY DISORDER, UNSPECIFIED TYPE: ICD-10-CM

## 2021-06-15 DIAGNOSIS — F40.10 SOCIAL ANXIETY DISORDER: ICD-10-CM

## 2021-06-15 DIAGNOSIS — F33.9 DEPRESSION, RECURRENT (HCC): Primary | ICD-10-CM

## 2021-06-15 DIAGNOSIS — F10.20 ALCOHOLISM (HCC): ICD-10-CM

## 2021-06-15 NOTE — TELEPHONE ENCOUNTER
Patient called to set up an apt I told him to contact his PCP to get a referral and then we can forward it to intake

## 2021-06-21 ENCOUNTER — TELEPHONE (OUTPATIENT)
Dept: PSYCHIATRY | Facility: CLINIC | Age: 61
End: 2021-06-21

## 2021-06-22 ENCOUNTER — OFFICE VISIT (OUTPATIENT)
Dept: FAMILY MEDICINE CLINIC | Facility: CLINIC | Age: 61
End: 2021-06-22
Payer: MEDICARE

## 2021-06-22 VITALS
HEIGHT: 69 IN | BODY MASS INDEX: 25.33 KG/M2 | HEART RATE: 76 BPM | RESPIRATION RATE: 20 BRPM | TEMPERATURE: 97.3 F | DIASTOLIC BLOOD PRESSURE: 86 MMHG | SYSTOLIC BLOOD PRESSURE: 142 MMHG | OXYGEN SATURATION: 99 % | WEIGHT: 171 LBS

## 2021-06-22 DIAGNOSIS — I10 ESSENTIAL HYPERTENSION: Primary | ICD-10-CM

## 2021-06-22 DIAGNOSIS — N18.32 STAGE 3B CHRONIC KIDNEY DISEASE (HCC): ICD-10-CM

## 2021-06-22 DIAGNOSIS — F41.9 ANXIETY DISORDER, UNSPECIFIED TYPE: ICD-10-CM

## 2021-06-22 DIAGNOSIS — Z72.0 TOBACCO ABUSE: ICD-10-CM

## 2021-06-22 DIAGNOSIS — R79.89 ELEVATED LFTS: ICD-10-CM

## 2021-06-22 DIAGNOSIS — E78.2 MIXED HYPERLIPIDEMIA: ICD-10-CM

## 2021-06-22 DIAGNOSIS — F10.20 ALCOHOLISM (HCC): ICD-10-CM

## 2021-06-22 DIAGNOSIS — F32.9 MAJOR DEPRESSIVE DISORDER, REMISSION STATUS UNSPECIFIED, UNSPECIFIED WHETHER RECURRENT: ICD-10-CM

## 2021-06-22 DIAGNOSIS — I70.213 ATHEROSCLEROSIS OF NATIVE ARTERY OF BOTH LOWER EXTREMITIES WITH INTERMITTENT CLAUDICATION (HCC): ICD-10-CM

## 2021-06-22 PROCEDURE — 99214 OFFICE O/P EST MOD 30 MIN: CPT | Performed by: NURSE PRACTITIONER

## 2021-06-22 RX ORDER — EZETIMIBE 10 MG/1
10 TABLET ORAL DAILY
Qty: 30 TABLET | Refills: 5 | Status: SHIPPED | OUTPATIENT
Start: 2021-06-22 | End: 2022-02-01 | Stop reason: SDUPTHER

## 2021-06-22 NOTE — PATIENT INSTRUCTIONS
Stop Lipitor (Atorvastatin)  Start Zetia 10mg (for cholesterol)  Increase Metoprolol 25mg to twice daily  Keep appts with all specialists as scheduled

## 2021-06-22 NOTE — PROGRESS NOTES
Assessment/Plan:  1  Essential hypertension  Will increase metoprolol to bid  Diuretic and ACE on hold until seen by nephrology   Limit salt  Monitor bp  - metoprolol tartrate (LOPRESSOR) 25 mg tablet; Take 1 tablet (25 mg total) by mouth every 12 (twelve) hours  Dispense: 60 tablet; Refill: 5  2  Mixed hyperlipidemia  Will d/c lipitor due to etoh abuse and elevated lfts  Will change to zetia as per cardio recommendation  Heart healthy diet  - ezetimibe (ZETIA) 10 mg tablet; Take 1 tablet (10 mg total) by mouth daily  Dispense: 30 tablet; Refill: 5  3  Elevated LFTs  Monitor  Limit etoh  Await GI input  4  [facility]ism ([facility] )  Counseled  Trying to decrease  Anticipatory guidance  Educated on risks  5  Stage 3b chronic kidney disease ([facility] )  Await nephrology input  appt end of July  Monitor  Continue to hold ACE and diuretic at this time  6  Atherosclerosis of native artery of both lower extremities with intermittent claudication (HCC)  Lipid control but will d/c lipitor as noted above  Will start zetia  Monitor  Follow up with vascular  7  Anxiety disorder, unspecified type  Stress management  Activities to divert attention when possible  Conscious breathing techniques as discussed  Coping mechanisms and strategies vary from person to person so try to utilize strategies that you think may work for you (such as meditation, music, etc  )  Consider  counseling as discussed  8  Major depressive disorder, remission status unspecified, unspecified whether recurrent  Stable  Anticipatory guidance  Denies any suicidal ideation  Monitor  Counseling has been recommended  9  Tobacco abuse  Tobacco Cessation Counseling: Tobacco cessation counseling and education was provided  The patient is sincerely urged to quit consumption of tobacco  He is not ready to quit tobacco  The numerous health risks of tobacco consumption were discussed   If he decides to quit, there are a number of helpful adjunctive aids, and he can see me to discuss nicotine replacement therapy, chantix, or bupropion anytime in the future  Has upcoming appt with pulmonary to discuss    Patient was counseled regarding instructions for management which included: impression/diagnosis, risk/benefits of treatment options, importance of compliance with treatment, risk factor reduction, and prognosis  I have reviewed the instructions with the patient answering all questions and patient verbalized understanding  Subjective:      Patient ID: Kevin Klein is a 57 y o  male who presents for follow up    Here for follow up  Seen 5/25/2021 at which time referred to multiple specialists for different issues  Saw cardio who recommended stop lipitor due to elevated lfts and etoh use and start zetia  ACE and diuretics have been on hold due to elevated kidney functions  Has upcoming appt with GI on 6/25  Has appt with nephrology 7/19, pulmonary 7/8  Has only been taking metoprolol once a day, should be twice  Has referral to psych  Waiting for appt  History of etoh abuse, drinking about 5 beers per day  Still smoking      The following portions of the patient's history were reviewed and updated as appropriate: allergies, current medications, past family history, past medical history, past social history, past surgical history and problem list     Review of Systems   Constitutional: Negative for unexpected weight change  Respiratory: Positive for shortness of breath  Endocrine: Negative for cold intolerance, heat intolerance, polydipsia, polyphagia and polyuria  Musculoskeletal: Positive for myalgias (leg pain)  Neurological: Negative for dizziness and headaches  Psychiatric/Behavioral: Positive for dysphoric mood  Negative for self-injury and suicidal ideas  The patient is nervous/anxious  Objective:  Cardiology notes reviewed, 6/8/21, Dr Omi Gonsales  Exertional shortness of breath-   Long history of smoking- elevated hgb    Treadmill stress is with severe deconditioning  Negative for major ischemia        Dyslipidemia-  Given alcohol usage and elevated lft  Recommend stopping atorvastatin if with elevated kidney function  Switch to ezetimibe      Alcohol- low albumin cut way back  5 to 6 beers a day  He is trying to cut back     Anxiety/depression/alcohol- recommend counseling/behavior therapy- will discuss with primary      Severe PAD- atorvastatin + switch to ezetimibe if with elevated kidney function + aspirin 81mg     Smoking- patch- hives  We discussed smoking cessation  The increased risk of cardiovascular events with current smoking    Ref Range & Units 5/18/21  2:42 PM    Sodium 136 - 145 mmol/L 138    Potassium 3 5 - 5 3 mmol/L 4 2    Chloride 100 - 108 mmol/L 100    CO2 21 - 32 mmol/L 29    ANION GAP 4 - 13 mmol/L 9    BUN 5 - 25 mg/dL 27High     Creatinine 0 60 - 1 30 mg/dL 2  19High     Comment: Standardized to IDMS reference method   Glucose 65 - 140 mg/dL 124    Comment: If the patient is fasting, the ADA then defines impaired fasting glucose as > 100 mg/dL and diabetes as > or equal to 123 mg/dL  Specimen collection should occur prior to Sulfasalazine administration due to the potential for falsely depressed results  Specimen collection should occur prior to Sulfapyridine administration due to the potential for falsely elevated results  Calcium 8 3 - 10 1 mg/dL 9 0    Corrected Calcium 8 3 - 10 1 mg/dL 10 0    AST 5 - 45 U/L 129High     Comment: Specimen collection should occur prior to Sulfasalazine administration due to the potential for falsely depressed results  ALT 12 - 78 U/L 87High     Comment: Specimen collection should occur prior to Sulfasalazine and/or Sulfapyridine administration due to the potential for falsely depressed results  Alkaline Phosphatase 46 - 116 U/L 188High     Total Protein 6 4 - 8 2 g/dL 7 1    Albumin 3 5 - 5 0 g/dL 2 8Low     Total Bilirubin 0 20 - 1 00 mg/dL 1  05High     Comment: Use of this assay is not recommended for patients undergoing treatment with eltrombopag due to the potential for falsely elevated results  eGFR ml/min/1 73sq m 31      Reviewed lab/diagnostic results with pt including both normal and abnormal findings  In depth counseling and instructions given  All questions answered during visit  /86   Pulse 76   Temp (!) 97 3 °F (36 3 °C) (Temporal)   Resp 20   Ht 5' 9" (1 753 m)   Wt 77 6 kg (171 lb)   SpO2 99%   BMI 25 25 kg/m²          Physical Exam  Vitals reviewed  Constitutional:       General: He is not in acute distress  Appearance: He is not ill-appearing  Neck:      Vascular: No carotid bruit  Cardiovascular:      Rate and Rhythm: Normal rate and regular rhythm  Pulmonary:      Effort: Pulmonary effort is normal  No respiratory distress  Breath sounds: Normal breath sounds  No wheezing, rhonchi or rales  Abdominal:      General: Bowel sounds are normal  There is no distension  Palpations: Abdomen is soft  Tenderness: There is no abdominal tenderness  There is no guarding  Musculoskeletal:      Cervical back: Normal range of motion and neck supple  Right lower leg: No edema  Left lower leg: No edema  Skin:     General: Skin is warm and dry  Coloration: Skin is not jaundiced or pale  Neurological:      General: No focal deficit present  Mental Status: He is alert and oriented to person, place, and time  Cranial Nerves: No cranial nerve deficit  Sensory: No sensory deficit  Psychiatric:         Mood and Affect: Mood normal          Behavior: Behavior normal          Thought Content:  Thought content normal          Judgment: Judgment normal

## 2021-06-25 ENCOUNTER — OFFICE VISIT (OUTPATIENT)
Dept: GASTROENTEROLOGY | Facility: CLINIC | Age: 61
End: 2021-06-25
Payer: MEDICARE

## 2021-06-25 VITALS
SYSTOLIC BLOOD PRESSURE: 136 MMHG | HEART RATE: 89 BPM | BODY MASS INDEX: 25.33 KG/M2 | HEIGHT: 69 IN | DIASTOLIC BLOOD PRESSURE: 84 MMHG | WEIGHT: 171 LBS | TEMPERATURE: 97.9 F

## 2021-06-25 DIAGNOSIS — R79.89 ELEVATED LFTS: ICD-10-CM

## 2021-06-25 DIAGNOSIS — K62.5 BRBPR (BRIGHT RED BLOOD PER RECTUM): Primary | ICD-10-CM

## 2021-06-25 DIAGNOSIS — N28.89 OTHER SPECIFIED DISORDERS OF KIDNEY AND URETER: ICD-10-CM

## 2021-06-25 DIAGNOSIS — R13.10 DYSPHAGIA, UNSPECIFIED TYPE: ICD-10-CM

## 2021-06-25 DIAGNOSIS — F10.20 ALCOHOLISM (HCC): ICD-10-CM

## 2021-06-25 DIAGNOSIS — Z11.59 ENCOUNTER FOR SCREENING FOR OTHER VIRAL DISEASES: ICD-10-CM

## 2021-06-25 PROCEDURE — 99204 OFFICE O/P NEW MOD 45 MIN: CPT | Performed by: INTERNAL MEDICINE

## 2021-06-25 NOTE — ASSESSMENT & PLAN NOTE
Most likely from hemorrhoids but should rule out colorectal lesions including polyps or malignancy  Patient never had colonoscopy in the past       -Schedule for colonoscopy  -High-fiber diet     -Patient was given instructions about the colonoscopy prep     -Patient was explained about  the risks and benefits of the procedure  Risks including but not limited to bleeding, infection, perforation were explained in detail  Also explained about less than 100% sensitivity with the exam and other alternatives

## 2021-06-25 NOTE — ASSESSMENT & PLAN NOTE
Most likely due to chronic alcoholic liver disease  No evidence of any cirrhosis on lab workup or CT scan      -  Check viral hepatitis panel, iron profile    - advised to stop drinking alcohol completely and monitor liver enzymes
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
## 2021-07-09 ENCOUNTER — HOSPITAL ENCOUNTER (OUTPATIENT)
Dept: RADIOLOGY | Facility: HOSPITAL | Age: 61
Discharge: HOME | End: 2021-07-09
Attending: INTERNAL MEDICINE
Payer: MEDICARE

## 2021-07-09 ENCOUNTER — APPOINTMENT (OUTPATIENT)
Dept: LAB | Facility: HOSPITAL | Age: 61
End: 2021-07-09
Attending: INTERNAL MEDICINE
Payer: MEDICARE

## 2021-07-09 DIAGNOSIS — R79.89 ELEVATED LFTS: ICD-10-CM

## 2021-07-09 DIAGNOSIS — Z11.59 ENCOUNTER FOR SCREENING FOR OTHER VIRAL DISEASES: ICD-10-CM

## 2021-07-09 LAB
ALBUMIN SERPL BCP-MCNC: 3.4 G/DL (ref 3.5–5)
ALP SERPL-CCNC: 85 U/L (ref 46–116)
ALT SERPL W P-5'-P-CCNC: 23 U/L (ref 12–78)
AST SERPL W P-5'-P-CCNC: 21 U/L (ref 5–45)
BILIRUB DIRECT SERPL-MCNC: 0.18 MG/DL (ref 0–0.2)
BILIRUB SERPL-MCNC: 0.63 MG/DL (ref 0.2–1)
FERRITIN SERPL-MCNC: 362 NG/ML (ref 8–388)
HBV CORE AB SER QL: NORMAL
HBV CORE IGM SER QL: NORMAL
HBV SURFACE AG SER QL: NORMAL
HCV AB SER QL: NORMAL
INR PPP: 0.87 (ref 0.84–1.19)
IRON SATN MFR SERPL: 26 %
IRON SERPL-MCNC: 98 UG/DL (ref 65–175)
PROT SERPL-MCNC: 8 G/DL (ref 6.4–8.2)
PROTHROMBIN TIME: 11.8 SECONDS (ref 11.6–14.5)
TIBC SERPL-MCNC: 377 UG/DL (ref 250–450)

## 2021-07-09 PROCEDURE — 86704 HEP B CORE ANTIBODY TOTAL: CPT

## 2021-07-09 PROCEDURE — 86705 HEP B CORE ANTIBODY IGM: CPT

## 2021-07-09 PROCEDURE — 87340 HEPATITIS B SURFACE AG IA: CPT

## 2021-07-09 PROCEDURE — 36415 COLL VENOUS BLD VENIPUNCTURE: CPT

## 2021-07-09 PROCEDURE — 76981 USE PARENCHYMA: CPT

## 2021-07-09 PROCEDURE — 80076 HEPATIC FUNCTION PANEL: CPT

## 2021-07-09 PROCEDURE — 83540 ASSAY OF IRON: CPT

## 2021-07-09 PROCEDURE — 86803 HEPATITIS C AB TEST: CPT

## 2021-07-09 PROCEDURE — 85610 PROTHROMBIN TIME: CPT

## 2021-07-09 PROCEDURE — 83550 IRON BINDING TEST: CPT

## 2021-07-09 PROCEDURE — 82728 ASSAY OF FERRITIN: CPT

## 2021-07-14 ENCOUNTER — TELEPHONE (OUTPATIENT)
Dept: GASTROENTEROLOGY | Facility: AMBULARY SURGERY CENTER | Age: 61
End: 2021-07-14

## 2021-07-14 NOTE — TELEPHONE ENCOUNTER
Spoke with pt- reiterated importance of alcohol cessation, he also reported the same to me that he is drinking less- thanks

## 2021-07-14 NOTE — TELEPHONE ENCOUNTER
----- Message from Juliann Metzger MD sent at 7/9/2021  6:15 PM EDT -----   Liver enzymes came down to normal   Liver enzyme elevation last month was most likely from alcohol use

## 2021-07-14 NOTE — TELEPHONE ENCOUNTER
PT returned call  Informed pt of results, pt verbalized understanding  PT wants to know if all other blood work was ok including iron etc  Also does he need to schedule a follow up apt before his colonoscopy in October? Pt says he is feeling fine but wants to be sure about apt  Please advise    Thank you
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
## 2021-07-16 ENCOUNTER — TELEPHONE (OUTPATIENT)
Dept: GASTROENTEROLOGY | Facility: AMBULARY SURGERY CENTER | Age: 61
End: 2021-07-16

## 2021-07-16 NOTE — TELEPHONE ENCOUNTER
----- Message from Shiva Medina MD sent at 7/15/2021  4:26 PM EDT -----  Ultrasound elastography  normal

## 2021-07-16 NOTE — TELEPHONE ENCOUNTER
Pt aware of results, verbalized understanding  Pt stated if everything was normal there should not be any harm in drinking one cold beer  Advised to continue alcohol cessation per recommendation  Advised to call office with any questions or concerns

## 2021-07-19 ENCOUNTER — CONSULT (OUTPATIENT)
Dept: NEPHROLOGY | Facility: CLINIC | Age: 61
End: 2021-07-19
Payer: MEDICARE

## 2021-07-19 VITALS
DIASTOLIC BLOOD PRESSURE: 78 MMHG | SYSTOLIC BLOOD PRESSURE: 162 MMHG | WEIGHT: 170 LBS | BODY MASS INDEX: 25.18 KG/M2 | HEIGHT: 69 IN

## 2021-07-19 DIAGNOSIS — N18.32 STAGE 3B CHRONIC KIDNEY DISEASE (HCC): ICD-10-CM

## 2021-07-19 DIAGNOSIS — N17.9 ACUTE KIDNEY INJURY (HCC): Primary | ICD-10-CM

## 2021-07-19 DIAGNOSIS — I10 ESSENTIAL HYPERTENSION: ICD-10-CM

## 2021-07-19 PROCEDURE — 99205 OFFICE O/P NEW HI 60 MIN: CPT | Performed by: INTERNAL MEDICINE
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
## 2021-07-19 NOTE — PATIENT INSTRUCTIONS
Please check blood and urine testing now  Please check kidney ultrasound  Avoid Ibuprofen, Aleve, Advil, Motrin, Naproxen  Check blood tests again in 3 months  Follow up in 3 months
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
## 2021-07-28 ENCOUNTER — HOSPITAL ENCOUNTER (OUTPATIENT)
Dept: RADIOLOGY | Facility: HOSPITAL | Age: 61
Discharge: HOME | End: 2021-07-28
Attending: INTERNAL MEDICINE
Payer: MEDICARE

## 2021-07-28 ENCOUNTER — APPOINTMENT (OUTPATIENT)
Dept: LAB | Facility: HOSPITAL | Age: 61
End: 2021-07-28
Attending: INTERNAL MEDICINE
Payer: MEDICARE

## 2021-07-28 ENCOUNTER — TELEPHONE (OUTPATIENT)
Dept: NEPHROLOGY | Facility: CLINIC | Age: 61
End: 2021-07-28

## 2021-07-28 DIAGNOSIS — N17.9 ACUTE KIDNEY INJURY (HCC): ICD-10-CM

## 2021-07-28 LAB
ALBUMIN SERPL BCP-MCNC: 3.2 G/DL (ref 3.5–5)
ANION GAP SERPL CALCULATED.3IONS-SCNC: 8 MMOL/L (ref 4–13)
BACTERIA UR QL AUTO: ABNORMAL /HPF
BILIRUB UR QL STRIP: NEGATIVE
BUN SERPL-MCNC: 12 MG/DL (ref 5–25)
CALCIUM ALBUM COR SERPL-MCNC: 9.1 MG/DL (ref 8.3–10.1)
CALCIUM SERPL-MCNC: 8.5 MG/DL (ref 8.3–10.1)
CHLORIDE SERPL-SCNC: 101 MMOL/L (ref 100–108)
CLARITY UR: CLEAR
CO2 SERPL-SCNC: 29 MMOL/L (ref 21–32)
COLOR UR: YELLOW
CREAT SERPL-MCNC: 1.52 MG/DL (ref 0.6–1.3)
CREAT UR-MCNC: 111 MG/DL
ERYTHROCYTE [DISTWIDTH] IN BLOOD BY AUTOMATED COUNT: 14.6 % (ref 11.6–15.1)
GFR SERPL CREATININE-BSD FRML MDRD: 48 ML/MIN/1.73SQ M
GLUCOSE P FAST SERPL-MCNC: 106 MG/DL (ref 65–99)
GLUCOSE UR STRIP-MCNC: NEGATIVE MG/DL
HCT VFR BLD AUTO: 49.6 % (ref 36.5–49.3)
HGB BLD-MCNC: 16.7 G/DL (ref 12–17)
HGB UR QL STRIP.AUTO: NEGATIVE
KETONES UR STRIP-MCNC: NEGATIVE MG/DL
LEUKOCYTE ESTERASE UR QL STRIP: NEGATIVE
MAGNESIUM SERPL-MCNC: 1.9 MG/DL (ref 1.6–2.6)
MCH RBC QN AUTO: 35.7 PG (ref 26.8–34.3)
MCHC RBC AUTO-ENTMCNC: 33.7 G/DL (ref 31.4–37.4)
MCV RBC AUTO: 106 FL (ref 82–98)
NITRITE UR QL STRIP: NEGATIVE
NON-SQ EPI CELLS URNS QL MICRO: ABNORMAL /HPF
PH UR STRIP.AUTO: 6.5 [PH]
PHOSPHATE SERPL-MCNC: 2.9 MG/DL (ref 2.3–4.1)
PLATELET # BLD AUTO: 303 THOUSANDS/UL (ref 149–390)
PMV BLD AUTO: 9 FL (ref 8.9–12.7)
POTASSIUM SERPL-SCNC: 3.9 MMOL/L (ref 3.5–5.3)
PROT UR STRIP-MCNC: NEGATIVE MG/DL
PROT UR-MCNC: 20 MG/DL
PROT/CREAT UR: 0.18 MG/G{CREAT} (ref 0–0.1)
RBC # BLD AUTO: 4.68 MILLION/UL (ref 3.88–5.62)
RBC #/AREA URNS AUTO: ABNORMAL /HPF
SODIUM SERPL-SCNC: 138 MMOL/L (ref 136–145)
SP GR UR STRIP.AUTO: 1.01 (ref 1–1.03)
UROBILINOGEN UR QL STRIP.AUTO: 1 E.U./DL
WBC # BLD AUTO: 10.14 THOUSAND/UL (ref 4.31–10.16)
WBC #/AREA URNS AUTO: ABNORMAL /HPF

## 2021-07-28 PROCEDURE — 83735 ASSAY OF MAGNESIUM: CPT

## 2021-07-28 PROCEDURE — 82570 ASSAY OF URINE CREATININE: CPT

## 2021-07-28 PROCEDURE — 36415 COLL VENOUS BLD VENIPUNCTURE: CPT

## 2021-07-28 PROCEDURE — 81001 URINALYSIS AUTO W/SCOPE: CPT

## 2021-07-28 PROCEDURE — 85027 COMPLETE CBC AUTOMATED: CPT

## 2021-07-28 PROCEDURE — 80069 RENAL FUNCTION PANEL: CPT

## 2021-07-28 PROCEDURE — 76770 US EXAM ABDO BACK WALL COMP: CPT

## 2021-07-28 PROCEDURE — 84156 ASSAY OF PROTEIN URINE: CPT

## 2021-07-28 NOTE — TELEPHONE ENCOUNTER
----- Message from Luther Munson MD sent at 7/28/2021  3:23 PM EDT -----  Please inform patient that kidney function is better compared to readings in May 2021

## 2021-08-02 ENCOUNTER — TELEPHONE (OUTPATIENT)
Dept: NEPHROLOGY | Facility: CLINIC | Age: 61
End: 2021-08-02

## 2021-08-02 NOTE — TELEPHONE ENCOUNTER
Per Dr Gabriela Fisher, pt advised of US results; no major abnormalities  Bladder wall a little thick but emptying his bladder well         ----- Message from Luisa Dunlap MD sent at 7/30/2021  5:46 PM EDT -----  Please inform patient that renal US showed no major abnormalities  His bladder wall was a little thick but he appeared to be emptying his bladder well

## 2021-08-11 ENCOUNTER — TELEPHONE (OUTPATIENT)
Dept: NEPHROLOGY | Facility: CLINIC | Age: 61
End: 2021-08-11

## 2021-08-11 NOTE — TELEPHONE ENCOUNTER
I called and left message asking patient to call back to schedule October follow up appt w/ Dr Bishop Maldonado in our [place] office

## 2021-09-29 ENCOUNTER — TELEPHONE (OUTPATIENT)
Dept: PREADMISSION TESTING | Facility: HOSPITAL | Age: 61
End: 2021-09-29

## 2022-02-01 ENCOUNTER — HOSPITAL ENCOUNTER (OUTPATIENT)
Dept: RADIOLOGY | Facility: HOSPITAL | Age: 62
Discharge: HOME | End: 2022-02-01
Payer: MEDICARE

## 2022-02-01 ENCOUNTER — OFFICE VISIT (OUTPATIENT)
Dept: FAMILY MEDICINE CLINIC | Facility: CLINIC | Age: 62
End: 2022-02-01
Payer: MEDICARE

## 2022-02-01 VITALS
HEIGHT: 69 IN | BODY MASS INDEX: 26.22 KG/M2 | OXYGEN SATURATION: 96 % | SYSTOLIC BLOOD PRESSURE: 144 MMHG | DIASTOLIC BLOOD PRESSURE: 74 MMHG | WEIGHT: 177 LBS | TEMPERATURE: 97.6 F

## 2022-02-01 DIAGNOSIS — M79.662 PAIN OF LEFT CALF: ICD-10-CM

## 2022-02-01 DIAGNOSIS — F33.9 DEPRESSION, RECURRENT (HCC): ICD-10-CM

## 2022-02-01 DIAGNOSIS — I70.213 ATHEROSCLEROSIS OF NATIVE ARTERY OF BOTH LOWER EXTREMITIES WITH INTERMITTENT CLAUDICATION (HCC): Primary | ICD-10-CM

## 2022-02-01 DIAGNOSIS — M79.662 PAIN IN LEFT LOWER LEG: ICD-10-CM

## 2022-02-01 DIAGNOSIS — Z72.0 TOBACCO ABUSE: ICD-10-CM

## 2022-02-01 DIAGNOSIS — E78.2 MIXED HYPERLIPIDEMIA: ICD-10-CM

## 2022-02-01 DIAGNOSIS — F10.20 ALCOHOLISM (HCC): ICD-10-CM

## 2022-02-01 DIAGNOSIS — I10 PRIMARY HYPERTENSION: ICD-10-CM

## 2022-02-01 DIAGNOSIS — Z00.00 MEDICARE ANNUAL WELLNESS VISIT, SUBSEQUENT: ICD-10-CM

## 2022-02-01 DIAGNOSIS — N18.32 STAGE 3B CHRONIC KIDNEY DISEASE (HCC): ICD-10-CM

## 2022-02-01 DIAGNOSIS — R79.89 ELEVATED LFTS: ICD-10-CM

## 2022-02-01 PROBLEM — K62.5 BRBPR (BRIGHT RED BLOOD PER RECTUM): Status: RESOLVED | Noted: 2021-06-25 | Resolved: 2022-02-01

## 2022-02-01 PROBLEM — N17.9 ACUTE KIDNEY INJURY (HCC): Status: RESOLVED | Noted: 2021-07-19 | Resolved: 2022-02-01

## 2022-02-01 PROBLEM — R13.10 DYSPHAGIA: Status: RESOLVED | Noted: 2021-06-25 | Resolved: 2022-02-01

## 2022-02-01 LAB
ALBUMIN SERPL BCP-MCNC: 3.8 G/DL (ref 3.5–5)
ALP SERPL-CCNC: 73 U/L (ref 46–116)
ALT SERPL W P-5'-P-CCNC: 34 U/L (ref 12–78)
ANION GAP SERPL CALCULATED.3IONS-SCNC: 4 MMOL/L (ref 4–13)
AST SERPL W P-5'-P-CCNC: 35 U/L (ref 5–45)
BASOPHILS # BLD AUTO: 0.14 THOUSANDS/ΜL (ref 0–0.1)
BASOPHILS NFR BLD AUTO: 1 % (ref 0–1)
BILIRUB SERPL-MCNC: 0.89 MG/DL (ref 0.2–1)
BUN SERPL-MCNC: 11 MG/DL (ref 5–25)
CALCIUM SERPL-MCNC: 9.8 MG/DL (ref 8.3–10.1)
CHLORIDE SERPL-SCNC: 101 MMOL/L (ref 100–108)
CHOLEST SERPL-MCNC: 137 MG/DL
CO2 SERPL-SCNC: 29 MMOL/L (ref 21–32)
CREAT SERPL-MCNC: 1.2 MG/DL (ref 0.6–1.3)
EOSINOPHIL # BLD AUTO: 0.58 THOUSAND/ΜL (ref 0–0.61)
EOSINOPHIL NFR BLD AUTO: 5 % (ref 0–6)
ERYTHROCYTE [DISTWIDTH] IN BLOOD BY AUTOMATED COUNT: 13.2 % (ref 11.6–15.1)
GFR SERPL CREATININE-BSD FRML MDRD: 64 ML/MIN/1.73SQ M
GLUCOSE SERPL-MCNC: 98 MG/DL (ref 65–140)
HCT VFR BLD AUTO: 58 % (ref 36.5–49.3)
HDLC SERPL-MCNC: 64 MG/DL
HGB BLD-MCNC: 19.6 G/DL (ref 12–17)
IMM GRANULOCYTES # BLD AUTO: 0.03 THOUSAND/UL (ref 0–0.2)
IMM GRANULOCYTES NFR BLD AUTO: 0 % (ref 0–2)
LDLC SERPL CALC-MCNC: 61 MG/DL (ref 0–100)
LYMPHOCYTES # BLD AUTO: 3.96 THOUSANDS/ΜL (ref 0.6–4.47)
LYMPHOCYTES NFR BLD AUTO: 35 % (ref 14–44)
MCH RBC QN AUTO: 33.9 PG (ref 26.8–34.3)
MCHC RBC AUTO-ENTMCNC: 33.8 G/DL (ref 31.4–37.4)
MCV RBC AUTO: 100 FL (ref 82–98)
MONOCYTES # BLD AUTO: 1.01 THOUSAND/ΜL (ref 0.17–1.22)
MONOCYTES NFR BLD AUTO: 9 % (ref 4–12)
NEUTROPHILS # BLD AUTO: 5.66 THOUSANDS/ΜL (ref 1.85–7.62)
NEUTS SEG NFR BLD AUTO: 50 % (ref 43–75)
NONHDLC SERPL-MCNC: 73 MG/DL
NRBC BLD AUTO-RTO: 0 /100 WBCS
PLATELET # BLD AUTO: 150 THOUSANDS/UL (ref 149–390)
PMV BLD AUTO: 10.2 FL (ref 8.9–12.7)
POTASSIUM SERPL-SCNC: 5 MMOL/L (ref 3.5–5.3)
PROT SERPL-MCNC: 8.9 G/DL (ref 6.4–8.2)
RBC # BLD AUTO: 5.79 MILLION/UL (ref 3.88–5.62)
SODIUM SERPL-SCNC: 134 MMOL/L (ref 136–145)
TRIGL SERPL-MCNC: 58 MG/DL
WBC # BLD AUTO: 11.38 THOUSAND/UL (ref 4.31–10.16)

## 2022-02-01 PROCEDURE — 36415 COLL VENOUS BLD VENIPUNCTURE: CPT | Performed by: NURSE PRACTITIONER

## 2022-02-01 PROCEDURE — 85025 COMPLETE CBC W/AUTO DIFF WBC: CPT | Performed by: NURSE PRACTITIONER

## 2022-02-01 PROCEDURE — 80061 LIPID PANEL: CPT | Performed by: NURSE PRACTITIONER

## 2022-02-01 PROCEDURE — 99214 OFFICE O/P EST MOD 30 MIN: CPT | Performed by: NURSE PRACTITIONER

## 2022-02-01 PROCEDURE — G0438 PPPS, INITIAL VISIT: HCPCS | Performed by: NURSE PRACTITIONER

## 2022-02-01 PROCEDURE — 93971 EXTREMITY STUDY: CPT

## 2022-02-01 PROCEDURE — 80053 COMPREHEN METABOLIC PANEL: CPT | Performed by: NURSE PRACTITIONER

## 2022-02-01 RX ORDER — EZETIMIBE 10 MG/1
10 TABLET ORAL DAILY
Qty: 30 TABLET | Refills: 5 | Status: SHIPPED | OUTPATIENT
Start: 2022-02-01
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
## 2022-02-01 NOTE — PROGRESS NOTES
Assessment/Plan:  1  Atherosclerosis of native artery of both lower extremities with intermittent claudication ([place] [address] )  Significantly impaired circulation  Still smoking  Counseled regarding same  Aspirin, zetia  Follow up with vascular   2  Primary hypertension  Stable  Continue blood pressure medications as ordered  Limit salt in diet  - Comprehensive metabolic panel  3  Stage 3b chronic kidney disease ([place] [address] )  Will check labs  Managed by nephrology  - CBC and differential  - Comprehensive metabolic panel  4  Alcoholism ([place] [address] )  - CBC and differential  Counseled  Discussed risks of effects of etoh in depth  Recommended counseling/AA  Pt not receptive  5  Mixed hyperlipidemia  No statin due to kidney disease  Continue zetia  - Lipid panel  - ezetimibe (ZETIA) 10 mg tablet; Take 1 tablet (10 mg total) by mouth daily  Dispense: 30 tablet; Refill: 5  6  Tobacco abuse  Tobacco Cessation Counseling: Tobacco cessation counseling and education was provided  The patient is sincerely urged to quit consumption of tobacco  He is not ready to quit tobacco  The numerous health risks of tobacco consumption were discussed  If he decides to quit, there are a number of helpful adjunctive aids, and he can see me to discuss nicotine replacement therapy, chantix, or bupropion anytime in the future  7  Depression, recurrent (HCC)  Stable  Recommend counseling  Not suicidal  Anticipatory guidance  Monitor  8  Elevated LFTs  - Comprehensive metabolic panel  9  Pain of left calf  Stat doppler, rule out dvt  - VAS lower limb venous duplex study, complete bilateral; Future  10  Pain in left lower leg  Stat doppler, rule out dvt  - VAS lower limb venous duplex study, complete bilateral; Future    Patient was counseled regarding instructions for management which included: impression/diagnosis, risk/benefits of treatment options, importance of compliance with treatment, risk factor reduction, and prognosis     I have reviewed the instructions with the patient answering all questions and patient verbalized understanding  Subjective:      Patient ID: Moises Pepper is a 57 y o  male who presents for follow up    Here for med refill of zetia  No recent labs  Used to be on lipitor but was d/c by cardiology in June 2021 due to elevated kidney function  zetia started  Needs refill ,  Has been out of zetia for about a month  C/o pain leg, red, swollen, "think it's from not having my cholesterol meds"  Alcoholic  Drinks minimum of 8-9 drinks per day  Still smoking  Has severe pad and has been advised to stop smoking in past    Left leg/calf pain for 6 days  Left calf "swollen and feels hot and is red"  Has not gone to any medical appts since June  Sob chronic, worse last couple of days  The following portions of the patient's history were reviewed and updated as appropriate: allergies, current medications, past family history, past medical history, past social history, past surgical history and problem list     Review of Systems   Constitutional: Negative for fever and unexpected weight change  HENT: Negative for congestion  Respiratory: Positive for shortness of breath  Negative for chest tightness  Cardiovascular: Positive for leg swelling  Negative for chest pain and palpitations  Pain left calf   Gastrointestinal: Negative for abdominal pain, diarrhea, nausea and vomiting  Musculoskeletal: Positive for myalgias  Skin: Positive for color change (toes both feet red/purplish, left lower leg "red")  Negative for wound  Allergic/Immunologic: Negative for immunocompromised state  Neurological: Negative for dizziness and headaches  Psychiatric/Behavioral: Positive for dysphoric mood  Negative for self-injury and suicidal ideas  The patient is nervous/anxious            Objective:      /74   Temp 97 6 °F (36 4 °C) (Temporal)   Ht 5' 8 5" (1 74 m)   Wt 80 3 kg (177 lb)   SpO2 96%   BMI 26 52 kg/m²          Physical Exam  Vitals reviewed  Constitutional:       General: He is in acute distress (visibly uncomfortable, left leg pain)  Appearance: He is not ill-appearing  Neck:      Vascular: No carotid bruit  Cardiovascular:      Rate and Rhythm: Normal rate and regular rhythm  Comments: Left calf pain with mild palpation  Mild edema left calf  Mild erythema left posterior lower leg  Toes discolored, baseline  Palpable but faint pedal and post tibial pulses both lower extremities, baseline  Pulmonary:      Effort: Pulmonary effort is normal  No respiratory distress  Breath sounds: Normal breath sounds  No wheezing or rales  Abdominal:      General: There is no distension  Palpations: Abdomen is soft  Musculoskeletal:         General: Tenderness (left lower leg) present  Cervical back: Normal range of motion  Right lower leg: No edema  Left lower leg: Edema present  Skin:     General: Skin is warm and dry  Findings: Erythema (left lower leg, posteriorly) present  Comments: Toes discolored both feet, purplish/red   Neurological:      Mental Status: He is alert and oriented to person, place, and time  Cranial Nerves: No cranial nerve deficit  Sensory: No sensory deficit  Gait: Gait abnormal (secondary to left lower leg pain)  Psychiatric:         Mood and Affect: Mood normal          Behavior: Behavior normal          Thought Content:  Thought content normal          Judgment: Judgment normal

## 2022-02-01 NOTE — PATIENT INSTRUCTIONS
Medicare Preventive Visit Patient Instructions  Thank you for completing your Welcome to Medicare Visit or Medicare Annual Wellness Visit today  Your next wellness visit will be due in one year (2/2/2023)  The screening/preventive services that you may require over the next 5-10 years are detailed below  Some tests may not apply to you based off risk factors and/or age  Screening tests ordered at today's visit but not completed yet may show as past due  Also, please note that scanned in results may not display below  Preventive Screenings:  Service Recommendations Previous Testing/Comments   Colorectal Cancer Screening  · Colonoscopy    · Fecal Occult Blood Test (FOBT)/Fecal Immunochemical Test (FIT)  · Fecal DNA/Cologuard Test  · Flexible Sigmoidoscopy Age: 54-65 years old   Colonoscopy: every 10 years (May be performed more frequently if at higher risk)  OR  FOBT/FIT: every 1 year  OR  Cologuard: every 3 years  OR  Sigmoidoscopy: every 5 years  Screening may be recommended earlier than age 48 if at higher risk for colorectal cancer  Also, an individualized decision between you and your healthcare provider will decide whether screening between the ages of 74-80 would be appropriate   Colonoscopy: Not on file  FOBT/FIT: Not on file  Cologuard: Not on file  Sigmoidoscopy: Not on file          Prostate Cancer Screening Individualized decision between patient and health care provider in men between ages of 53-78   Medicare will cover every 12 months beginning on the day after your 50th birthday PSA: No results in last 5 years           Hepatitis C Screening Once for adults born between 1945 and 1965  More frequently in patients at high risk for Hepatitis C Hep C Antibody: 11/05/2018    Screening Current   Diabetes Screening 1-2 times per year if you're at risk for diabetes or have pre-diabetes Fasting glucose: 106 mg/dL   A1C: 5 1 %    Screening Current   Cholesterol Screening Once every 5 years if you don't have a lipid disorder  May order more often based on risk factors  Lipid panel: 03/04/2020    Screening Not Indicated  History Lipid Disorder      Other Preventive Screenings Covered by Medicare:  1  Abdominal Aortic Aneurysm (AAA) Screening: covered once if your at risk  You're considered to be at risk if you have a family history of AAA or a male between the age of 73-68 who smoking at least 100 cigarettes in your lifetime  2  Lung Cancer Screening: covers low dose CT scan once per year if you meet all of the following conditions: (1) Age 50-69; (2) No signs or symptoms of lung cancer; (3) Current smoker or have quit smoking within the last 15 years; (4) You have a tobacco smoking history of at least 30 pack years (packs per day x number of years you smoked); (5) You get a written order from a healthcare provider  3  Glaucoma Screening: covered annually if you're considered high risk: (1) You have diabetes OR (2) Family history of glaucoma OR (3)  aged 48 and older OR (3)  American aged 72 and older  3  Osteoporosis Screening: covered every 2 years if you meet one of the following conditions: (1) Have a vertebral abnormality; (2) On glucocorticoid therapy for more than 3 months; (3) Have primary hyperparathyroidism; (4) On osteoporosis medications and need to assess response to drug therapy  5  HIV Screening: covered annually if you're between the age of 12-76  Also covered annually if you are younger than 13 and older than 72 with risk factors for HIV infection  For pregnant patients, it is covered up to 3 times per pregnancy      Immunizations:  Immunization Recommendations   Influenza Vaccine Annual influenza vaccination during flu season is recommended for all persons aged >= 6 months who do not have contraindications   Pneumococcal Vaccine (Prevnar and Pneumovax)  * Prevnar = PCV13  * Pneumovax = PPSV23 Adults 25-60 years old: 1-3 doses may be recommended based on certain risk factors  Adults 72 years old: Prevnar (PCV13) vaccine recommended followed by Pneumovax (PPSV23) vaccine  If already received PPSV23 since turning 65, then PCV13 recommended at least one year after PPSV23 dose  Hepatitis B Vaccine 3 dose series if at intermediate or high risk (ex: diabetes, end stage renal disease, liver disease)   Tetanus (Td) Vaccine - COST NOT COVERED BY MEDICARE PART B Following completion of primary series, a booster dose should be given every 10 years to maintain immunity against tetanus  Td may also be given as tetanus wound prophylaxis  Tdap Vaccine - COST NOT COVERED BY MEDICARE PART B Recommended at least once for all adults  For pregnant patients, recommended with each pregnancy  Shingles Vaccine (Shingrix) - COST NOT COVERED BY MEDICARE PART B  2 shot series recommended in those aged 48 and above     Health Maintenance Due:      Topic Date Due    Colorectal Cancer Screening  Never done    Lung Cancer Screening  05/15/2022    HIV Screening  Completed    Hepatitis C Screening  Completed     Immunizations Due:      Topic Date Due    Meningococcal ACWY Vaccine (1 - Risk start 2-23 months series) Never done    HIB Vaccine (1 of 1 - Risk 1-dose series) Never done    COVID-19 Vaccine (1) Never done    Influenza Vaccine (1) 09/01/2021     Advance Directives   What are advance directives? Advance directives are legal documents that state your wishes and plans for medical care  These plans are made ahead of time in case you lose your ability to make decisions for yourself  Advance directives can apply to any medical decision, such as the treatments you want, and if you want to donate organs  What are the types of advance directives? There are many types of advance directives, and each state has rules about how to use them  You may choose a combination of any of the following:  · Living will: This is a written record of the treatment you want   You can also choose which treatments you do not want, which to limit, and which to stop at a certain time  This includes surgery, medicine, IV fluid, and tube feedings  · Durable power of  for healthcare [facility]): This is a written record that states who you want to make healthcare choices for you when you are unable to make them for yourself  This person, called a proxy, is usually a family member or a friend  You may choose more than 1 proxy  · Do not resuscitate (DNR) order:  A DNR order is used in case your heart stops beating or you stop breathing  It is a request not to have certain forms of treatment, such as CPR  A DNR order may be included in other types of advance directives  · Medical directive: This covers the care that you want if you are in a coma, near death, or unable to make decisions for yourself  You can list the treatments you want for each condition  Treatment may include pain medicine, surgery, blood transfusions, dialysis, IV or tube feedings, and a ventilator (breathing machine)  · Values history: This document has questions about your views, beliefs, and how you feel and think about life  This information can help others choose the care that you would choose  Why are advance directives important? An advance directive helps you control your care  Although spoken wishes may be used, it is better to have your wishes written down  Spoken wishes can be misunderstood, or not followed  Treatments may be given even if you do not want them  An advance directive may make it easier for your family to make difficult choices about your care  Cigarette Smoking and Your Health   Risks to your health if you smoke:  Nicotine and other chemicals found in tobacco damage every cell in your body  Even if you are a light smoker, you have an increased risk for cancer, heart disease, and lung disease  If you are pregnant or have diabetes, smoking increases your risk for complications     Benefits to your health if you stop smoking:   · You decrease respiratory symptoms such as coughing, wheezing, and shortness of breath  · You reduce your risk for cancers of the lung, mouth, throat, kidney, bladder, pancreas, stomach, and cervix  If you already have cancer, you increase the benefits of chemotherapy  You also reduce your risk for cancer returning or a second cancer from developing  · You reduce your risk for heart disease, blood clots, heart attack, and stroke  · You reduce your risk for lung infections, and diseases such as pneumonia, asthma, chronic bronchitis, and emphysema  · Your circulation improves  More oxygen can be delivered to your body  If you have diabetes, you lower your risk for complications, such as kidney, artery, and eye diseases  You also lower your risk for nerve damage  Nerve damage can lead to amputations, poor vision, and blindness  · You improve your body's ability to heal and to fight infections  For more information and support to stop smoking:   · [employer]  Phone: 4- 836 - [phone]  Web Address: [employer]  Weight Management   Why it is important to manage your weight:  Being overweight increases your risk of health conditions such as heart disease, high blood pressure, type 2 diabetes, and certain types of cancer  It can also increase your risk for osteoarthritis, sleep apnea, and other respiratory problems  Aim for a slow, steady weight loss  Even a small amount of weight loss can lower your risk of health problems  How to lose weight safely:  A safe and healthy way to lose weight is to eat fewer calories and get regular exercise  You can lose up about 1 pound a week by decreasing the number of calories you eat by 500 calories each day  Healthy meal plan for weight management:  A healthy meal plan includes a variety of foods, contains fewer calories, and helps you stay healthy  A healthy meal plan includes the following:  · Eat whole-grain foods more often    A healthy meal plan should contain fiber  Fiber is the part of grains, fruits, and vegetables that is not broken down by your body  Whole-grain foods are healthy and provide extra fiber in your diet  Some examples of whole-grain foods are whole-wheat breads and pastas, oatmeal, brown rice, and bulgur  · Eat a variety of vegetables every day  Include dark, leafy greens such as spinach, kale, tom greens, and mustard greens  Eat yellow and orange vegetables such as carrots, sweet potatoes, and winter squash  · Eat a variety of fruits every day  Choose fresh or canned fruit (canned in its own juice or light syrup) instead of juice  Fruit juice has very little or no fiber  · Eat low-fat dairy foods  Drink fat-free (skim) milk or 1% milk  Eat fat-free yogurt and low-fat cottage cheese  Try low-fat cheeses such as mozzarella and other reduced-fat cheeses  · Choose meat and other protein foods that are low in fat  Choose beans or other legumes such as split peas or lentils  Choose fish, skinless poultry (chicken or turkey), or lean cuts of red meat (beef or pork)  Before you cook meat or poultry, cut off any visible fat  · Use less fat and oil  Try baking foods instead of frying them  Add less fat, such as margarine, sour cream, regular salad dressing and mayonnaise to foods  Eat fewer high-fat foods  Some examples of high-fat foods include french fries, doughnuts, ice cream, and cakes  · Eat fewer sweets  Limit foods and drinks that are high in sugar  This includes candy, cookies, regular soda, and sweetened drinks  Exercise:  Exercise at least 30 minutes per day on most days of the week  Some examples of exercise include walking, biking, dancing, and swimming  You can also fit in more physical activity by taking the stairs instead of the elevator or parking farther away from stores  Ask your healthcare provider about the best exercise plan for you        © Copyright [employer] 2018 Information is for End User's use only and may not be sold, redistributed or otherwise used for commercial purposes   All illustrations and images included in CareNotes® are the copyrighted property of [employer]  or [employer]

## 2022-02-02 ENCOUNTER — TELEPHONE (OUTPATIENT)
Dept: VASCULAR SURGERY | Facility: CLINIC | Age: 62
End: 2022-02-02

## 2022-02-02 ENCOUNTER — TELEPHONE (OUTPATIENT)
Dept: FAMILY MEDICINE CLINIC | Facility: CLINIC | Age: 62
End: 2022-02-02

## 2022-02-02 PROCEDURE — 93971 EXTREMITY STUDY: CPT | Performed by: SURGERY

## 2022-02-02 NOTE — TELEPHONE ENCOUNTER
Patient returned call and gave him message from [place]  He said he did ask PCP and was referred to us for pain meds  I explained that we have not seen him yet as far as diagnosis, so we can not give him anything   Patient has doppler tomorrow and appt with [facility] on Friday

## 2022-02-02 NOTE — TELEPHONE ENCOUNTER
Per Talent Flush from [place], PA-C, "I would see if the leg feels better hanging down or with a little heat  Other than that, I think he should ask his family doctor about pain medication " Pt should keep AKSHAT and OV Friday

## 2022-02-02 NOTE — TELEPHONE ENCOUNTER
Patient called because he is having left calf pain and swelling  States his left calf is also red in color  Saw his PCP yesterday, who ordered a stat LEV  Per PCP, there is no DVT, but he must follow up with vascular  Patient states that his calf hurts at rest  When he puts pressure on his leg, during ambulation, the pain is unbearable  Left foot is normal in color, but is always cold, per patient  Offered patient an office visit today  Patient refused, and stated that he can only go to the [place] office  Patient has an AKSHAT scheduled for 2/7/22  Offered appointment to have the AKSHAT done today  Patient refused and stated that he cannot go to have it done today  Patient did make an appointment to have the AKSHAT done tomorrow  Made a follow up appointment to see provider on 2/4/22  Patient also requested to ask provider what she would recommend he take for the calf pain in the meantime

## 2022-02-03 ENCOUNTER — HOSPITAL ENCOUNTER (OUTPATIENT)
Dept: RADIOLOGY | Facility: HOSPITAL | Age: 62
Discharge: HOME | End: 2022-02-03
Payer: MEDICARE

## 2022-02-03 DIAGNOSIS — Z72.0 TOBACCO ABUSE: ICD-10-CM

## 2022-02-03 DIAGNOSIS — I70.213 ATHEROSCLEROSIS OF NATIVE ARTERY OF BOTH LOWER EXTREMITIES WITH INTERMITTENT CLAUDICATION (HCC): ICD-10-CM

## 2022-02-03 PROCEDURE — 93925 LOWER EXTREMITY STUDY: CPT | Performed by: SURGERY

## 2022-02-03 PROCEDURE — 93925 LOWER EXTREMITY STUDY: CPT

## 2022-02-03 PROCEDURE — 93922 UPR/L XTREMITY ART 2 LEVELS: CPT | Performed by: SURGERY

## 2022-02-03 PROCEDURE — 93923 UPR/LXTR ART STDY 3+ LVLS: CPT

## 2022-02-04 ENCOUNTER — TELEPHONE (OUTPATIENT)
Dept: VASCULAR SURGERY | Facility: CLINIC | Age: 62
End: 2022-02-04

## 2022-02-04 ENCOUNTER — TELEMEDICINE (OUTPATIENT)
Dept: VASCULAR SURGERY | Facility: CLINIC | Age: 62
End: 2022-02-04
Payer: MEDICARE

## 2022-02-04 VITALS — HEIGHT: 69 IN | BODY MASS INDEX: 26.22 KG/M2 | WEIGHT: 177 LBS

## 2022-02-04 DIAGNOSIS — I70.213 ATHEROSCLEROSIS OF NATIVE ARTERY OF BOTH LOWER EXTREMITIES WITH INTERMITTENT CLAUDICATION (HCC): Primary | ICD-10-CM

## 2022-02-04 DIAGNOSIS — N18.32 STAGE 3B CHRONIC KIDNEY DISEASE (HCC): ICD-10-CM

## 2022-02-04 DIAGNOSIS — Z72.0 TOBACCO ABUSE: ICD-10-CM

## 2022-02-04 DIAGNOSIS — R79.89 ELEVATED LFTS: ICD-10-CM

## 2022-02-04 DIAGNOSIS — I65.23 BILATERAL CAROTID ARTERY STENOSIS: ICD-10-CM

## 2022-02-04 DIAGNOSIS — E78.2 MIXED HYPERLIPIDEMIA: ICD-10-CM

## 2022-02-04 DIAGNOSIS — I10 PRIMARY HYPERTENSION: ICD-10-CM

## 2022-02-04 PROCEDURE — 99213 OFFICE O/P EST LOW 20 MIN: CPT | Performed by: PHYSICIAN ASSISTANT

## 2022-02-04 NOTE — TELEPHONE ENCOUNTER
Offered 2/18/22 apt, pt wants to see PCP prior to coming back to our office  Gave [phone] for pt to call back and schedule follow up visit
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
## 2022-02-04 NOTE — PATIENT INSTRUCTIONS
L calf pain, redness, heat, swelling  -can't stand on foot; increased pain on dorsiflexion  -sx began suddenly 8 days ago  -unable to bare weight or easily walk  -negative DVT     Plan:   -No DVT   -Call PCP as she saw you in office earlier this week ?cellultis       Peripheral arterial disease  -no claudication  -no increased sx prior to L calf pain one week ago  -continue with aspirin  -still smoking 3/4 pack per day     -smoking cessation require   -continue with aspirin   -unable to take statin due to abnormal LFT   -now that labs, LFTs and renal function, are improved, consider resuming statin therapy (cardiology)   -recommend in-person office visit in the next 1-2 weeks

## 2022-02-14 NOTE — TELEPHONE ENCOUNTER
Esperanza Layton called back and requested pain medication  I advised that Jose De Jesus Corley has directed him to schedule an appointment with Vascular in order to assess him further for the pain he is experiencing  He was adamant that Bridget Lizarraga order a pain medication  I again reiterated the importance of contacting vascular to schedule an appointment as soon as possible  He was not happy and hung up      Delorise Larger detailed exam

## 2022-04-21 DIAGNOSIS — I10 ESSENTIAL HYPERTENSION: ICD-10-CM

## 2022-04-21 RX ORDER — AMLODIPINE BESYLATE 5 MG/1
TABLET ORAL
COMMUNITY
Start: 2022-04-19

## 2022-04-22 ENCOUNTER — OFFICE VISIT (OUTPATIENT)
Dept: VASCULAR SURGERY | Facility: CLINIC | Age: 62
End: 2022-04-22
Payer: MEDICARE

## 2022-04-22 VITALS
SYSTOLIC BLOOD PRESSURE: 138 MMHG | TEMPERATURE: 98.6 F | HEIGHT: 69 IN | HEART RATE: 60 BPM | WEIGHT: 173 LBS | BODY MASS INDEX: 25.62 KG/M2 | DIASTOLIC BLOOD PRESSURE: 90 MMHG

## 2022-04-22 DIAGNOSIS — N18.32 STAGE 3B CHRONIC KIDNEY DISEASE (HCC): ICD-10-CM

## 2022-04-22 DIAGNOSIS — I70.213 ATHEROSCLEROSIS OF NATIVE ARTERY OF BOTH LOWER EXTREMITIES WITH INTERMITTENT CLAUDICATION (HCC): ICD-10-CM

## 2022-04-22 DIAGNOSIS — Z72.0 TOBACCO ABUSE: ICD-10-CM

## 2022-04-22 DIAGNOSIS — M79.662 BILATERAL CALF PAIN: Primary | ICD-10-CM

## 2022-04-22 DIAGNOSIS — M79.661 BILATERAL CALF PAIN: Primary | ICD-10-CM

## 2022-04-22 DIAGNOSIS — I65.23 BILATERAL CAROTID ARTERY STENOSIS: ICD-10-CM

## 2022-04-22 PROCEDURE — 99214 OFFICE O/P EST MOD 30 MIN: CPT | Performed by: PHYSICIAN ASSISTANT
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
## 2022-04-22 NOTE — PATIENT INSTRUCTIONS
Atherosclerosis of native artery of both lower extremities with intermittent claudication [facility])  -     Ambulatory Referral to Orthopedic Surgery; Future  -     VAS ASHLEE with exercise study; Future    -referred to orthopedics regarding leg pain while weight-bearing  -work on smoking cessation  -continue with aspirin  -??? Atorvastatin  -check exercise ASHLEE  -follow-up office visit after orthopedic evaluation and exercise ASHLEE for further recommendations      Bilateral calf /heel pain  -     Ambulatory Referral to Orthopedic Surgery;  Future

## 2022-04-25 ENCOUNTER — TELEPHONE (OUTPATIENT)
Dept: OBGYN CLINIC | Facility: HOSPITAL | Age: 62
End: 2022-04-25

## 2022-04-25 NOTE — TELEPHONE ENCOUNTER
Patient was calling to schedule and appointment for calf pain   Patients phone was cutting in and out and couldn't hear him, patient disconnected

## 2022-05-04 ENCOUNTER — HOSPITAL ENCOUNTER (OUTPATIENT)
Dept: RADIOLOGY | Facility: HOSPITAL | Age: 62
Discharge: HOME | End: 2022-05-04
Payer: MEDICARE

## 2022-05-04 DIAGNOSIS — I70.213 ATHEROSCLEROSIS OF NATIVE ARTERY OF BOTH LOWER EXTREMITIES WITH INTERMITTENT CLAUDICATION (HCC): ICD-10-CM

## 2022-05-04 PROCEDURE — 93924 LWR XTR VASC STDY BILAT: CPT

## 2022-05-04 PROCEDURE — 93924 LWR XTR VASC STDY BILAT: CPT | Performed by: SURGERY

## 2022-05-06 ENCOUNTER — TELEPHONE (OUTPATIENT)
Dept: VASCULAR SURGERY | Facility: CLINIC | Age: 62
End: 2022-05-06

## 2022-05-06 NOTE — TELEPHONE ENCOUNTER
Patient is due for a follow up after his ortho appt on 5/10/22 to rev ASHLEE from 5/4/22    Called patient to schedule - left message

## 2022-05-06 NOTE — TELEPHONE ENCOUNTER
----- Message from Nelson Maier sent at 5/5/2022  9:15 AM EDT -----  Call patient for OV with Maci Alcala

## 2022-05-11 ENCOUNTER — OFFICE VISIT (OUTPATIENT)
Dept: OBGYN CLINIC | Facility: CLINIC | Age: 62
End: 2022-05-11
Payer: MEDICARE

## 2022-05-11 VITALS
BODY MASS INDEX: 25.62 KG/M2 | HEIGHT: 69 IN | SYSTOLIC BLOOD PRESSURE: 217 MMHG | WEIGHT: 173 LBS | HEART RATE: 80 BPM | DIASTOLIC BLOOD PRESSURE: 103 MMHG

## 2022-05-11 DIAGNOSIS — M79.661 BILATERAL CALF PAIN: ICD-10-CM

## 2022-05-11 DIAGNOSIS — M79.662 BILATERAL CALF PAIN: ICD-10-CM

## 2022-05-11 PROCEDURE — 99204 OFFICE O/P NEW MOD 45 MIN: CPT | Performed by: ORTHOPAEDIC SURGERY

## 2022-05-11 RX ORDER — CILOSTAZOL 50 MG/1
TABLET ORAL
COMMUNITY
Start: 2022-04-26
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
## 2022-05-12 ENCOUNTER — HOSPITAL ENCOUNTER (EMERGENCY)
Facility: HOSPITAL | Age: 62
Discharge: HOME | End: 2022-05-12
Attending: EMERGENCY MEDICINE
Payer: MEDICARE

## 2022-05-12 ENCOUNTER — APPOINTMENT (EMERGENCY)
Dept: RADIOLOGY | Facility: HOSPITAL | Age: 62
End: 2022-05-12
Payer: MEDICARE

## 2022-05-12 VITALS
TEMPERATURE: 98.2 F | HEART RATE: 70 BPM | WEIGHT: 176.6 LBS | OXYGEN SATURATION: 96 % | SYSTOLIC BLOOD PRESSURE: 182 MMHG | RESPIRATION RATE: 22 BRPM | BODY MASS INDEX: 26.46 KG/M2 | DIASTOLIC BLOOD PRESSURE: 98 MMHG

## 2022-05-12 DIAGNOSIS — R07.9 CHEST PAIN, UNSPECIFIED: Primary | ICD-10-CM

## 2022-05-12 DIAGNOSIS — K63.9 COLON WALL THICKENING: ICD-10-CM

## 2022-05-12 DIAGNOSIS — K57.90 DIVERTICULOSIS: ICD-10-CM

## 2022-05-12 LAB
2HR DELTA HS TROPONIN: -2 NG/L
ALBUMIN SERPL BCP-MCNC: 3.4 G/DL (ref 3.5–5)
ALP SERPL-CCNC: 73 U/L (ref 46–116)
ALT SERPL W P-5'-P-CCNC: 31 U/L (ref 12–78)
ANION GAP SERPL CALCULATED.3IONS-SCNC: 11 MMOL/L (ref 4–13)
AST SERPL W P-5'-P-CCNC: 34 U/L (ref 5–45)
ATRIAL RATE: 72 BPM
BASOPHILS # BLD AUTO: 0.16 THOUSANDS/ΜL (ref 0–0.1)
BASOPHILS NFR BLD AUTO: 2 % (ref 0–1)
BILIRUB SERPL-MCNC: 0.86 MG/DL (ref 0.2–1)
BUN SERPL-MCNC: 8 MG/DL (ref 5–25)
CALCIUM ALBUM COR SERPL-MCNC: 9.2 MG/DL (ref 8.3–10.1)
CALCIUM SERPL-MCNC: 8.7 MG/DL (ref 8.3–10.1)
CARDIAC TROPONIN I PNL SERPL HS: 10 NG/L
CARDIAC TROPONIN I PNL SERPL HS: 8 NG/L
CHLORIDE SERPL-SCNC: 100 MMOL/L (ref 100–108)
CO2 SERPL-SCNC: 26 MMOL/L (ref 21–32)
CREAT SERPL-MCNC: 1.1 MG/DL (ref 0.6–1.3)
EOSINOPHIL # BLD AUTO: 0.67 THOUSAND/ΜL (ref 0–0.61)
EOSINOPHIL NFR BLD AUTO: 7 % (ref 0–6)
ERYTHROCYTE [DISTWIDTH] IN BLOOD BY AUTOMATED COUNT: 13.2 % (ref 11.6–15.1)
GFR SERPL CREATININE-BSD FRML MDRD: 71 ML/MIN/1.73SQ M
GLUCOSE SERPL-MCNC: 101 MG/DL (ref 65–140)
HCT VFR BLD AUTO: 52.3 % (ref 36.5–49.3)
HGB BLD-MCNC: 18.3 G/DL (ref 12–17)
IMM GRANULOCYTES # BLD AUTO: 0.03 THOUSAND/UL (ref 0–0.2)
IMM GRANULOCYTES NFR BLD AUTO: 0 % (ref 0–2)
LIPASE SERPL-CCNC: 170 U/L (ref 73–393)
LYMPHOCYTES # BLD AUTO: 3.35 THOUSANDS/ΜL (ref 0.6–4.47)
LYMPHOCYTES NFR BLD AUTO: 33 % (ref 14–44)
MAGNESIUM SERPL-MCNC: 2 MG/DL (ref 1.6–2.6)
MCH RBC QN AUTO: 34.8 PG (ref 26.8–34.3)
MCHC RBC AUTO-ENTMCNC: 35 G/DL (ref 31.4–37.4)
MCV RBC AUTO: 99 FL (ref 82–98)
MONOCYTES # BLD AUTO: 0.94 THOUSAND/ΜL (ref 0.17–1.22)
MONOCYTES NFR BLD AUTO: 9 % (ref 4–12)
NEUTROPHILS # BLD AUTO: 5.16 THOUSANDS/ΜL (ref 1.85–7.62)
NEUTS SEG NFR BLD AUTO: 49 % (ref 43–75)
NRBC BLD AUTO-RTO: 0 /100 WBCS
NT-PROBNP SERPL-MCNC: 382 PG/ML
P AXIS: 61 DEGREES
PLATELET # BLD AUTO: 207 THOUSANDS/UL (ref 149–390)
PMV BLD AUTO: 10.9 FL (ref 8.9–12.7)
POTASSIUM SERPL-SCNC: 4 MMOL/L (ref 3.5–5.3)
PR INTERVAL: 152 MS
PROT SERPL-MCNC: 8 G/DL (ref 6.4–8.2)
QRS AXIS: 16 DEGREES
QRSD INTERVAL: 88 MS
QT INTERVAL: 392 MS
QTC INTERVAL: 429 MS
RBC # BLD AUTO: 5.26 MILLION/UL (ref 3.88–5.62)
SODIUM SERPL-SCNC: 137 MMOL/L (ref 136–145)
T WAVE AXIS: 42 DEGREES
VENTRICULAR RATE: 72 BPM
WBC # BLD AUTO: 10.31 THOUSAND/UL (ref 4.31–10.16)

## 2022-05-12 PROCEDURE — 80053 COMPREHEN METABOLIC PANEL: CPT | Performed by: EMERGENCY MEDICINE

## 2022-05-12 PROCEDURE — 83880 ASSAY OF NATRIURETIC PEPTIDE: CPT | Performed by: EMERGENCY MEDICINE

## 2022-05-12 PROCEDURE — 93010 ELECTROCARDIOGRAM REPORT: CPT | Performed by: INTERNAL MEDICINE

## 2022-05-12 PROCEDURE — 74176 CT ABD & PELVIS W/O CONTRAST: CPT

## 2022-05-12 PROCEDURE — 84484 ASSAY OF TROPONIN QUANT: CPT | Performed by: EMERGENCY MEDICINE

## 2022-05-12 PROCEDURE — 36415 COLL VENOUS BLD VENIPUNCTURE: CPT | Performed by: EMERGENCY MEDICINE

## 2022-05-12 PROCEDURE — 99285 EMERGENCY DEPT VISIT HI MDM: CPT | Performed by: EMERGENCY MEDICINE

## 2022-05-12 PROCEDURE — 99285 EMERGENCY DEPT VISIT HI MDM: CPT

## 2022-05-12 PROCEDURE — 85025 COMPLETE CBC W/AUTO DIFF WBC: CPT | Performed by: EMERGENCY MEDICINE

## 2022-05-12 PROCEDURE — 83690 ASSAY OF LIPASE: CPT | Performed by: EMERGENCY MEDICINE

## 2022-05-12 PROCEDURE — 83735 ASSAY OF MAGNESIUM: CPT | Performed by: EMERGENCY MEDICINE

## 2022-05-12 PROCEDURE — 71045 X-RAY EXAM CHEST 1 VIEW: CPT

## 2022-05-12 PROCEDURE — G1004 CDSM NDSC: HCPCS

## 2022-05-12 PROCEDURE — 93005 ELECTROCARDIOGRAM TRACING: CPT

## 2022-05-12 NOTE — DISCHARGE INSTRUCTIONS
Return to the ER for further concerns or worsening symptoms  Follow up with your primary care physician, gastroenterology and cardiology in 1-2 days
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
## 2022-06-10 ENCOUNTER — OFFICE VISIT (OUTPATIENT)
Dept: VASCULAR SURGERY | Facility: CLINIC | Age: 62
End: 2022-06-10
Payer: MEDICARE

## 2022-06-10 ENCOUNTER — TELEPHONE (OUTPATIENT)
Dept: VASCULAR SURGERY | Facility: CLINIC | Age: 62
End: 2022-06-10

## 2022-06-10 ENCOUNTER — HOSPITAL ENCOUNTER (OUTPATIENT)
Dept: RADIOLOGY | Facility: HOSPITAL | Age: 62
Discharge: HOME | End: 2022-06-10
Payer: MEDICARE

## 2022-06-10 VITALS
SYSTOLIC BLOOD PRESSURE: 150 MMHG | WEIGHT: 176.6 LBS | DIASTOLIC BLOOD PRESSURE: 96 MMHG | BODY MASS INDEX: 26.46 KG/M2 | HEART RATE: 89 BPM

## 2022-06-10 DIAGNOSIS — I65.23 BILATERAL CAROTID ARTERY STENOSIS: ICD-10-CM

## 2022-06-10 DIAGNOSIS — M79.662 BILATERAL CALF PAIN: ICD-10-CM

## 2022-06-10 DIAGNOSIS — I70.213 ATHEROSCLEROSIS OF NATIVE ARTERY OF BOTH LOWER EXTREMITIES WITH INTERMITTENT CLAUDICATION (HCC): Primary | ICD-10-CM

## 2022-06-10 DIAGNOSIS — Z72.0 TOBACCO ABUSE: ICD-10-CM

## 2022-06-10 DIAGNOSIS — M79.661 BILATERAL CALF PAIN: ICD-10-CM

## 2022-06-10 DIAGNOSIS — I10 PRIMARY HYPERTENSION: ICD-10-CM

## 2022-06-10 DIAGNOSIS — N18.32 STAGE 3B CHRONIC KIDNEY DISEASE (HCC): ICD-10-CM

## 2022-06-10 PROCEDURE — 93970 EXTREMITY STUDY: CPT | Performed by: SURGERY

## 2022-06-10 PROCEDURE — 93970 EXTREMITY STUDY: CPT

## 2022-06-10 PROCEDURE — 99214 OFFICE O/P EST MOD 30 MIN: CPT | Performed by: PHYSICIAN ASSISTANT

## 2022-06-10 NOTE — TELEPHONE ENCOUNTER
This is a reminder; patient is due for follow-up with [facility] per RD to review LEV STAT   Please call patient and schedule the following by the dates provided  Patient's appointment(s) are due on or after 06/10/22      Dopplers  [] Abdominal Aorta Iliac (AOIL)  [] Carotid (CV)   [] Celiac and/or Mesenteric  [] Endovascular Aortic Repair (EVAR)   [] Hemodialysis Access (HD)   [] Lower Limb Arterial (AKSHAT)  [x] Lower Limb Venous Duplex STAT [facility] 06/10/22   [] Renal Artery  [] Upper Limb Arterial (UEA)    [] Upper Limb Venous (UEV)    Advanced Imaging   [] CTA abdomen    [] CTA abdomen & pelvis    [] CT abdomen with/ without contrast  [] CT abdomen with contrast  [] CT abdomen without contrast    [] CT abdomen & pelvis with/ without contrast  [] CT abdomen & pelvis with contrast  [] CT abdomen & pelvis without contrast    Office Visit   [] New patient, patient last seen over 3 years ago  [] Risk factor modification (RFM)   [x] Follow up with Dr Candida Valderrama per Dr Beulah Rodriguez after STAT LEV 06/10/22
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
## 2022-06-10 NOTE — PATIENT INSTRUCTIONS
Bilateral calf pain  -     VAS lower limb venous duplex study, complete bilateral; Future    -6 days of increased calf pain and edema  -stat venous duplex or ED  -needs to see PCP right away  -go to the emergency department for any chest pain or shortness of breath        Atherosclerosis of the lower extremities  -working up acute left calf pain  -continue with aspirin  -smoking cessation is critically important  -follow-up with Dr Bernadine Hurely

## 2022-06-13 ENCOUNTER — TELEPHONE (OUTPATIENT)
Dept: VASCULAR SURGERY | Facility: CLINIC | Age: 62
End: 2022-06-13

## 2022-06-13 NOTE — TELEPHONE ENCOUNTER
Anastasia Alfonso,  asked me to call patient on behalf of office to set up an additional appt with Dr Jeni Bustillos  According to patient, Sandra CROCKER called him later Friday nite and he was drinking beers and she  Refused to give him pain meds because of the alcohol consumption  I offered patient to see dr Jeni Bustillos tomorrow morning  Patient accepted the offer and seem satisfied

## 2022-06-14 ENCOUNTER — OFFICE VISIT (OUTPATIENT)
Dept: VASCULAR SURGERY | Facility: CLINIC | Age: 62
End: 2022-06-14
Payer: MEDICARE

## 2022-06-14 VITALS
DIASTOLIC BLOOD PRESSURE: 88 MMHG | BODY MASS INDEX: 26.1 KG/M2 | WEIGHT: 176.2 LBS | HEART RATE: 87 BPM | SYSTOLIC BLOOD PRESSURE: 152 MMHG | HEIGHT: 69 IN

## 2022-06-14 DIAGNOSIS — M06.9 RHEUMATOID ARTHRITIS INVOLVING BOTH FEET, UNSPECIFIED WHETHER RHEUMATOID FACTOR PRESENT (HCC): Primary | ICD-10-CM

## 2022-06-14 PROCEDURE — 99214 OFFICE O/P EST MOD 30 MIN: CPT | Performed by: SURGERY

## 2022-06-14 NOTE — LETTER
June 14, 2022     Yunior Jeffrey, [address] [facility]    Patient: Jeff Bryant   YOB: 1960   Date of Visit: 6/14/2022       Dear Dr Shellie Stringer: Thank you for referring Kimo Lynn to me for evaluation  Below are my notes for this consultation  If you have questions, please do not hesitate to call me  I look forward to following your patient along with you           Sincerely,        Rita Abraham MD        CC: Gary August MD

## 2022-06-14 NOTE — ASSESSMENT & PLAN NOTE
Presents with swelling and pain in his left lower extremity has discomfort with ambulation has to walk on his tip toes  He has also had this in his right leg in the past and does seem to be migratory symptoms  He has easily palpable pedal pulse on the right however does have peripheral arterial disease in the left but does not appear to be limb threatening  There is also modest swelling in the left lower extremity possibly indicative of compartment syndrome however he has had migratory symptoms in the past affecting both legs intermittently  I do not feel that his peripheral arterial disease is significant and treatment of it in more all likelihood would not change his symptoms  Plan:  I am requesting a rheumatology consult as this might be some type of autoimmune verses potential rheumatoid arthritis    Will await evaluation by Rheumatology for further planning if necessary
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
## 2022-07-22 ENCOUNTER — APPOINTMENT (OUTPATIENT)
Dept: LAB | Facility: CLINIC | Age: 62
End: 2022-07-22
Payer: MEDICARE

## 2022-07-22 ENCOUNTER — APPOINTMENT (OUTPATIENT)
Dept: RADIOLOGY | Facility: CLINIC | Age: 62
End: 2022-07-22
Payer: MEDICARE

## 2022-07-22 DIAGNOSIS — R22.42 MASS OF LOWER LEG, LEFT: ICD-10-CM

## 2022-07-22 DIAGNOSIS — M25.572 LEFT ANKLE PAIN, UNSPECIFIED CHRONICITY: ICD-10-CM

## 2022-07-22 LAB
ALBUMIN SERPL BCP-MCNC: 3.3 G/DL (ref 3.5–5)
ALP SERPL-CCNC: 80 U/L (ref 46–116)
ALT SERPL W P-5'-P-CCNC: 37 U/L (ref 12–78)
AST SERPL W P-5'-P-CCNC: 53 U/L (ref 5–45)
BILIRUB DIRECT SERPL-MCNC: 0.21 MG/DL (ref 0–0.2)
BILIRUB SERPL-MCNC: 0.61 MG/DL (ref 0.2–1)
CRP SERPL QL: 13 MG/L
ERYTHROCYTE [SEDIMENTATION RATE] IN BLOOD: 38 MM/HOUR (ref 0–19)
GGT SERPL-CCNC: 153 U/L (ref 5–85)
PROT SERPL-MCNC: 8.1 G/DL (ref 6.4–8.4)
PTH-INTACT SERPL-MCNC: 57.1 PG/ML (ref 18.4–80.1)
URATE SERPL-MCNC: 5.6 MG/DL (ref 3.5–8.5)
VIT B12 SERPL-MCNC: 385 PG/ML (ref 100–900)

## 2022-07-22 PROCEDURE — 85652 RBC SED RATE AUTOMATED: CPT

## 2022-07-22 PROCEDURE — 83970 ASSAY OF PARATHORMONE: CPT

## 2022-07-22 PROCEDURE — 82977 ASSAY OF GGT: CPT

## 2022-07-22 PROCEDURE — 36415 COLL VENOUS BLD VENIPUNCTURE: CPT

## 2022-07-22 PROCEDURE — 80076 HEPATIC FUNCTION PANEL: CPT

## 2022-07-22 PROCEDURE — 86038 ANTINUCLEAR ANTIBODIES: CPT

## 2022-07-22 PROCEDURE — 86235 NUCLEAR ANTIGEN ANTIBODY: CPT

## 2022-07-22 PROCEDURE — 86039 ANTINUCLEAR ANTIBODIES (ANA): CPT

## 2022-07-22 PROCEDURE — 86140 C-REACTIVE PROTEIN: CPT

## 2022-07-22 PROCEDURE — 82164 ANGIOTENSIN I ENZYME TEST: CPT

## 2022-07-22 PROCEDURE — 86803 HEPATITIS C AB TEST: CPT

## 2022-07-22 PROCEDURE — 73610 X-RAY EXAM OF ANKLE: CPT

## 2022-07-22 PROCEDURE — 84550 ASSAY OF BLOOD/URIC ACID: CPT

## 2022-07-22 PROCEDURE — 73630 X-RAY EXAM OF FOOT: CPT

## 2022-07-22 PROCEDURE — 82607 VITAMIN B-12: CPT

## 2022-07-24 LAB
HCV AB S/CO SERPL IA: 0.1 S/CO RATIO (ref 0–0.9)
SL AMB INTERPRETATION: NORMAL

## 2022-07-25 LAB
ACE SERPL-CCNC: 77 U/L (ref 14–82)
ANA HOMOGEN SER QL IF: NORMAL
ANA HOMOGEN TITR SER: NORMAL {TITER}
CENTROMERE B AB SER-ACNC: <0.2 AI (ref 0–0.9)
ENA SCL70 AB SER-ACNC: <0.2 AI (ref 0–0.9)
RYE IGE QN: POSITIVE

## 2023-02-16 DIAGNOSIS — I10 ESSENTIAL HYPERTENSION: ICD-10-CM

## 2023-02-28 ENCOUNTER — OFFICE VISIT (OUTPATIENT)
Dept: CARDIOLOGY CLINIC | Facility: CLINIC | Age: 63
End: 2023-02-28

## 2023-02-28 VITALS
DIASTOLIC BLOOD PRESSURE: 80 MMHG | SYSTOLIC BLOOD PRESSURE: 148 MMHG | HEART RATE: 78 BPM | OXYGEN SATURATION: 98 % | WEIGHT: 168 LBS | BODY MASS INDEX: 24.88 KG/M2 | HEIGHT: 69 IN

## 2023-02-28 DIAGNOSIS — E78.2 MIXED HYPERLIPIDEMIA: ICD-10-CM

## 2023-02-28 DIAGNOSIS — R09.89 BRUIT (ARTERIAL): ICD-10-CM

## 2023-02-28 DIAGNOSIS — I73.9 PERIPHERAL VASCULAR DISEASE (HCC): ICD-10-CM

## 2023-02-28 DIAGNOSIS — I73.9 CLAUDICATION OF BOTH LOWER EXTREMITIES (HCC): ICD-10-CM

## 2023-02-28 DIAGNOSIS — I10 ESSENTIAL HYPERTENSION: Primary | ICD-10-CM

## 2023-02-28 DIAGNOSIS — M95.4 CHEST WALL DEFORMITY: ICD-10-CM

## 2023-02-28 DIAGNOSIS — I74.3 EMBOLISM AND THROMBOSIS OF ARTERIES OF THE LOWER EXTREMITIES (HCC): ICD-10-CM

## 2023-02-28 DIAGNOSIS — F17.200 SMOKING: ICD-10-CM

## 2023-02-28 DIAGNOSIS — N18.32 STAGE 3B CHRONIC KIDNEY DISEASE (HCC): ICD-10-CM

## 2023-02-28 RX ORDER — LOSARTAN POTASSIUM 25 MG/1
25 TABLET ORAL DAILY
Qty: 90 TABLET | Refills: 1 | Status: SHIPPED | OUTPATIENT
Start: 2023-02-28

## 2023-02-28 RX ORDER — EZETIMIBE 10 MG/1
10 TABLET ORAL DAILY
Qty: 90 TABLET | Refills: 3 | Status: SHIPPED | OUTPATIENT
Start: 2023-02-28

## 2023-02-28 RX ORDER — HYDROCHLOROTHIAZIDE 12.5 MG/1
TABLET ORAL
COMMUNITY
Start: 2023-01-20

## 2023-02-28 RX ORDER — ASPIRIN 81 MG/1
81 TABLET ORAL
Qty: 90 TABLET | Refills: 3 | Status: SHIPPED | OUTPATIENT
Start: 2023-02-28

## 2023-02-28 RX ORDER — CILOSTAZOL 50 MG/1
50 TABLET ORAL 2 TIMES DAILY
Qty: 180 TABLET | Refills: 1 | Status: SHIPPED | OUTPATIENT
Start: 2023-02-28
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
## 2023-06-14 ENCOUNTER — HOSPITAL ENCOUNTER (OUTPATIENT)
Dept: RADIOLOGY | Facility: HOSPITAL | Age: 63
Discharge: HOME | End: 2023-06-14
Payer: MEDICARE

## 2023-06-14 DIAGNOSIS — Z87.891 PERSONAL HISTORY OF TOBACCO USE, PRESENTING HAZARDS TO HEALTH: ICD-10-CM

## 2023-06-14 PROCEDURE — 71271 CT THORAX LUNG CANCER SCR C-: CPT

## 2023-08-09 DIAGNOSIS — N18.32 STAGE 3B CHRONIC KIDNEY DISEASE (HCC): ICD-10-CM

## 2023-08-09 DIAGNOSIS — I74.3 EMBOLISM AND THROMBOSIS OF ARTERIES OF THE LOWER EXTREMITIES (HCC): ICD-10-CM

## 2023-08-09 DIAGNOSIS — I10 ESSENTIAL HYPERTENSION: ICD-10-CM

## 2023-08-09 RX ORDER — LOSARTAN POTASSIUM 25 MG/1
25 TABLET ORAL DAILY
Qty: 30 TABLET | Refills: 0 | Status: SHIPPED | OUTPATIENT
Start: 2023-08-09 | End: 2023-09-14 | Stop reason: SDUPTHER

## 2023-09-07 ENCOUNTER — APPOINTMENT (OUTPATIENT)
Dept: LAB | Facility: CLINIC | Age: 63
End: 2023-09-07
Payer: MEDICARE

## 2023-09-07 DIAGNOSIS — N18.32 STAGE 3B CHRONIC KIDNEY DISEASE (HCC): ICD-10-CM

## 2023-09-07 DIAGNOSIS — E78.2 MIXED HYPERLIPIDEMIA: ICD-10-CM

## 2023-09-07 DIAGNOSIS — I73.9 CLAUDICATION OF BOTH LOWER EXTREMITIES (HCC): ICD-10-CM

## 2023-09-07 DIAGNOSIS — I73.9 PERIPHERAL VASCULAR DISEASE (HCC): ICD-10-CM

## 2023-09-07 LAB
ALBUMIN SERPL BCP-MCNC: 3.6 G/DL (ref 3.5–5)
ALP SERPL-CCNC: 58 U/L (ref 34–104)
ALT SERPL W P-5'-P-CCNC: 14 U/L (ref 7–52)
ANION GAP SERPL CALCULATED.3IONS-SCNC: 8 MMOL/L
AST SERPL W P-5'-P-CCNC: 25 U/L (ref 13–39)
BILIRUB SERPL-MCNC: 0.55 MG/DL (ref 0.2–1)
BUN SERPL-MCNC: 10 MG/DL (ref 5–25)
CALCIUM SERPL-MCNC: 9.1 MG/DL (ref 8.4–10.2)
CHLORIDE SERPL-SCNC: 99 MMOL/L (ref 96–108)
CO2 SERPL-SCNC: 27 MMOL/L (ref 21–32)
CREAT SERPL-MCNC: 1.16 MG/DL (ref 0.6–1.3)
GFR SERPL CREATININE-BSD FRML MDRD: 66 ML/MIN/1.73SQ M
GLUCOSE P FAST SERPL-MCNC: 73 MG/DL (ref 65–99)
POTASSIUM SERPL-SCNC: 4.2 MMOL/L (ref 3.5–5.3)
PROT SERPL-MCNC: 7 G/DL (ref 6.4–8.4)
SODIUM SERPL-SCNC: 134 MMOL/L (ref 135–147)

## 2023-09-07 PROCEDURE — 36415 COLL VENOUS BLD VENIPUNCTURE: CPT

## 2023-09-07 PROCEDURE — 83704 LIPOPROTEIN BLD QUAN PART: CPT

## 2023-09-07 PROCEDURE — 80053 COMPREHEN METABOLIC PANEL: CPT

## 2023-09-07 PROCEDURE — 80061 LIPID PANEL: CPT

## 2023-09-13 LAB
CHOLEST SERPL-MCNC: 131 MG/DL (ref 100–199)
HDL SERPL-SCNC: 22.6 UMOL/L
HDLC SERPL-MCNC: 62 MG/DL
LDL SERPL QN: 21.6 NM
LDL SERPL QN: <90 NMOL/L
LDL SERPL-SCNC: 403 NMOL/L
LDLC SERPL CALC-MCNC: 56 MG/DL (ref 0–99)
LP-IR SCORE SERPL: <25
TRIGL SERPL-MCNC: 63 MG/DL (ref 0–149)

## 2023-09-14 ENCOUNTER — OFFICE VISIT (OUTPATIENT)
Dept: CARDIOLOGY CLINIC | Facility: CLINIC | Age: 63
End: 2023-09-14
Payer: MEDICARE

## 2023-09-14 VITALS
BODY MASS INDEX: 23.99 KG/M2 | HEART RATE: 68 BPM | SYSTOLIC BLOOD PRESSURE: 186 MMHG | OXYGEN SATURATION: 100 % | WEIGHT: 162 LBS | DIASTOLIC BLOOD PRESSURE: 80 MMHG | HEIGHT: 69 IN

## 2023-09-14 DIAGNOSIS — I73.9 PERIPHERAL VASCULAR DISEASE (HCC): ICD-10-CM

## 2023-09-14 DIAGNOSIS — I74.3 EMBOLISM AND THROMBOSIS OF ARTERIES OF THE LOWER EXTREMITIES (HCC): ICD-10-CM

## 2023-09-14 DIAGNOSIS — F41.9 ANXIETY: ICD-10-CM

## 2023-09-14 DIAGNOSIS — N18.32 STAGE 3B CHRONIC KIDNEY DISEASE (HCC): ICD-10-CM

## 2023-09-14 DIAGNOSIS — I73.9 CLAUDICATION OF BOTH LOWER EXTREMITIES (HCC): ICD-10-CM

## 2023-09-14 DIAGNOSIS — E78.2 MIXED HYPERLIPIDEMIA: Primary | ICD-10-CM

## 2023-09-14 DIAGNOSIS — I65.23 BILATERAL CAROTID ARTERY STENOSIS: ICD-10-CM

## 2023-09-14 DIAGNOSIS — F10.180 ALCOHOL ABUSE WITH ALCOHOL-INDUCED ANXIETY DISORDER (HCC): ICD-10-CM

## 2023-09-14 DIAGNOSIS — I10 ESSENTIAL HYPERTENSION: ICD-10-CM

## 2023-09-14 PROCEDURE — 99214 OFFICE O/P EST MOD 30 MIN: CPT | Performed by: INTERNAL MEDICINE

## 2023-09-14 PROCEDURE — 93000 ELECTROCARDIOGRAM COMPLETE: CPT | Performed by: INTERNAL MEDICINE

## 2023-09-14 RX ORDER — LOSARTAN POTASSIUM 25 MG/1
25 TABLET ORAL
Qty: 90 TABLET | Refills: 1 | Status: SHIPPED | OUTPATIENT
Start: 2023-09-14

## 2023-09-14 RX ORDER — METOPROLOL TARTRATE 50 MG/1
50 TABLET, FILM COATED ORAL EVERY 12 HOURS SCHEDULED
Qty: 180 TABLET | Refills: 1 | Status: SHIPPED | OUTPATIENT
Start: 2023-09-14 | End: 2023-09-21

## 2023-09-14 RX ORDER — CILOSTAZOL 50 MG/1
50 TABLET ORAL 2 TIMES DAILY
Qty: 180 TABLET | Refills: 1 | Status: SHIPPED | OUTPATIENT
Start: 2023-09-14

## 2023-09-14 RX ORDER — AMLODIPINE BESYLATE 5 MG/1
5 TABLET ORAL EVERY MORNING
Qty: 90 TABLET | Refills: 1 | Status: SHIPPED | OUTPATIENT
Start: 2023-09-14

## 2023-09-14 RX ORDER — HYDROCHLOROTHIAZIDE 12.5 MG/1
12.5 TABLET ORAL EVERY MORNING
Qty: 90 TABLET | Refills: 1 | Status: SHIPPED | OUTPATIENT
Start: 2023-09-14 | End: 2023-09-21

## 2023-09-14 RX ORDER — EZETIMIBE 10 MG/1
10 TABLET ORAL DAILY
Qty: 90 TABLET | Refills: 3 | Status: SHIPPED | OUTPATIENT
Start: 2023-09-14

## 2023-09-14 RX ORDER — ESCITALOPRAM OXALATE 10 MG/1
10 TABLET ORAL DAILY
Qty: 90 TABLET | Refills: 1 | Status: SHIPPED | OUTPATIENT
Start: 2023-09-14

## 2023-09-14 RX ORDER — METOPROLOL TARTRATE 50 MG/1
50 TABLET, FILM COATED ORAL EVERY 12 HOURS SCHEDULED
COMMUNITY
Start: 2023-06-07 | End: 2023-09-14 | Stop reason: SDUPTHER
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
## 2023-09-21 ENCOUNTER — CLINICAL SUPPORT (OUTPATIENT)
Dept: CARDIOLOGY CLINIC | Facility: CLINIC | Age: 63
End: 2023-09-21
Payer: MEDICARE

## 2023-09-21 VITALS
BODY MASS INDEX: 23.7 KG/M2 | HEIGHT: 69 IN | SYSTOLIC BLOOD PRESSURE: 196 MMHG | WEIGHT: 160 LBS | OXYGEN SATURATION: 98 % | DIASTOLIC BLOOD PRESSURE: 94 MMHG | HEART RATE: 88 BPM

## 2023-09-21 DIAGNOSIS — I10 ESSENTIAL HYPERTENSION: Primary | ICD-10-CM

## 2023-09-21 PROCEDURE — 99211 OFF/OP EST MAY X REQ PHY/QHP: CPT

## 2023-09-21 RX ORDER — CARVEDILOL 12.5 MG/1
12.5 TABLET ORAL 2 TIMES DAILY WITH MEALS
Qty: 60 TABLET | Refills: 0 | Status: SHIPPED | OUTPATIENT
Start: 2023-09-21 | End: 2023-10-21

## 2023-09-21 RX ORDER — SPIRONOLACTONE AND HYDROCHLOROTHIAZIDE 25; 25 MG/1; MG/1
0.5 TABLET ORAL DAILY
Qty: 15 TABLET | Refills: 0 | Status: SHIPPED | OUTPATIENT
Start: 2023-09-21 | End: 2023-10-21

## 2023-09-21 NOTE — PROGRESS NOTES
Pt came in for BP check after changing medications per Dr Kadeem Rodriguez. Pt states he has cut his drinking down, had 16 beer over 7 days. Also states he has cut down to 7 cigarrettes per day. Pt does state he gets lightheaded sometimes when he stands up, occasionally needs his glasses more due to some blurred vision. Pt states he does not have those symptoms now. Pt to change medications and check his bmp in 10 days. Pt agreeable. Also advised pt if he experiences a severe headache that does not go away, severely blurred vision, chest pain or SOB he should go right to the ED. Pt agreeable. Also encouraged pt to continue to decrease the amount of alcohol and cigarettes he has.

## 2023-09-26 ENCOUNTER — TELEPHONE (OUTPATIENT)
Dept: PSYCHIATRY | Facility: CLINIC | Age: 63
End: 2023-09-26

## 2023-09-26 NOTE — TELEPHONE ENCOUNTER
Contacted patient in regards to Routine Referral in attempts to verify patient's needs of services and add patient to proper wait list. LVM for patient to contact intake dept  in regards to referral.

## 2023-09-27 NOTE — TELEPHONE ENCOUNTER
Patient contacted the office seeking services. Upon review writer noticed patient has [employer] for [employer]. Writer informed the patient that the insurance is Out of network. Patient verbalized understanding. Outside [employer] [facility].

## 2023-10-21 DIAGNOSIS — I10 ESSENTIAL HYPERTENSION: ICD-10-CM

## 2023-10-23 RX ORDER — CARVEDILOL 12.5 MG/1
TABLET ORAL
Qty: 60 TABLET | Refills: 0 | Status: SHIPPED | OUTPATIENT
Start: 2023-10-23

## 2023-10-23 RX ORDER — SPIRONOLACTONE AND HYDROCHLOROTHIAZIDE 25; 25 MG/1; MG/1
0.5 TABLET ORAL DAILY
Qty: 15 TABLET | Refills: 0 | Status: SHIPPED | OUTPATIENT
Start: 2023-10-23 | End: 2023-11-22

## 2023-11-24 DIAGNOSIS — I10 ESSENTIAL HYPERTENSION: ICD-10-CM

## 2023-11-24 RX ORDER — CARVEDILOL 12.5 MG/1
TABLET ORAL
Qty: 60 TABLET | Refills: 0 | Status: SHIPPED | OUTPATIENT
Start: 2023-11-24

## 2023-11-29 ENCOUNTER — APPOINTMENT (OUTPATIENT)
Dept: LAB | Facility: CLINIC | Age: 63
End: 2023-11-29
Payer: MEDICARE

## 2023-11-29 DIAGNOSIS — I10 ESSENTIAL HYPERTENSION: ICD-10-CM

## 2023-11-29 LAB
ANION GAP SERPL CALCULATED.3IONS-SCNC: 7 MMOL/L
BUN SERPL-MCNC: 13 MG/DL (ref 5–25)
CALCIUM SERPL-MCNC: 9.2 MG/DL (ref 8.4–10.2)
CHLORIDE SERPL-SCNC: 98 MMOL/L (ref 96–108)
CO2 SERPL-SCNC: 30 MMOL/L (ref 21–32)
CREAT SERPL-MCNC: 1.17 MG/DL (ref 0.6–1.3)
GFR SERPL CREATININE-BSD FRML MDRD: 65 ML/MIN/1.73SQ M
GLUCOSE SERPL-MCNC: 90 MG/DL (ref 65–140)
POTASSIUM SERPL-SCNC: 4.6 MMOL/L (ref 3.5–5.3)
SODIUM SERPL-SCNC: 135 MMOL/L (ref 135–147)

## 2023-11-29 PROCEDURE — 80048 BASIC METABOLIC PNL TOTAL CA: CPT

## 2023-11-29 PROCEDURE — 36415 COLL VENOUS BLD VENIPUNCTURE: CPT

## 2023-11-29 RX ORDER — SPIRONOLACTONE AND HYDROCHLOROTHIAZIDE 25; 25 MG/1; MG/1
0.5 TABLET ORAL DAILY
Qty: 15 TABLET | Refills: 0 | Status: SHIPPED | OUTPATIENT
Start: 2023-11-29 | End: 2023-12-08 | Stop reason: SDUPTHER

## 2023-11-30 ENCOUNTER — TELEPHONE (OUTPATIENT)
Dept: CARDIOLOGY CLINIC | Facility: CLINIC | Age: 63
End: 2023-11-30

## 2023-11-30 NOTE — TELEPHONE ENCOUNTER
----- Message from Wendie Shahid MD sent at 11/29/2023  5:13 PM EST -----  Patient blood test reviewed. They are acceptable. Will continue same medication. Patient should keep his appointment for follow-up.

## 2023-12-08 ENCOUNTER — OFFICE VISIT (OUTPATIENT)
Dept: CARDIOLOGY CLINIC | Facility: CLINIC | Age: 63
End: 2023-12-08
Payer: MEDICARE

## 2023-12-08 VITALS
BODY MASS INDEX: 24.73 KG/M2 | HEIGHT: 69 IN | HEART RATE: 62 BPM | OXYGEN SATURATION: 98 % | WEIGHT: 167 LBS | DIASTOLIC BLOOD PRESSURE: 72 MMHG | SYSTOLIC BLOOD PRESSURE: 154 MMHG

## 2023-12-08 DIAGNOSIS — F41.9 ANXIETY: ICD-10-CM

## 2023-12-08 DIAGNOSIS — I10 ESSENTIAL HYPERTENSION: ICD-10-CM

## 2023-12-08 DIAGNOSIS — I73.9 CLAUDICATION OF BOTH LOWER EXTREMITIES (HCC): ICD-10-CM

## 2023-12-08 DIAGNOSIS — E78.2 MIXED HYPERLIPIDEMIA: ICD-10-CM

## 2023-12-08 DIAGNOSIS — N18.32 STAGE 3B CHRONIC KIDNEY DISEASE (HCC): ICD-10-CM

## 2023-12-08 DIAGNOSIS — I73.9 PERIPHERAL VASCULAR DISEASE (HCC): ICD-10-CM

## 2023-12-08 DIAGNOSIS — I74.3 EMBOLISM AND THROMBOSIS OF ARTERIES OF THE LOWER EXTREMITIES (HCC): ICD-10-CM

## 2023-12-08 DIAGNOSIS — I65.23 BILATERAL CAROTID ARTERY STENOSIS: ICD-10-CM

## 2023-12-08 PROCEDURE — 99214 OFFICE O/P EST MOD 30 MIN: CPT | Performed by: INTERNAL MEDICINE

## 2023-12-08 RX ORDER — CILOSTAZOL 50 MG/1
50 TABLET ORAL 2 TIMES DAILY
Qty: 180 TABLET | Refills: 3 | Status: SHIPPED | OUTPATIENT
Start: 2023-12-08

## 2023-12-08 RX ORDER — LOSARTAN POTASSIUM 25 MG/1
25 TABLET ORAL
Qty: 90 TABLET | Refills: 3 | Status: SHIPPED | OUTPATIENT
Start: 2023-12-08

## 2023-12-08 RX ORDER — EZETIMIBE 10 MG/1
10 TABLET ORAL DAILY
Qty: 90 TABLET | Refills: 3 | Status: SHIPPED | OUTPATIENT
Start: 2023-12-08

## 2023-12-08 RX ORDER — SPIRONOLACTONE AND HYDROCHLOROTHIAZIDE 25; 25 MG/1; MG/1
0.5 TABLET ORAL DAILY
Qty: 90 TABLET | Refills: 3 | Status: SHIPPED | OUTPATIENT
Start: 2023-12-08

## 2023-12-08 RX ORDER — ESCITALOPRAM OXALATE 20 MG/1
20 TABLET ORAL DAILY
Qty: 90 TABLET | Refills: 1 | Status: SHIPPED | OUTPATIENT
Start: 2023-12-08

## 2023-12-08 RX ORDER — ESCITALOPRAM OXALATE 10 MG/1
10 TABLET ORAL DAILY
Qty: 90 TABLET | Refills: 3 | Status: SHIPPED | OUTPATIENT
Start: 2023-12-08 | End: 2023-12-08 | Stop reason: SDUPTHER

## 2023-12-08 RX ORDER — CARVEDILOL 12.5 MG/1
12.5 TABLET ORAL 2 TIMES DAILY WITH MEALS
Qty: 180 TABLET | Refills: 3 | Status: SHIPPED | OUTPATIENT
Start: 2023-12-08

## 2023-12-08 RX ORDER — AMLODIPINE BESYLATE 5 MG/1
5 TABLET ORAL EVERY MORNING
Qty: 90 TABLET | Refills: 1 | Status: SHIPPED | OUTPATIENT
Start: 2023-12-08
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
## 2024-03-07 DIAGNOSIS — I74.3 EMBOLISM AND THROMBOSIS OF ARTERIES OF THE LOWER EXTREMITIES (HCC): ICD-10-CM

## 2024-03-07 DIAGNOSIS — F41.9 ANXIETY: ICD-10-CM

## 2024-03-07 DIAGNOSIS — I10 ESSENTIAL HYPERTENSION: ICD-10-CM

## 2024-03-07 RX ORDER — AMLODIPINE BESYLATE 5 MG/1
5 TABLET ORAL EVERY MORNING
Qty: 90 TABLET | Refills: 0 | Status: SHIPPED | OUTPATIENT
Start: 2024-03-07

## 2024-03-07 RX ORDER — ESCITALOPRAM OXALATE 20 MG/1
20 TABLET ORAL DAILY
Qty: 90 TABLET | Refills: 0 | Status: SHIPPED | OUTPATIENT
Start: 2024-03-07

## 2024-05-07 ENCOUNTER — APPOINTMENT (OUTPATIENT)
Dept: LAB | Facility: CLINIC | Age: 64
End: 2024-05-07
Payer: MEDICARE

## 2024-05-07 DIAGNOSIS — N18.32 STAGE 3B CHRONIC KIDNEY DISEASE (HCC): ICD-10-CM

## 2024-05-07 DIAGNOSIS — E78.2 MIXED HYPERLIPIDEMIA: ICD-10-CM

## 2024-05-07 DIAGNOSIS — I74.3 EMBOLISM AND THROMBOSIS OF ARTERIES OF THE LOWER EXTREMITIES (HCC): ICD-10-CM

## 2024-05-07 LAB
ANION GAP SERPL CALCULATED.3IONS-SCNC: 10 MMOL/L (ref 4–13)
BUN SERPL-MCNC: 16 MG/DL (ref 5–25)
CALCIUM SERPL-MCNC: 9.6 MG/DL (ref 8.4–10.2)
CHLORIDE SERPL-SCNC: 98 MMOL/L (ref 96–108)
CO2 SERPL-SCNC: 26 MMOL/L (ref 21–32)
CREAT SERPL-MCNC: 1.1 MG/DL (ref 0.6–1.3)
ERYTHROCYTE [DISTWIDTH] IN BLOOD BY AUTOMATED COUNT: 13 % (ref 11.6–15.1)
GFR SERPL CREATININE-BSD FRML MDRD: 70 ML/MIN/1.73SQ M
GLUCOSE P FAST SERPL-MCNC: 92 MG/DL (ref 65–99)
HCT VFR BLD AUTO: 46.7 % (ref 36.5–49.3)
HGB BLD-MCNC: 16.3 G/DL (ref 12–17)
MCH RBC QN AUTO: 34.4 PG (ref 26.8–34.3)
MCHC RBC AUTO-ENTMCNC: 34.9 G/DL (ref 31.4–37.4)
MCV RBC AUTO: 99 FL (ref 82–98)
PLATELET # BLD AUTO: 144 THOUSANDS/UL (ref 149–390)
PMV BLD AUTO: 10 FL (ref 8.9–12.7)
POTASSIUM SERPL-SCNC: 4.6 MMOL/L (ref 3.5–5.3)
RBC # BLD AUTO: 4.74 MILLION/UL (ref 3.88–5.62)
SODIUM SERPL-SCNC: 134 MMOL/L (ref 135–147)
WBC # BLD AUTO: 8.28 THOUSAND/UL (ref 4.31–10.16)

## 2024-05-07 PROCEDURE — 85027 COMPLETE CBC AUTOMATED: CPT

## 2024-05-07 PROCEDURE — 80048 BASIC METABOLIC PNL TOTAL CA: CPT

## 2024-05-07 PROCEDURE — 36415 COLL VENOUS BLD VENIPUNCTURE: CPT

## 2024-05-30 ENCOUNTER — OFFICE VISIT (OUTPATIENT)
Dept: CARDIOLOGY CLINIC | Facility: CLINIC | Age: 64
End: 2024-05-30
Payer: MEDICARE

## 2024-05-30 VITALS
WEIGHT: 158 LBS | DIASTOLIC BLOOD PRESSURE: 80 MMHG | BODY MASS INDEX: 23.4 KG/M2 | HEIGHT: 69 IN | OXYGEN SATURATION: 98 % | HEART RATE: 75 BPM | SYSTOLIC BLOOD PRESSURE: 140 MMHG

## 2024-05-30 DIAGNOSIS — E78.5 HYPERLIPIDEMIA, UNSPECIFIED HYPERLIPIDEMIA TYPE: ICD-10-CM

## 2024-05-30 DIAGNOSIS — I10 HYPERTENSION, UNSPECIFIED TYPE: Primary | ICD-10-CM

## 2024-05-30 DIAGNOSIS — I65.23 BILATERAL CAROTID ARTERY STENOSIS: ICD-10-CM

## 2024-05-30 PROCEDURE — 99214 OFFICE O/P EST MOD 30 MIN: CPT | Performed by: PHYSICIAN ASSISTANT

## 2024-05-30 PROCEDURE — 93000 ELECTROCARDIOGRAM COMPLETE: CPT | Performed by: PHYSICIAN ASSISTANT
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
## 2024-07-01 DIAGNOSIS — I10 ESSENTIAL HYPERTENSION: ICD-10-CM

## 2024-07-01 DIAGNOSIS — I74.3 EMBOLISM AND THROMBOSIS OF ARTERIES OF THE LOWER EXTREMITIES (HCC): ICD-10-CM

## 2024-07-01 DIAGNOSIS — F41.9 ANXIETY: ICD-10-CM

## 2024-07-02 RX ORDER — AMLODIPINE BESYLATE 5 MG/1
5 TABLET ORAL EVERY MORNING
Qty: 90 TABLET | Refills: 1 | Status: SHIPPED | OUTPATIENT
Start: 2024-07-02

## 2024-07-02 RX ORDER — ESCITALOPRAM OXALATE 20 MG/1
20 TABLET ORAL DAILY
Qty: 90 TABLET | Refills: 1 | Status: SHIPPED | OUTPATIENT
Start: 2024-07-02

## 2024-09-16 DIAGNOSIS — I10 ESSENTIAL HYPERTENSION: ICD-10-CM

## 2024-09-17 RX ORDER — CARVEDILOL 12.5 MG/1
TABLET ORAL
Qty: 180 TABLET | Refills: 1 | Status: SHIPPED | OUTPATIENT
Start: 2024-09-17

## 2024-09-30 DIAGNOSIS — I74.3 EMBOLISM AND THROMBOSIS OF ARTERIES OF THE LOWER EXTREMITIES (HCC): ICD-10-CM

## 2024-09-30 DIAGNOSIS — F41.9 ANXIETY: ICD-10-CM

## 2024-09-30 DIAGNOSIS — I10 ESSENTIAL HYPERTENSION: ICD-10-CM

## 2024-10-01 RX ORDER — AMLODIPINE BESYLATE 5 MG/1
5 TABLET ORAL EVERY MORNING
Qty: 90 TABLET | Refills: 1 | Status: SHIPPED | OUTPATIENT
Start: 2024-10-01

## 2024-10-01 RX ORDER — ESCITALOPRAM OXALATE 20 MG/1
20 TABLET ORAL DAILY
Qty: 90 TABLET | Refills: 1 | Status: SHIPPED | OUTPATIENT
Start: 2024-10-01

## 2024-11-21 ENCOUNTER — TELEPHONE (OUTPATIENT)
Age: 64
End: 2024-11-21

## 2024-11-21 NOTE — TELEPHONE ENCOUNTER
Caller: Brent     Doctor: Dr. Vyas     Reason for call: patient would like to know if he needs routine blood work and if scripts could be added to his chart if they are needed. Also, can an appointment reminder be sent to patient's address? Please advise and call patient if blood work needs to be done.    Call back#: [phone]

## 2024-12-01 DIAGNOSIS — I73.9 PERIPHERAL VASCULAR DISEASE (HCC): ICD-10-CM

## 2024-12-01 DIAGNOSIS — E78.2 MIXED HYPERLIPIDEMIA: ICD-10-CM

## 2024-12-01 DIAGNOSIS — I65.23 BILATERAL CAROTID ARTERY STENOSIS: ICD-10-CM

## 2024-12-03 DIAGNOSIS — I73.9 CLAUDICATION OF BOTH LOWER EXTREMITIES (HCC): ICD-10-CM

## 2024-12-03 DIAGNOSIS — I74.3 EMBOLISM AND THROMBOSIS OF ARTERIES OF THE LOWER EXTREMITIES (HCC): ICD-10-CM

## 2024-12-03 RX ORDER — EZETIMIBE 10 MG/1
10 TABLET ORAL DAILY
Qty: 30 TABLET | Refills: 0 | Status: SHIPPED | OUTPATIENT
Start: 2024-12-03

## 2024-12-04 RX ORDER — ASPIRIN 81 MG/1
TABLET ORAL
Qty: 90 TABLET | Refills: 0 | Status: SHIPPED | OUTPATIENT
Start: 2024-12-04

## 2025-01-14 ENCOUNTER — OFFICE VISIT (OUTPATIENT)
Dept: CARDIOLOGY CLINIC | Facility: CLINIC | Age: 65
End: 2025-01-14
Payer: MEDICARE

## 2025-01-14 VITALS
WEIGHT: 165 LBS | OXYGEN SATURATION: 96 % | DIASTOLIC BLOOD PRESSURE: 80 MMHG | HEIGHT: 69 IN | SYSTOLIC BLOOD PRESSURE: 124 MMHG | HEART RATE: 68 BPM | BODY MASS INDEX: 24.44 KG/M2

## 2025-01-14 DIAGNOSIS — I73.9 PERIPHERAL VASCULAR DISEASE (HCC): ICD-10-CM

## 2025-01-14 DIAGNOSIS — E78.2 MIXED HYPERLIPIDEMIA: ICD-10-CM

## 2025-01-14 DIAGNOSIS — I73.9 CLAUDICATION OF BOTH LOWER EXTREMITIES (HCC): ICD-10-CM

## 2025-01-14 DIAGNOSIS — I10 ESSENTIAL HYPERTENSION: Primary | ICD-10-CM

## 2025-01-14 DIAGNOSIS — I65.23 BILATERAL CAROTID ARTERY STENOSIS: ICD-10-CM

## 2025-01-14 DIAGNOSIS — F41.9 ANXIETY: ICD-10-CM

## 2025-01-14 DIAGNOSIS — I74.3 EMBOLISM AND THROMBOSIS OF ARTERIES OF THE LOWER EXTREMITIES (HCC): ICD-10-CM

## 2025-01-14 DIAGNOSIS — N18.32 STAGE 3B CHRONIC KIDNEY DISEASE (HCC): ICD-10-CM

## 2025-01-14 PROCEDURE — 93000 ELECTROCARDIOGRAM COMPLETE: CPT | Performed by: INTERNAL MEDICINE

## 2025-01-14 PROCEDURE — 99214 OFFICE O/P EST MOD 30 MIN: CPT | Performed by: INTERNAL MEDICINE

## 2025-01-14 RX ORDER — AMLODIPINE BESYLATE 5 MG/1
5 TABLET ORAL EVERY MORNING
Qty: 90 TABLET | Refills: 3 | Status: SHIPPED | OUTPATIENT
Start: 2025-01-14

## 2025-01-14 RX ORDER — CILOSTAZOL 50 MG/1
50 TABLET ORAL 2 TIMES DAILY
Qty: 180 TABLET | Refills: 3 | Status: SHIPPED | OUTPATIENT
Start: 2025-01-14

## 2025-01-14 RX ORDER — ESCITALOPRAM OXALATE 20 MG/1
20 TABLET ORAL DAILY
Qty: 90 TABLET | Refills: 1 | Status: SHIPPED | OUTPATIENT
Start: 2025-01-14

## 2025-01-14 RX ORDER — CARVEDILOL 12.5 MG/1
12.5 TABLET ORAL 2 TIMES DAILY WITH MEALS
Qty: 180 TABLET | Refills: 1 | Status: SHIPPED | OUTPATIENT
Start: 2025-01-14

## 2025-01-14 RX ORDER — LOSARTAN POTASSIUM 25 MG/1
25 TABLET ORAL
Qty: 90 TABLET | Refills: 3 | Status: SHIPPED | OUTPATIENT
Start: 2025-01-14

## 2025-01-14 RX ORDER — SPIRONOLACTONE AND HYDROCHLOROTHIAZIDE 25; 25 MG/1; MG/1
0.5 TABLET ORAL DAILY
Qty: 90 TABLET | Refills: 3 | Status: SHIPPED | OUTPATIENT
Start: 2025-01-14

## 2025-01-14 RX ORDER — ASPIRIN 81 MG/1
81 TABLET ORAL DAILY
Qty: 90 TABLET | Refills: 1 | Status: SHIPPED | OUTPATIENT
Start: 2025-01-14

## 2025-01-14 RX ORDER — EZETIMIBE 10 MG/1
10 TABLET ORAL DAILY
Qty: 90 TABLET | Refills: 3 | Status: SHIPPED | OUTPATIENT
Start: 2025-01-14
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
## 2025-01-16 ENCOUNTER — APPOINTMENT (OUTPATIENT)
Dept: LAB | Facility: CLINIC | Age: 65
End: 2025-01-16
Payer: MEDICARE

## 2025-01-16 DIAGNOSIS — I73.9 PERIPHERAL VASCULAR DISEASE (HCC): ICD-10-CM

## 2025-01-16 DIAGNOSIS — I10 HYPERTENSION, UNSPECIFIED TYPE: ICD-10-CM

## 2025-01-16 DIAGNOSIS — I10 ESSENTIAL HYPERTENSION: ICD-10-CM

## 2025-01-16 DIAGNOSIS — I65.23 BILATERAL CAROTID ARTERY STENOSIS: ICD-10-CM

## 2025-01-16 LAB
BNP SERPL-MCNC: 48 PG/ML (ref 0–100)
ERYTHROCYTE [DISTWIDTH] IN BLOOD BY AUTOMATED COUNT: 13 % (ref 11.6–15.1)
HCT VFR BLD AUTO: 46.5 % (ref 36.5–49.3)
HGB BLD-MCNC: 16.4 G/DL (ref 12–17)
MCH RBC QN AUTO: 34.7 PG (ref 26.8–34.3)
MCHC RBC AUTO-ENTMCNC: 35.3 G/DL (ref 31.4–37.4)
MCV RBC AUTO: 98 FL (ref 82–98)
PLATELET # BLD AUTO: 172 THOUSANDS/UL (ref 149–390)
PMV BLD AUTO: 10.3 FL (ref 8.9–12.7)
RBC # BLD AUTO: 4.73 MILLION/UL (ref 3.88–5.62)
WBC # BLD AUTO: 7.74 THOUSAND/UL (ref 4.31–10.16)

## 2025-01-16 PROCEDURE — 36415 COLL VENOUS BLD VENIPUNCTURE: CPT

## 2025-01-16 PROCEDURE — 82172 ASSAY OF APOLIPOPROTEIN: CPT

## 2025-01-16 PROCEDURE — 83880 ASSAY OF NATRIURETIC PEPTIDE: CPT

## 2025-01-16 PROCEDURE — 80053 COMPREHEN METABOLIC PANEL: CPT

## 2025-01-16 PROCEDURE — 80061 LIPID PANEL: CPT

## 2025-01-16 PROCEDURE — 85027 COMPLETE CBC AUTOMATED: CPT

## 2025-01-17 LAB
ALBUMIN SERPL BCG-MCNC: 4 G/DL (ref 3.5–5)
ALP SERPL-CCNC: 50 U/L (ref 34–104)
ALT SERPL W P-5'-P-CCNC: 25 U/L (ref 7–52)
ANION GAP SERPL CALCULATED.3IONS-SCNC: 10 MMOL/L (ref 4–13)
AST SERPL W P-5'-P-CCNC: 34 U/L (ref 13–39)
BILIRUB SERPL-MCNC: 1 MG/DL (ref 0.2–1)
BUN SERPL-MCNC: 18 MG/DL (ref 5–25)
CALCIUM SERPL-MCNC: 8.6 MG/DL (ref 8.4–10.2)
CHLORIDE SERPL-SCNC: 97 MMOL/L (ref 96–108)
CHOLEST SERPL-MCNC: 88 MG/DL (ref ?–200)
CO2 SERPL-SCNC: 26 MMOL/L (ref 21–32)
CREAT SERPL-MCNC: 1.2 MG/DL (ref 0.6–1.3)
GFR SERPL CREATININE-BSD FRML MDRD: 63 ML/MIN/1.73SQ M
GLUCOSE P FAST SERPL-MCNC: 89 MG/DL (ref 65–99)
HDLC SERPL-MCNC: 59 MG/DL
LDLC SERPL CALC-MCNC: 22 MG/DL (ref 0–100)
NONHDLC SERPL-MCNC: 29 MG/DL
POTASSIUM SERPL-SCNC: 4.3 MMOL/L (ref 3.5–5.3)
PROT SERPL-MCNC: 7.8 G/DL (ref 6.4–8.4)
SODIUM SERPL-SCNC: 133 MMOL/L (ref 135–147)
TRIGL SERPL-MCNC: 37 MG/DL (ref ?–150)

## 2025-01-18 LAB — APO B SERPL-MCNC: 34 MG/DL

## 2025-01-20 ENCOUNTER — RESULTS FOLLOW-UP (OUTPATIENT)
Dept: CARDIOLOGY CLINIC | Facility: CLINIC | Age: 65
End: 2025-01-20

## 2025-01-20 NOTE — TELEPHONE ENCOUNTER
"Patient returning call. Relayed message from Dr. Vyas:  \"Kushal Vyas MD to Cardiology [facility] [facility]  Regarding results: 5     1/20/25  9:52 AM  Result Note  Labwork looks stable. Just needs to quit alcohol\"  Patient verbalized understanding.   "

## 2025-06-06 ENCOUNTER — APPOINTMENT (EMERGENCY)
Dept: RADIOLOGY | Facility: HOSPITAL | Age: 65
End: 2025-06-06
Payer: MEDICARE

## 2025-06-06 ENCOUNTER — APPOINTMENT (INPATIENT)
Dept: RADIOLOGY | Facility: HOSPITAL | Age: 65
End: 2025-06-06
Payer: MEDICARE

## 2025-06-06 ENCOUNTER — HOSPITAL ENCOUNTER (INPATIENT)
Facility: HOSPITAL | Age: 65
LOS: 2 days | Discharge: HOME | End: 2025-06-08
Attending: EMERGENCY MEDICINE | Admitting: INTERNAL MEDICINE
Payer: MEDICARE

## 2025-06-06 DIAGNOSIS — R10.9 ABDOMINAL PAIN: ICD-10-CM

## 2025-06-06 DIAGNOSIS — K85.20 ALCOHOL-INDUCED ACUTE PANCREATITIS, UNSPECIFIED COMPLICATION STATUS: ICD-10-CM

## 2025-06-06 DIAGNOSIS — K85.90 PANCREATITIS: Primary | ICD-10-CM

## 2025-06-06 PROBLEM — E87.1 HYPONATREMIA: Status: ACTIVE | Noted: 2025-06-06

## 2025-06-06 LAB
2HR DELTA HS TROPONIN: -2 NG/L
4HR DELTA HS TROPONIN: 0 NG/L
ALBUMIN SERPL BCG-MCNC: 3.4 G/DL (ref 3.5–5)
ALBUMIN SERPL BCG-MCNC: 3.9 G/DL (ref 3.5–5)
ALP SERPL-CCNC: 69 U/L (ref 34–104)
ALP SERPL-CCNC: 77 U/L (ref 34–104)
ALT SERPL W P-5'-P-CCNC: 23 U/L (ref 7–52)
ALT SERPL W P-5'-P-CCNC: 27 U/L (ref 7–52)
ANION GAP SERPL CALCULATED.3IONS-SCNC: 10 MMOL/L (ref 4–13)
ANION GAP SERPL CALCULATED.3IONS-SCNC: 10 MMOL/L (ref 4–13)
APTT PPP: 24 SECONDS (ref 23–34)
APTT PPP: 25 SECONDS (ref 23–34)
AST SERPL W P-5'-P-CCNC: 26 U/L (ref 13–39)
AST SERPL W P-5'-P-CCNC: 31 U/L (ref 13–39)
BACTERIA UR QL AUTO: NORMAL /HPF
BASOPHILS # BLD AUTO: 0.08 THOUSANDS/ÂΜL (ref 0–0.1)
BASOPHILS # BLD AUTO: 0.08 THOUSANDS/ÂΜL (ref 0–0.1)
BASOPHILS NFR BLD AUTO: 1 % (ref 0–1)
BASOPHILS NFR BLD AUTO: 1 % (ref 0–1)
BILIRUB SERPL-MCNC: 1.05 MG/DL (ref 0.2–1)
BILIRUB SERPL-MCNC: 1.14 MG/DL (ref 0.2–1)
BILIRUB UR QL STRIP: NEGATIVE
BUN SERPL-MCNC: 14 MG/DL (ref 5–25)
BUN SERPL-MCNC: 15 MG/DL (ref 5–25)
CALCIUM ALBUM COR SERPL-MCNC: 9.2 MG/DL (ref 8.3–10.1)
CALCIUM SERPL-MCNC: 8.7 MG/DL (ref 8.4–10.2)
CALCIUM SERPL-MCNC: 9.6 MG/DL (ref 8.4–10.2)
CARDIAC TROPONIN I PNL SERPL HS: 11 NG/L (ref ?–50)
CARDIAC TROPONIN I PNL SERPL HS: 13 NG/L (ref ?–50)
CARDIAC TROPONIN I PNL SERPL HS: 13 NG/L (ref ?–50)
CHLORIDE SERPL-SCNC: 97 MMOL/L (ref 96–108)
CHLORIDE SERPL-SCNC: 99 MMOL/L (ref 96–108)
CHOLEST SERPL-MCNC: 99 MG/DL (ref ?–200)
CLARITY UR: CLEAR
CO2 SERPL-SCNC: 23 MMOL/L (ref 21–32)
CO2 SERPL-SCNC: 24 MMOL/L (ref 21–32)
COLOR UR: YELLOW
CREAT SERPL-MCNC: 1.01 MG/DL (ref 0.6–1.3)
CREAT SERPL-MCNC: 1.14 MG/DL (ref 0.6–1.3)
CRP SERPL QL: 43.2 MG/L
EOSINOPHIL # BLD AUTO: 0.25 THOUSAND/ÂΜL (ref 0–0.61)
EOSINOPHIL # BLD AUTO: 0.26 THOUSAND/ÂΜL (ref 0–0.61)
EOSINOPHIL NFR BLD AUTO: 3 % (ref 0–6)
EOSINOPHIL NFR BLD AUTO: 3 % (ref 0–6)
ERYTHROCYTE [DISTWIDTH] IN BLOOD BY AUTOMATED COUNT: 12.7 % (ref 11.6–15.1)
ERYTHROCYTE [DISTWIDTH] IN BLOOD BY AUTOMATED COUNT: 12.7 % (ref 11.6–15.1)
ETHANOL SERPL-MCNC: <10 MG/DL
GFR SERPL CREATININE-BSD FRML MDRD: 67 ML/MIN/1.73SQ M
GFR SERPL CREATININE-BSD FRML MDRD: 77 ML/MIN/1.73SQ M
GLUCOSE SERPL-MCNC: 91 MG/DL (ref 65–140)
GLUCOSE SERPL-MCNC: 92 MG/DL (ref 65–140)
GLUCOSE UR STRIP-MCNC: NEGATIVE MG/DL
HCT VFR BLD AUTO: 49.8 % (ref 36.5–49.3)
HCT VFR BLD AUTO: 52.8 % (ref 36.5–49.3)
HDLC SERPL-MCNC: 51 MG/DL
HGB BLD-MCNC: 17.5 G/DL (ref 12–17)
HGB BLD-MCNC: 18.8 G/DL (ref 12–17)
HGB UR QL STRIP.AUTO: ABNORMAL
IMM GRANULOCYTES # BLD AUTO: 0.02 THOUSAND/UL (ref 0–0.2)
IMM GRANULOCYTES # BLD AUTO: 0.03 THOUSAND/UL (ref 0–0.2)
IMM GRANULOCYTES NFR BLD AUTO: 0 % (ref 0–2)
IMM GRANULOCYTES NFR BLD AUTO: 0 % (ref 0–2)
INR PPP: 0.84 (ref 0.85–1.19)
INR PPP: 0.89 (ref 0.85–1.19)
KETONES UR STRIP-MCNC: ABNORMAL MG/DL
LACTATE SERPL-SCNC: 0.8 MMOL/L (ref 0.5–2)
LDLC SERPL CALC-MCNC: 38 MG/DL (ref 0–100)
LEUKOCYTE ESTERASE UR QL STRIP: NEGATIVE
LIPASE SERPL-CCNC: 1499 U/L (ref 11–82)
LIPASE SERPL-CCNC: 1510 U/L (ref 11–82)
LYMPHOCYTES # BLD AUTO: 2.7 THOUSANDS/ÂΜL (ref 0.6–4.47)
LYMPHOCYTES # BLD AUTO: 2.97 THOUSANDS/ÂΜL (ref 0.6–4.47)
LYMPHOCYTES NFR BLD AUTO: 27 % (ref 14–44)
LYMPHOCYTES NFR BLD AUTO: 29 % (ref 14–44)
MAGNESIUM SERPL-MCNC: 1.9 MG/DL (ref 1.9–2.7)
MCH RBC QN AUTO: 34.8 PG (ref 26.8–34.3)
MCH RBC QN AUTO: 34.8 PG (ref 26.8–34.3)
MCHC RBC AUTO-ENTMCNC: 35.1 G/DL (ref 31.4–37.4)
MCHC RBC AUTO-ENTMCNC: 35.6 G/DL (ref 31.4–37.4)
MCV RBC AUTO: 98 FL (ref 82–98)
MCV RBC AUTO: 99 FL (ref 82–98)
MONOCYTES # BLD AUTO: 0.96 THOUSAND/ÂΜL (ref 0.17–1.22)
MONOCYTES # BLD AUTO: 1.02 THOUSAND/ÂΜL (ref 0.17–1.22)
MONOCYTES NFR BLD AUTO: 10 % (ref 4–12)
MONOCYTES NFR BLD AUTO: 10 % (ref 4–12)
NEUTROPHILS # BLD AUTO: 5.81 THOUSANDS/ÂΜL (ref 1.85–7.62)
NEUTROPHILS # BLD AUTO: 5.98 THOUSANDS/ÂΜL (ref 1.85–7.62)
NEUTS SEG NFR BLD AUTO: 57 % (ref 43–75)
NEUTS SEG NFR BLD AUTO: 59 % (ref 43–75)
NITRITE UR QL STRIP: NEGATIVE
NON-SQ EPI CELLS URNS QL MICRO: NORMAL /HPF
NONHDLC SERPL-MCNC: 48 MG/DL
NRBC BLD AUTO-RTO: 0 /100 WBCS
NRBC BLD AUTO-RTO: 0 /100 WBCS
PH UR STRIP.AUTO: 7 [PH]
PHOSPHATE SERPL-MCNC: 3.6 MG/DL (ref 2.3–4.1)
PLATELET # BLD AUTO: 227 THOUSANDS/UL (ref 149–390)
PLATELET # BLD AUTO: 250 THOUSANDS/UL (ref 149–390)
PMV BLD AUTO: 9.1 FL (ref 8.9–12.7)
PMV BLD AUTO: 9.2 FL (ref 8.9–12.7)
POTASSIUM SERPL-SCNC: 3.9 MMOL/L (ref 3.5–5.3)
POTASSIUM SERPL-SCNC: 4 MMOL/L (ref 3.5–5.3)
PROT SERPL-MCNC: 7 G/DL (ref 6.4–8.4)
PROT SERPL-MCNC: 8 G/DL (ref 6.4–8.4)
PROT UR STRIP-MCNC: ABNORMAL MG/DL
PROTHROMBIN TIME: 12 SECONDS (ref 12.3–15)
PROTHROMBIN TIME: 12.6 SECONDS (ref 12.3–15)
RBC # BLD AUTO: 5.03 MILLION/UL (ref 3.88–5.62)
RBC # BLD AUTO: 5.4 MILLION/UL (ref 3.88–5.62)
RBC #/AREA URNS AUTO: NORMAL /HPF
SODIUM SERPL-SCNC: 131 MMOL/L (ref 135–147)
SODIUM SERPL-SCNC: 132 MMOL/L (ref 135–147)
SP GR UR STRIP.AUTO: 1.01 (ref 1–1.03)
TRIGL SERPL-MCNC: 49 MG/DL (ref ?–150)
UROBILINOGEN UR STRIP-ACNC: <2 MG/DL
WBC # BLD AUTO: 10 THOUSAND/UL (ref 4.31–10.16)
WBC # BLD AUTO: 10.16 THOUSAND/UL (ref 4.31–10.16)
WBC #/AREA URNS AUTO: NORMAL /HPF

## 2025-06-06 PROCEDURE — 86140 C-REACTIVE PROTEIN: CPT | Performed by: INTERNAL MEDICINE

## 2025-06-06 PROCEDURE — 85610 PROTHROMBIN TIME: CPT | Performed by: INTERNAL MEDICINE

## 2025-06-06 PROCEDURE — 99285 EMERGENCY DEPT VISIT HI MDM: CPT | Performed by: EMERGENCY MEDICINE

## 2025-06-06 PROCEDURE — 80053 COMPREHEN METABOLIC PANEL: CPT | Performed by: INTERNAL MEDICINE

## 2025-06-06 PROCEDURE — A9585 GADOBUTROL INJECTION: HCPCS | Performed by: INTERNAL MEDICINE

## 2025-06-06 PROCEDURE — 74177 CT ABD & PELVIS W/CONTRAST: CPT

## 2025-06-06 PROCEDURE — 74183 MRI ABD W/O CNTR FLWD CNTR: CPT

## 2025-06-06 PROCEDURE — 85025 COMPLETE CBC W/AUTO DIFF WBC: CPT | Performed by: INTERNAL MEDICINE

## 2025-06-06 PROCEDURE — 85025 COMPLETE CBC W/AUTO DIFF WBC: CPT | Performed by: EMERGENCY MEDICINE

## 2025-06-06 PROCEDURE — 83690 ASSAY OF LIPASE: CPT | Performed by: INTERNAL MEDICINE

## 2025-06-06 PROCEDURE — 96360 HYDRATION IV INFUSION INIT: CPT

## 2025-06-06 PROCEDURE — 80061 LIPID PANEL: CPT | Performed by: INTERNAL MEDICINE

## 2025-06-06 PROCEDURE — 83605 ASSAY OF LACTIC ACID: CPT | Performed by: INTERNAL MEDICINE

## 2025-06-06 PROCEDURE — 81001 URINALYSIS AUTO W/SCOPE: CPT | Performed by: INTERNAL MEDICINE

## 2025-06-06 PROCEDURE — 83690 ASSAY OF LIPASE: CPT | Performed by: EMERGENCY MEDICINE

## 2025-06-06 PROCEDURE — 85610 PROTHROMBIN TIME: CPT | Performed by: EMERGENCY MEDICINE

## 2025-06-06 PROCEDURE — 83735 ASSAY OF MAGNESIUM: CPT | Performed by: INTERNAL MEDICINE

## 2025-06-06 PROCEDURE — 80053 COMPREHEN METABOLIC PANEL: CPT | Performed by: EMERGENCY MEDICINE

## 2025-06-06 PROCEDURE — 84484 ASSAY OF TROPONIN QUANT: CPT | Performed by: EMERGENCY MEDICINE

## 2025-06-06 PROCEDURE — 82077 ASSAY SPEC XCP UR&BREATH IA: CPT | Performed by: EMERGENCY MEDICINE

## 2025-06-06 PROCEDURE — 93005 ELECTROCARDIOGRAM TRACING: CPT

## 2025-06-06 PROCEDURE — 85730 THROMBOPLASTIN TIME PARTIAL: CPT | Performed by: INTERNAL MEDICINE

## 2025-06-06 PROCEDURE — 85730 THROMBOPLASTIN TIME PARTIAL: CPT | Performed by: EMERGENCY MEDICINE

## 2025-06-06 PROCEDURE — 36415 COLL VENOUS BLD VENIPUNCTURE: CPT | Performed by: EMERGENCY MEDICINE

## 2025-06-06 PROCEDURE — 99223 1ST HOSP IP/OBS HIGH 75: CPT | Performed by: INTERNAL MEDICINE

## 2025-06-06 PROCEDURE — 84100 ASSAY OF PHOSPHORUS: CPT | Performed by: INTERNAL MEDICINE

## 2025-06-06 PROCEDURE — 84484 ASSAY OF TROPONIN QUANT: CPT | Performed by: INTERNAL MEDICINE

## 2025-06-06 PROCEDURE — 99285 EMERGENCY DEPT VISIT HI MDM: CPT

## 2025-06-06 RX ORDER — SPIRONOLACTONE 25 MG/1
12.5 TABLET ORAL DAILY
Status: DISCONTINUED | OUTPATIENT
Start: 2025-06-07 | End: 2025-06-08 | Stop reason: HOSPADM

## 2025-06-06 RX ORDER — GADOBUTROL 604.72 MG/ML
7 INJECTION INTRAVENOUS
Status: COMPLETED | OUTPATIENT
Start: 2025-06-06 | End: 2025-06-06

## 2025-06-06 RX ORDER — ONDANSETRON 2 MG/ML
4 INJECTION INTRAMUSCULAR; INTRAVENOUS EVERY 6 HOURS PRN
Status: DISCONTINUED | OUTPATIENT
Start: 2025-06-06 | End: 2025-06-08 | Stop reason: HOSPADM

## 2025-06-06 RX ORDER — NICOTINE 21 MG/24HR
1 PATCH, TRANSDERMAL 24 HOURS TRANSDERMAL DAILY
Status: DISCONTINUED | OUTPATIENT
Start: 2025-06-07 | End: 2025-06-08 | Stop reason: HOSPADM

## 2025-06-06 RX ORDER — ASPIRIN 81 MG/1
81 TABLET, CHEWABLE ORAL DAILY
Status: DISCONTINUED | OUTPATIENT
Start: 2025-06-07 | End: 2025-06-08 | Stop reason: HOSPADM

## 2025-06-06 RX ORDER — LOSARTAN POTASSIUM 25 MG/1
25 TABLET ORAL
Status: DISCONTINUED | OUTPATIENT
Start: 2025-06-06 | End: 2025-06-08 | Stop reason: HOSPADM

## 2025-06-06 RX ORDER — SPIRONOLACTONE AND HYDROCHLOROTHIAZIDE 25; 25 MG/1; MG/1
0.5 TABLET ORAL DAILY
Status: DISCONTINUED | OUTPATIENT
Start: 2025-06-07 | End: 2025-06-06

## 2025-06-06 RX ORDER — AMLODIPINE BESYLATE 5 MG/1
5 TABLET ORAL EVERY MORNING
Status: DISCONTINUED | OUTPATIENT
Start: 2025-06-07 | End: 2025-06-08 | Stop reason: HOSPADM

## 2025-06-06 RX ORDER — HYDROCHLOROTHIAZIDE 12.5 MG/1
12.5 TABLET ORAL DAILY
Status: DISCONTINUED | OUTPATIENT
Start: 2025-06-07 | End: 2025-06-08 | Stop reason: HOSPADM

## 2025-06-06 RX ORDER — EZETIMIBE 10 MG/1
10 TABLET ORAL DAILY
Status: DISCONTINUED | OUTPATIENT
Start: 2025-06-07 | End: 2025-06-08 | Stop reason: HOSPADM

## 2025-06-06 RX ORDER — SODIUM CHLORIDE, SODIUM LACTATE, POTASSIUM CHLORIDE, CALCIUM CHLORIDE 600; 310; 30; 20 MG/100ML; MG/100ML; MG/100ML; MG/100ML
200 INJECTION, SOLUTION INTRAVENOUS CONTINUOUS
Status: DISCONTINUED | OUTPATIENT
Start: 2025-06-06 | End: 2025-06-08

## 2025-06-06 RX ORDER — CARVEDILOL 12.5 MG/1
12.5 TABLET ORAL 2 TIMES DAILY WITH MEALS
Status: DISCONTINUED | OUTPATIENT
Start: 2025-06-06 | End: 2025-06-08 | Stop reason: HOSPADM

## 2025-06-06 RX ORDER — CILOSTAZOL 50 MG/1
50 TABLET ORAL 2 TIMES DAILY
Status: DISCONTINUED | OUTPATIENT
Start: 2025-06-06 | End: 2025-06-08 | Stop reason: HOSPADM

## 2025-06-06 RX ORDER — ACETAMINOPHEN 325 MG/1
650 TABLET ORAL EVERY 6 HOURS PRN
Status: DISCONTINUED | OUTPATIENT
Start: 2025-06-06 | End: 2025-06-08 | Stop reason: HOSPADM

## 2025-06-06 RX ORDER — ESCITALOPRAM OXALATE 10 MG/1
20 TABLET ORAL DAILY
Status: DISCONTINUED | OUTPATIENT
Start: 2025-06-07 | End: 2025-06-08 | Stop reason: HOSPADM

## 2025-06-06 RX ADMIN — IOHEXOL 100 ML: 350 INJECTION, SOLUTION INTRAVENOUS at 17:03

## 2025-06-06 RX ADMIN — LOSARTAN POTASSIUM 25 MG: 25 TABLET, FILM COATED ORAL at 21:42

## 2025-06-06 RX ADMIN — CARVEDILOL 12.5 MG: 12.5 TABLET, FILM COATED ORAL at 18:41

## 2025-06-06 RX ADMIN — MORPHINE SULFATE 2 MG: 2 INJECTION, SOLUTION INTRAMUSCULAR; INTRAVENOUS at 18:45

## 2025-06-06 RX ADMIN — SODIUM CHLORIDE, SODIUM LACTATE, POTASSIUM CHLORIDE, AND CALCIUM CHLORIDE 200 ML/HR: .6; .31; .03; .02 INJECTION, SOLUTION INTRAVENOUS at 18:42

## 2025-06-06 RX ADMIN — SODIUM CHLORIDE 1000 ML: 0.9 INJECTION, SOLUTION INTRAVENOUS at 16:20

## 2025-06-06 RX ADMIN — GADOBUTROL 7 ML: 604.72 INJECTION INTRAVENOUS at 22:27

## 2025-06-06 RX ADMIN — CILOSTAZOL 50 MG: 50 TABLET ORAL at 18:41

## 2025-06-06 NOTE — ASSESSMENT & PLAN NOTE
Patient presents with abdominal pain for 2 days   history of heavy alcohol use for the past 20 years  Lipase 1510  CT abdomen pelvis-no acute inflammation or infectious process.  New pancreatic head cystic lesion measuring 2.1 x 3 cm with stable renal cysts  .  Patient will be on LR at 200 mL/h  Symptomatic treatment with morphine sulfate for pain control and Zofran  Continue n.p.o.  Order MRI/MRCP as patient not have cystic lesion in the pancreatic head  GI consultation

## 2025-06-06 NOTE — ASSESSMENT & PLAN NOTE
Sodium level mildly low at 131  Likely secondary dehydration in the setting of poor oral intake and suspected beer potomania  IV hydration with LR with a follow-up BMP
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
## 2025-06-06 NOTE — ASSESSMENT & PLAN NOTE
History of heavy alcohol use and drinks 7 beers on a daily basis for the past 20 years  Patient will be on CIWA protocol  Continue thiamine, multivitamin and folic acid  Monitor closely for signs of withdrawal

## 2025-06-06 NOTE — PLAN OF CARE
Problem: PAIN - ADULT  Goal: Verbalizes/displays adequate comfort level or baseline comfort level  Description: Interventions:  - Encourage patient to monitor pain and request assistance  - Assess pain using appropriate pain scale  - Administer analgesics as ordered based on type and severity of pain and evaluate response  - Implement non-pharmacological measures as appropriate and evaluate response  - Consider cultural and social influences on pain and pain management  - Notify physician/advanced practitioner if interventions unsuccessful or patient reports new pain  - Educate patient/family on pain management process including their role and importance of  reporting pain   - Provide non-pharmacologic/complimentary pain relief interventions  Outcome: Progressing     Problem: INFECTION - ADULT  Goal: Absence or prevention of progression during hospitalization  Description: INTERVENTIONS:  - Assess and monitor for signs and symptoms of infection  - Monitor lab/diagnostic results  - Monitor all insertion sites, i.e. indwelling lines, tubes, and drains  - Monitor endotracheal if appropriate and nasal secretions for changes in amount and color  - [place] appropriate cooling/warming therapies per order  - Administer medications as ordered  - Instruct and encourage patient and family to use good hand hygiene technique  - Identify and instruct in appropriate isolation precautions for identified infection/condition  Outcome: Progressing  Goal: Absence of fever/infection during neutropenic period  Description: INTERVENTIONS:  - Monitor WBC  - Perform strict hand hygiene  - Limit to healthy visitors only  - No plants, dried, fresh or silk flowers with neal in patient room  Outcome: Progressing     Problem: SAFETY ADULT  Goal: Patient will remain free of falls  Description: INTERVENTIONS:  - Educate patient/family on patient safety including physical limitations  - Instruct patient to call for assistance with activity   -  Consider consulting OT/PT to assist with strengthening/mobility based on AM PAC & JH-HLM score  - Consult OT/PT to assist with strengthening/mobility   - Keep Call bell within reach  - Keep bed low and locked with side rails adjusted as appropriate  - Keep care items and personal belongings within reach  - Initiate and maintain comfort rounds  - Make Fall Risk Sign visible to staff  - Offer Toileting every 2 Hours, in advance of need  - Initiate/Maintain bed alarm  - Obtain necessary fall risk management equipment: slipper socks  - Apply yellow socks and bracelet for high fall risk patients  - Consider moving patient to room near nurses station  Outcome: Progressing  Goal: Maintain or return to baseline ADL function  Description: INTERVENTIONS:  -  Assess patient's ability to carry out ADLs; assess patient's baseline for ADL function and identify physical deficits which impact ability to perform ADLs (bathing, care of mouth/teeth, toileting, grooming, dressing, etc.)  - Assess/evaluate cause of self-care deficits   - Assess range of motion  - Assess patient's mobility; develop plan if impaired  - Assess patient's need for assistive devices and provide as appropriate  - Encourage maximum independence but intervene and supervise when necessary  - Involve family in performance of ADLs  - Assess for home care needs following discharge   - Consider OT consult to assist with ADL evaluation and planning for discharge  - Provide patient education as appropriate  - Monitor functional capacity and physical performance, use of AM PAC & JH-HLM   - Monitor gait, balance and fatigue with ambulation    Outcome: Progressing  Goal: Maintains/Returns to pre admission functional level  Description: INTERVENTIONS:  - Perform AM-PAC 6 Click Basic Mobility/ Daily Activity assessment daily.  - Set and communicate daily mobility goal to care team and patient/family/caregiver.   - Collaborate with rehabilitation services on mobility goals  if consulted  - Perform Range of Motion 3 times a day.  - Reposition patient every 2 hours.  - Dangle patient 3 times a day  - Stand patient 3 times a day  - Ambulate patient 3 times a day  - Out of bed to chair 3 times a day   - Out of bed for meals 3 times a day  - Out of bed for toileting  - Record patient progress and toleration of activity level   Outcome: Progressing     Problem: DISCHARGE PLANNING  Goal: Discharge to home or other facility with appropriate resources  Description: INTERVENTIONS:  - Identify barriers to discharge w/patient and caregiver  - Arrange for needed discharge resources and transportation as appropriate  - Identify discharge learning needs (meds, wound care, etc.)  - Arrange for interpretive services to assist at discharge as needed  - Refer to Case Management Department for coordinating discharge planning if the patient needs post-hospital services based on physician/advanced practitioner order or complex needs related to functional status, cognitive ability, or social support system  Outcome: Progressing     Problem: Knowledge Deficit  Goal: Patient/family/caregiver demonstrates understanding of disease process, treatment plan, medications, and discharge instructions  Description: Complete learning assessment and assess knowledge base.  Interventions:  - Provide teaching at level of understanding  - Provide teaching via preferred learning methods  Outcome: Progressing     Problem: GASTROINTESTINAL - ADULT  Goal: Minimal or absence of nausea and/or vomiting  Description: INTERVENTIONS:  - Administer IV fluids if ordered to ensure adequate hydration  - Maintain NPO status until nausea and vomiting are resolved  - Nasogastric tube if ordered  - Administer ordered antiemetic medications as needed  - Provide nonpharmacologic comfort measures as appropriate  - Advance diet as tolerated, if ordered  - Consider nutrition services referral to assist patient with adequate nutrition and  appropriate food choices  Outcome: Progressing  Goal: Maintains or returns to baseline bowel function  Description: INTERVENTIONS:  - Assess bowel function  - Encourage oral fluids to ensure adequate hydration  - Administer IV fluids if ordered to ensure adequate hydration  - Administer ordered medications as needed  - Encourage mobilization and activity  - Consider nutritional services referral to assist patient with adequate nutrition and appropriate food choices  Outcome: Progressing  Goal: Maintains adequate nutritional intake  Description: INTERVENTIONS:  - Monitor percentage of each meal consumed  - Identify factors contributing to decreased intake, treat as appropriate  - Assist with meals as needed  - Monitor I&O, weight, and lab values if indicated  - Obtain nutrition services referral as needed  Outcome: Progressing     Problem: METABOLIC, FLUID AND ELECTROLYTES - ADULT  Goal: Electrolytes maintained within normal limits  Description: INTERVENTIONS:  - Monitor labs and assess patient for signs and symptoms of electrolyte imbalances  - Administer electrolyte replacement as ordered  - Monitor response to electrolyte replacements, including repeat lab results as appropriate  - Instruct patient on fluid and nutrition as appropriate  Outcome: Progressing  Goal: Fluid balance maintained  Description: INTERVENTIONS:  - Monitor labs   - Monitor I/O and WT  - Instruct patient on fluid and nutrition as appropriate  - Assess for signs & symptoms of volume excess or deficit  Outcome: Progressing
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
## 2025-06-07 PROBLEM — N28.1 BILATERAL RENAL CYSTS: Status: ACTIVE | Noted: 2025-06-07

## 2025-06-07 PROBLEM — E87.8 ELECTROLYTE ABNORMALITY: Status: ACTIVE | Noted: 2025-06-07

## 2025-06-07 LAB
ALBUMIN SERPL BCG-MCNC: 3 G/DL (ref 3.5–5)
ALP SERPL-CCNC: 58 U/L (ref 34–104)
ALT SERPL W P-5'-P-CCNC: 19 U/L (ref 7–52)
ANION GAP SERPL CALCULATED.3IONS-SCNC: 7 MMOL/L (ref 4–13)
AST SERPL W P-5'-P-CCNC: 22 U/L (ref 13–39)
BASOPHILS # BLD AUTO: 0.08 THOUSANDS/ÂΜL (ref 0–0.1)
BASOPHILS NFR BLD AUTO: 1 % (ref 0–1)
BILIRUB SERPL-MCNC: 0.88 MG/DL (ref 0.2–1)
BUN SERPL-MCNC: 13 MG/DL (ref 5–25)
CALCIUM ALBUM COR SERPL-MCNC: 9 MG/DL (ref 8.3–10.1)
CALCIUM SERPL-MCNC: 8.2 MG/DL (ref 8.4–10.2)
CHLORIDE SERPL-SCNC: 102 MMOL/L (ref 96–108)
CO2 SERPL-SCNC: 23 MMOL/L (ref 21–32)
CREAT SERPL-MCNC: 0.89 MG/DL (ref 0.6–1.3)
EOSINOPHIL # BLD AUTO: 0.29 THOUSAND/ÂΜL (ref 0–0.61)
EOSINOPHIL NFR BLD AUTO: 3 % (ref 0–6)
ERYTHROCYTE [DISTWIDTH] IN BLOOD BY AUTOMATED COUNT: 12.6 % (ref 11.6–15.1)
GFR SERPL CREATININE-BSD FRML MDRD: 89 ML/MIN/1.73SQ M
GLUCOSE SERPL-MCNC: 74 MG/DL (ref 65–140)
HCT VFR BLD AUTO: 45 % (ref 36.5–49.3)
HGB BLD-MCNC: 16 G/DL (ref 12–17)
IMM GRANULOCYTES # BLD AUTO: 0.03 THOUSAND/UL (ref 0–0.2)
IMM GRANULOCYTES NFR BLD AUTO: 0 % (ref 0–2)
LIPASE SERPL-CCNC: 596 U/L (ref 11–82)
LYMPHOCYTES # BLD AUTO: 2.63 THOUSANDS/ÂΜL (ref 0.6–4.47)
LYMPHOCYTES NFR BLD AUTO: 29 % (ref 14–44)
MAGNESIUM SERPL-MCNC: 1.8 MG/DL (ref 1.9–2.7)
MCH RBC QN AUTO: 35.2 PG (ref 26.8–34.3)
MCHC RBC AUTO-ENTMCNC: 35.6 G/DL (ref 31.4–37.4)
MCV RBC AUTO: 99 FL (ref 82–98)
MONOCYTES # BLD AUTO: 1 THOUSAND/ÂΜL (ref 0.17–1.22)
MONOCYTES NFR BLD AUTO: 11 % (ref 4–12)
NEUTROPHILS # BLD AUTO: 5.2 THOUSANDS/ÂΜL (ref 1.85–7.62)
NEUTS SEG NFR BLD AUTO: 56 % (ref 43–75)
NRBC BLD AUTO-RTO: 0 /100 WBCS
PHOSPHATE SERPL-MCNC: 3.1 MG/DL (ref 2.3–4.1)
PLATELET # BLD AUTO: 221 THOUSANDS/UL (ref 149–390)
PMV BLD AUTO: 9.4 FL (ref 8.9–12.7)
POTASSIUM SERPL-SCNC: 3.5 MMOL/L (ref 3.5–5.3)
PROT SERPL-MCNC: 6.2 G/DL (ref 6.4–8.4)
RBC # BLD AUTO: 4.54 MILLION/UL (ref 3.88–5.62)
SODIUM SERPL-SCNC: 132 MMOL/L (ref 135–147)
WBC # BLD AUTO: 9.23 THOUSAND/UL (ref 4.31–10.16)

## 2025-06-07 PROCEDURE — 80053 COMPREHEN METABOLIC PANEL: CPT | Performed by: INTERNAL MEDICINE

## 2025-06-07 PROCEDURE — 83690 ASSAY OF LIPASE: CPT | Performed by: INTERNAL MEDICINE

## 2025-06-07 PROCEDURE — 84100 ASSAY OF PHOSPHORUS: CPT | Performed by: INTERNAL MEDICINE

## 2025-06-07 PROCEDURE — 85025 COMPLETE CBC W/AUTO DIFF WBC: CPT | Performed by: INTERNAL MEDICINE

## 2025-06-07 PROCEDURE — 99232 SBSQ HOSP IP/OBS MODERATE 35: CPT | Performed by: INTERNAL MEDICINE

## 2025-06-07 PROCEDURE — 83735 ASSAY OF MAGNESIUM: CPT | Performed by: INTERNAL MEDICINE

## 2025-06-07 RX ORDER — MAGNESIUM SULFATE HEPTAHYDRATE 40 MG/ML
2 INJECTION, SOLUTION INTRAVENOUS ONCE
Status: COMPLETED | OUTPATIENT
Start: 2025-06-07 | End: 2025-06-07

## 2025-06-07 RX ORDER — POTASSIUM CHLORIDE 1500 MG/1
40 TABLET, EXTENDED RELEASE ORAL ONCE
Status: COMPLETED | OUTPATIENT
Start: 2025-06-07 | End: 2025-06-07

## 2025-06-07 RX ADMIN — LOSARTAN POTASSIUM 25 MG: 25 TABLET, FILM COATED ORAL at 21:11

## 2025-06-07 RX ADMIN — ACETAMINOPHEN 650 MG: 325 TABLET, FILM COATED ORAL at 22:30

## 2025-06-07 RX ADMIN — CILOSTAZOL 50 MG: 50 TABLET ORAL at 09:18

## 2025-06-07 RX ADMIN — EZETIMIBE 10 MG: 10 TABLET ORAL at 09:15

## 2025-06-07 RX ADMIN — ASPIRIN 81 MG: 81 TABLET, CHEWABLE ORAL at 09:15

## 2025-06-07 RX ADMIN — SODIUM CHLORIDE, SODIUM LACTATE, POTASSIUM CHLORIDE, AND CALCIUM CHLORIDE 200 ML/HR: .6; .31; .03; .02 INJECTION, SOLUTION INTRAVENOUS at 23:22

## 2025-06-07 RX ADMIN — POTASSIUM CHLORIDE 40 MEQ: 1500 TABLET, EXTENDED RELEASE ORAL at 14:03

## 2025-06-07 RX ADMIN — MORPHINE SULFATE 2 MG: 2 INJECTION, SOLUTION INTRAMUSCULAR; INTRAVENOUS at 14:02

## 2025-06-07 RX ADMIN — ESCITALOPRAM OXALATE 20 MG: 10 TABLET ORAL at 09:15

## 2025-06-07 RX ADMIN — SPIRONOLACTONE 12.5 MG: 25 TABLET ORAL at 09:15

## 2025-06-07 RX ADMIN — SODIUM CHLORIDE, SODIUM LACTATE, POTASSIUM CHLORIDE, AND CALCIUM CHLORIDE 200 ML/HR: .6; .31; .03; .02 INJECTION, SOLUTION INTRAVENOUS at 17:07

## 2025-06-07 RX ADMIN — CILOSTAZOL 50 MG: 50 TABLET ORAL at 17:03

## 2025-06-07 RX ADMIN — CARVEDILOL 12.5 MG: 12.5 TABLET, FILM COATED ORAL at 17:03

## 2025-06-07 RX ADMIN — AMLODIPINE BESYLATE 5 MG: 5 TABLET ORAL at 09:15

## 2025-06-07 RX ADMIN — MAGNESIUM SULFATE HEPTAHYDRATE 2 G: 40 INJECTION, SOLUTION INTRAVENOUS at 09:14

## 2025-06-07 RX ADMIN — SODIUM CHLORIDE, SODIUM LACTATE, POTASSIUM CHLORIDE, AND CALCIUM CHLORIDE 200 ML/HR: .6; .31; .03; .02 INJECTION, SOLUTION INTRAVENOUS at 00:43

## 2025-06-07 RX ADMIN — SODIUM CHLORIDE, SODIUM LACTATE, POTASSIUM CHLORIDE, AND CALCIUM CHLORIDE 200 ML/HR: .6; .31; .03; .02 INJECTION, SOLUTION INTRAVENOUS at 07:31

## 2025-06-07 RX ADMIN — HYDROCHLOROTHIAZIDE 12.5 MG: 12.5 TABLET ORAL at 09:15

## 2025-06-07 RX ADMIN — CARVEDILOL 12.5 MG: 12.5 TABLET, FILM COATED ORAL at 09:15

## 2025-06-07 RX ADMIN — SODIUM CHLORIDE, SODIUM LACTATE, POTASSIUM CHLORIDE, AND CALCIUM CHLORIDE 200 ML/HR: .6; .31; .03; .02 INJECTION, SOLUTION INTRAVENOUS at 12:29

## 2025-06-07 RX ADMIN — Medication 3 MG: at 22:30

## 2025-06-07 NOTE — PLAN OF CARE
Problem: PAIN - ADULT  Goal: Verbalizes/displays adequate comfort level or baseline comfort level  Description: Interventions:  - Encourage patient to monitor pain and request assistance  - Assess pain using appropriate pain scale  - Administer analgesics as ordered based on type and severity of pain and evaluate response  - Implement non-pharmacological measures as appropriate and evaluate response  - Consider cultural and social influences on pain and pain management  - Notify physician/advanced practitioner if interventions unsuccessful or patient reports new pain  - Educate patient/family on pain management process including their role and importance of  reporting pain   - Provide non-pharmacologic/complimentary pain relief interventions  Outcome: Progressing     Problem: INFECTION - ADULT  Goal: Absence or prevention of progression during hospitalization  Description: INTERVENTIONS:  - Assess and monitor for signs and symptoms of infection  - Monitor lab/diagnostic results  - Monitor all insertion sites, i.e. indwelling lines, tubes, and drains  - Monitor endotracheal if appropriate and nasal secretions for changes in amount and color  - [place] appropriate cooling/warming therapies per order  - Administer medications as ordered  - Instruct and encourage patient and family to use good hand hygiene technique  - Identify and instruct in appropriate isolation precautions for identified infection/condition  Outcome: Progressing  Goal: Absence of fever/infection during neutropenic period  Description: INTERVENTIONS:  - Monitor WBC  - Perform strict hand hygiene  - Limit to healthy visitors only  - No plants, dried, fresh or silk flowers with neal in patient room  Outcome: Progressing     Problem: SAFETY ADULT  Goal: Patient will remain free of falls  Description: INTERVENTIONS:  - Educate patient/family on patient safety including physical limitations  - Instruct patient to call for assistance with activity   -  Consider consulting OT/PT to assist with strengthening/mobility based on AM PAC & JH-HLM score  - Consult OT/PT to assist with strengthening/mobility   - Keep Call bell within reach  - Keep bed low and locked with side rails adjusted as appropriate  - Keep care items and personal belongings within reach  - Initiate and maintain comfort rounds  - Make Fall Risk Sign visible to staff  - Offer Toileting every 2 Hours, in advance of need  - Initiate/Maintain bed alarm  - Obtain necessary fall risk management equipment: slipper socks  - Apply yellow socks and bracelet for high fall risk patients  - Consider moving patient to room near nurses station  Outcome: Progressing  Goal: Maintain or return to baseline ADL function  Description: INTERVENTIONS:  -  Assess patient's ability to carry out ADLs; assess patient's baseline for ADL function and identify physical deficits which impact ability to perform ADLs (bathing, care of mouth/teeth, toileting, grooming, dressing, etc.)  - Assess/evaluate cause of self-care deficits   - Assess range of motion  - Assess patient's mobility; develop plan if impaired  - Assess patient's need for assistive devices and provide as appropriate  - Encourage maximum independence but intervene and supervise when necessary  - Involve family in performance of ADLs  - Assess for home care needs following discharge   - Consider OT consult to assist with ADL evaluation and planning for discharge  - Provide patient education as appropriate  - Monitor functional capacity and physical performance, use of AM PAC & JH-HLM   - Monitor gait, balance and fatigue with ambulation    Outcome: Progressing  Goal: Maintains/Returns to pre admission functional level  Description: INTERVENTIONS:  - Perform AM-PAC 6 Click Basic Mobility/ Daily Activity assessment daily.  - Set and communicate daily mobility goal to care team and patient/family/caregiver.   - Collaborate with rehabilitation services on mobility goals  if consulted  - Perform Range of Motion 3 times a day.  - Reposition patient every 2 hours.  - Dangle patient 3 times a day  - Stand patient 3 times a day  - Ambulate patient 3 times a day  - Out of bed to chair 3 times a day   - Out of bed for meals 3 times a day  - Out of bed for toileting  - Record patient progress and toleration of activity level   Outcome: Progressing     Problem: DISCHARGE PLANNING  Goal: Discharge to home or other facility with appropriate resources  Description: INTERVENTIONS:  - Identify barriers to discharge w/patient and caregiver  - Arrange for needed discharge resources and transportation as appropriate  - Identify discharge learning needs (meds, wound care, etc.)  - Arrange for interpretive services to assist at discharge as needed  - Refer to Case Management Department for coordinating discharge planning if the patient needs post-hospital services based on physician/advanced practitioner order or complex needs related to functional status, cognitive ability, or social support system  Outcome: Progressing     Problem: Knowledge Deficit  Goal: Patient/family/caregiver demonstrates understanding of disease process, treatment plan, medications, and discharge instructions  Description: Complete learning assessment and assess knowledge base.  Interventions:  - Provide teaching at level of understanding  - Provide teaching via preferred learning methods  Outcome: Progressing     Problem: GASTROINTESTINAL - ADULT  Goal: Minimal or absence of nausea and/or vomiting  Description: INTERVENTIONS:  - Administer IV fluids if ordered to ensure adequate hydration  - Maintain NPO status until nausea and vomiting are resolved  - Nasogastric tube if ordered  - Administer ordered antiemetic medications as needed  - Provide nonpharmacologic comfort measures as appropriate  - Advance diet as tolerated, if ordered  - Consider nutrition services referral to assist patient with adequate nutrition and  appropriate food choices  Outcome: Progressing  Goal: Maintains or returns to baseline bowel function  Description: INTERVENTIONS:  - Assess bowel function  - Encourage oral fluids to ensure adequate hydration  - Administer IV fluids if ordered to ensure adequate hydration  - Administer ordered medications as needed  - Encourage mobilization and activity  - Consider nutritional services referral to assist patient with adequate nutrition and appropriate food choices  Outcome: Progressing  Goal: Maintains adequate nutritional intake  Description: INTERVENTIONS:  - Monitor percentage of each meal consumed  - Identify factors contributing to decreased intake, treat as appropriate  - Assist with meals as needed  - Monitor I&O, weight, and lab values if indicated  - Obtain nutrition services referral as needed  Outcome: Progressing     Problem: METABOLIC, FLUID AND ELECTROLYTES - ADULT  Goal: Electrolytes maintained within normal limits  Description: INTERVENTIONS:  - Monitor labs and assess patient for signs and symptoms of electrolyte imbalances  - Administer electrolyte replacement as ordered  - Monitor response to electrolyte replacements, including repeat lab results as appropriate  - Instruct patient on fluid and nutrition as appropriate  Outcome: Progressing  Goal: Fluid balance maintained  Description: INTERVENTIONS:  - Monitor labs   - Monitor I/O and WT  - Instruct patient on fluid and nutrition as appropriate  - Assess for signs & symptoms of volume excess or deficit  Outcome: Progressing

## 2025-06-07 NOTE — PROGRESS NOTES
Progress Note - Hospitalist   Name: Brent Chun 64 y.o. male I MRN: [id number]  Unit/Bed#: [address] [place] [address] Date of Admission: 6/6/2025   Date of Service: 6/7/2025 I Hospital Day: 1    Assessment & Plan  Alcohol-induced acute pancreatitis  Patient presents with abdominal pain for 2 days   history of heavy alcohol use for the past 20 years  Lipase [id number]  CT abdomen pelvis-no acute inflammation or infectious process.  New pancreatic head cystic lesion measuring 2.1 x 3 cm with stable renal cysts  Continue LR at 200 mL/h  Symptomatic treatment with morphine sulfate for pain control and Zofran  Patient has been n.p.o. but pain has improved and started on clear liquid diet  MRI MRCP with unilocular cyst in the neck of the pancreas measuring 2.7 cm  Alcoholism (HCC)  History of heavy alcohol use and drinks 7 beers on a daily basis for the past 20 years  Patient will be on CIWA protocol  Continue thiamine, multivitamin and folic acid  Monitor closely for signs of withdrawal  Hyponatremia  Sodium level mildly low at 131  Likely secondary dehydration in the setting of poor oral intake and suspected beer potomania  Improved with IV hydration  Hyperlipidemia  Continue statin  Hypertension  Known history of hypertension  Continue Coreg 12.5 mg p.o. twice daily, Aldactazide and losartan  Atherosclerosis of native artery of both lower extremities with intermittent claudication (HCC)  Continue Zetia, aspirin and Pletal  Tobacco abuse  Smokes 1 pack/day for 40 to 50 years  Nicotine patch 14 mg daily ordered  Bilateral renal cysts  Bilateral simple and hemorrhagic renal cyst noted  Electrolyte abnormality  Potassium 3.5 and magnesium 1.8 which were repleted    VTE Pharmacologic Prophylaxis: VTE Score: 2 Low Risk (Score 0-2) - Encourage Ambulation.    Mobility:   Basic Mobility Inpatient Raw Score: 23  JH-HLM Goal: 7: Walk 25 feet or more  JH-HLM Achieved: 7: Walk 25 feet or more  JH-HLM Goal achieved. Continue to encourage  appropriate mobility.    Patient Centered Rounds: I performed bedside rounds with nursing staff today.   Discussions with Specialists or Other Care Team Provider: yes    Education and Discussions with Family / Patient: yes    Current Length of Stay: 1 day(s)  Current Patient Status: Inpatient   Certification Statement: The patient will continue to require additional inpatient hospital stay due to acute pancreatitis  Discharge Plan: Anticipate discharge in 24-48 hrs to home.    Code Status: Level 1 - Full Code    Subjective   Patient has some mild lower abdominal discomfort but denies any epigastric pain    Objective :  Temp:  [97.2 °F (36.2 °C)-100.1 °F (37.8 °C)] 97.7 °F (36.5 °C)  HR:  [61-74] 70  BP: (137-185)/(68-99) 148/71  Resp:  [18-23] 20  SpO2:  [94 %-98 %] 96 %  O2 Device: None (Room air)    Body mass index is 23.14 kg/m².     Input and Output Summary (last 24 hours):     Intake/Output Summary (Last 24 hours) at 6/7/2025 1304  Last data filed at 6/7/2025 1101  Gross per 24 hour   Intake 2563.33 ml   Output 800 ml   Net 1763.33 ml       Physical Exam  Constitutional:       Appearance: Normal appearance.   HENT:      Head: Normocephalic and atraumatic.     Eyes:      Extraocular Movements: Extraocular movements intact.      Pupils: Pupils are equal, round, and reactive to light.       Cardiovascular:      Rate and Rhythm: Normal rate and regular rhythm.      Heart sounds: No murmur heard.     No gallop.   Pulmonary:      Effort: Pulmonary effort is normal.      Breath sounds: Normal breath sounds.   Abdominal:      General: Bowel sounds are normal.      Palpations: Abdomen is soft.      Tenderness: There is no abdominal tenderness.     Musculoskeletal:         General: No swelling or deformity. Normal range of motion.      Cervical back: Normal range of motion and neck supple.     Skin:     General: Skin is warm and dry.     Neurological:      General: No focal deficit present.      Mental Status: He is  alert.      Comments: Minimal shakiness       Lines/Drains:              Lab Results: I have reviewed the following results:   Results from last 7 days   Lab Units 06/07/25  0435   WBC Thousand/uL 9.23   HEMOGLOBIN g/dL 16.0   HEMATOCRIT % 45.0   PLATELETS Thousands/uL 221   SEGS PCT % 56   LYMPHO PCT % 29   MONO PCT % 11   EOS PCT % 3     Results from last 7 days   Lab Units 06/07/25  0435   SODIUM mmol/L 132*   POTASSIUM mmol/L 3.5   CHLORIDE mmol/L 102   CO2 mmol/L 23   BUN mg/dL 13   CREATININE mg/dL 0.89   ANION GAP mmol/L 7   CALCIUM mg/dL 8.2*   ALBUMIN g/dL 3.0*   TOTAL BILIRUBIN mg/dL 0.88   ALK PHOS U/L 58   ALT U/L 19   AST U/L 22   GLUCOSE RANDOM mg/dL 74     Results from last 7 days   Lab Units 06/06/25  1941   INR  0.89             Results from last 7 days   Lab Units 06/06/25 1941   LACTIC ACID mmol/L 0.8       Recent Cultures (last 7 days):         Imaging Results Review: I reviewed radiology reports from this admission including: CT abdomen/pelvis and MRI abdomen/MRCP.  Other Study Results Review: EKG was reviewed.     Last 24 Hours Medication List:     Current Facility-Administered Medications:     acetaminophen (TYLENOL) tablet 650 mg, Q6H PRN    amLODIPine (NORVASC) tablet 5 mg, QAM    aspirin chewable tablet 81 mg, Daily    carvedilol (COREG) tablet 12.5 mg, BID With Meals    cilostazol (PLETAL) tablet 50 mg, BID    escitalopram (LEXAPRO) tablet 20 mg, Daily    ezetimibe (ZETIA) tablet 10 mg, Daily    spironolactone (ALDACTONE) tablet 12.5 mg, Daily **AND** hydroCHLOROthiazide tablet 12.5 mg, Daily    lactated ringers infusion, Continuous, Last Rate: 200 mL/hr (06/07/25 1229)    losartan (COZAAR) tablet 25 mg, HS    morphine injection 2 mg, Q4H PRN    nicotine (NICODERM CQ) 14 mg/24hr TD 24 hr patch 1 patch, Daily    ondansetron (ZOFRAN) injection 4 mg, Q6H PRN    Administrative Statements   Today, Patient Was Seen By: Vincenzo Vega MD      **Please Note: This note may have been  constructed using a voice recognition system.**

## 2025-06-07 NOTE — ASSESSMENT & PLAN NOTE
Sodium level mildly low at 131  Likely secondary dehydration in the setting of poor oral intake and suspected beer potomania  Improved with IV hydration

## 2025-06-07 NOTE — PLAN OF CARE
Problem: PAIN - ADULT  Goal: Verbalizes/displays adequate comfort level or baseline comfort level  Description: Interventions:  - Encourage patient to monitor pain and request assistance  - Assess pain using appropriate pain scale  - Administer analgesics as ordered based on type and severity of pain and evaluate response  - Implement non-pharmacological measures as appropriate and evaluate response  - Consider cultural and social influences on pain and pain management  - Notify physician/advanced practitioner if interventions unsuccessful or patient reports new pain  - Educate patient/family on pain management process including their role and importance of  reporting pain   - Provide non-pharmacologic/complimentary pain relief interventions  Outcome: Progressing     Problem: INFECTION - ADULT  Goal: Absence or prevention of progression during hospitalization  Description: INTERVENTIONS:  - Assess and monitor for signs and symptoms of infection  - Monitor lab/diagnostic results  - Monitor all insertion sites, i.e. indwelling lines, tubes, and drains  - Monitor endotracheal if appropriate and nasal secretions for changes in amount and color  - Achille appropriate cooling/warming therapies per order  - Administer medications as ordered  - Instruct and encourage patient and family to use good hand hygiene technique  - Identify and instruct in appropriate isolation precautions for identified infection/condition  Outcome: Progressing  Goal: Absence of fever/infection during neutropenic period  Description: INTERVENTIONS:  - Monitor WBC  - Perform strict hand hygiene  - Limit to healthy visitors only  - No plants, dried, fresh or silk flowers with neal in patient room  Outcome: Progressing     Problem: SAFETY ADULT  Goal: Patient will remain free of falls  Description: INTERVENTIONS:  - Educate patient/family on patient safety including physical limitations  - Instruct patient to call for assistance with activity   -  Consider consulting OT/PT to assist with strengthening/mobility based on AM PAC & JH-HLM score  - Consult OT/PT to assist with strengthening/mobility   - Keep Call bell within reach  - Keep bed low and locked with side rails adjusted as appropriate  - Keep care items and personal belongings within reach  - Initiate and maintain comfort rounds  - Make Fall Risk Sign visible to staff  - Offer Toileting every 2 Hours, in advance of need  - Initiate/Maintain bed alarm  - Obtain necessary fall risk management equipment: slipper socks  - Apply yellow socks and bracelet for high fall risk patients  - Consider moving patient to room near nurses station  Outcome: Progressing  Goal: Maintain or return to baseline ADL function  Description: INTERVENTIONS:  -  Assess patient's ability to carry out ADLs; assess patient's baseline for ADL function and identify physical deficits which impact ability to perform ADLs (bathing, care of mouth/teeth, toileting, grooming, dressing, etc.)  - Assess/evaluate cause of self-care deficits   - Assess range of motion  - Assess patient's mobility; develop plan if impaired  - Assess patient's need for assistive devices and provide as appropriate  - Encourage maximum independence but intervene and supervise when necessary  - Involve family in performance of ADLs  - Assess for home care needs following discharge   - Consider OT consult to assist with ADL evaluation and planning for discharge  - Provide patient education as appropriate  - Monitor functional capacity and physical performance, use of AM PAC & JH-HLM   - Monitor gait, balance and fatigue with ambulation    Outcome: Progressing  Goal: Maintains/Returns to pre admission functional level  Description: INTERVENTIONS:  - Perform AM-PAC 6 Click Basic Mobility/ Daily Activity assessment daily.  - Set and communicate daily mobility goal to care team and patient/family/caregiver.   - Collaborate with rehabilitation services on mobility goals  if consulted  - Perform Range of Motion 3 times a day.  - Reposition patient every 2 hours.  - Dangle patient 3 times a day  - Stand patient 3 times a day  - Ambulate patient 3 times a day  - Out of bed to chair 3 times a day   - Out of bed for meals 3 times a day  - Out of bed for toileting  - Record patient progress and toleration of activity level   Outcome: Progressing     Problem: DISCHARGE PLANNING  Goal: Discharge to home or other facility with appropriate resources  Description: INTERVENTIONS:  - Identify barriers to discharge w/patient and caregiver  - Arrange for needed discharge resources and transportation as appropriate  - Identify discharge learning needs (meds, wound care, etc.)  - Arrange for interpretive services to assist at discharge as needed  - Refer to Case Management Department for coordinating discharge planning if the patient needs post-hospital services based on physician/advanced practitioner order or complex needs related to functional status, cognitive ability, or social support system  Outcome: Progressing     Problem: Knowledge Deficit  Goal: Patient/family/caregiver demonstrates understanding of disease process, treatment plan, medications, and discharge instructions  Description: Complete learning assessment and assess knowledge base.  Interventions:  - Provide teaching at level of understanding  - Provide teaching via preferred learning methods  Outcome: Progressing     Problem: GASTROINTESTINAL - ADULT  Goal: Minimal or absence of nausea and/or vomiting  Description: INTERVENTIONS:  - Administer IV fluids if ordered to ensure adequate hydration  - Maintain NPO status until nausea and vomiting are resolved  - Nasogastric tube if ordered  - Administer ordered antiemetic medications as needed  - Provide nonpharmacologic comfort measures as appropriate  - Advance diet as tolerated, if ordered  - Consider nutrition services referral to assist patient with adequate nutrition and  appropriate food choices  Outcome: Progressing  Goal: Maintains or returns to baseline bowel function  Description: INTERVENTIONS:  - Assess bowel function  - Encourage oral fluids to ensure adequate hydration  - Administer IV fluids if ordered to ensure adequate hydration  - Administer ordered medications as needed  - Encourage mobilization and activity  - Consider nutritional services referral to assist patient with adequate nutrition and appropriate food choices  Outcome: Progressing  Goal: Maintains adequate nutritional intake  Description: INTERVENTIONS:  - Monitor percentage of each meal consumed  - Identify factors contributing to decreased intake, treat as appropriate  - Assist with meals as needed  - Monitor I&O, weight, and lab values if indicated  - Obtain nutrition services referral as needed  Outcome: Progressing     Problem: METABOLIC, FLUID AND ELECTROLYTES - ADULT  Goal: Electrolytes maintained within normal limits  Description: INTERVENTIONS:  - Monitor labs and assess patient for signs and symptoms of electrolyte imbalances  - Administer electrolyte replacement as ordered  - Monitor response to electrolyte replacements, including repeat lab results as appropriate  - Instruct patient on fluid and nutrition as appropriate  Outcome: Progressing  Goal: Fluid balance maintained  Description: INTERVENTIONS:  - Monitor labs   - Monitor I/O and WT  - Instruct patient on fluid and nutrition as appropriate  - Assess for signs & symptoms of volume excess or deficit  Outcome: Progressing
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
## 2025-06-07 NOTE — ASSESSMENT & PLAN NOTE
Patient presents with abdominal pain for 2 days   history of heavy alcohol use for the past 20 years  Lipase 1510-596  CT abdomen pelvis-no acute inflammation or infectious process.  New pancreatic head cystic lesion measuring 2.1 x 3 cm with stable renal cysts  Continue LR at 200 mL/h  Symptomatic treatment with morphine sulfate for pain control and Zofran  Patient has been n.p.o. but pain has improved and started on clear liquid diet  MRI MRCP with unilocular cyst in the neck of the pancreas measuring 2.7 cm

## 2025-06-08 VITALS
TEMPERATURE: 97.6 F | HEIGHT: 69 IN | RESPIRATION RATE: 14 BRPM | WEIGHT: 156.7 LBS | SYSTOLIC BLOOD PRESSURE: 147 MMHG | DIASTOLIC BLOOD PRESSURE: 70 MMHG | OXYGEN SATURATION: 97 % | BODY MASS INDEX: 23.21 KG/M2 | HEART RATE: 60 BPM

## 2025-06-08 LAB
ALBUMIN SERPL BCG-MCNC: 3 G/DL (ref 3.5–5)
ALP SERPL-CCNC: 55 U/L (ref 34–104)
ALT SERPL W P-5'-P-CCNC: 20 U/L (ref 7–52)
ANION GAP SERPL CALCULATED.3IONS-SCNC: 6 MMOL/L (ref 4–13)
AST SERPL W P-5'-P-CCNC: 27 U/L (ref 13–39)
ATRIAL RATE: 68 BPM
BILIRUB SERPL-MCNC: 0.9 MG/DL (ref 0.2–1)
BUN SERPL-MCNC: 8 MG/DL (ref 5–25)
CALCIUM ALBUM COR SERPL-MCNC: 9.4 MG/DL (ref 8.3–10.1)
CALCIUM SERPL-MCNC: 8.6 MG/DL (ref 8.4–10.2)
CHLORIDE SERPL-SCNC: 101 MMOL/L (ref 96–108)
CO2 SERPL-SCNC: 26 MMOL/L (ref 21–32)
CREAT SERPL-MCNC: 0.77 MG/DL (ref 0.6–1.3)
GFR SERPL CREATININE-BSD FRML MDRD: 95 ML/MIN/1.73SQ M
GLUCOSE SERPL-MCNC: 85 MG/DL (ref 65–140)
MAGNESIUM SERPL-MCNC: 1.9 MG/DL (ref 1.9–2.7)
P AXIS: 33 DEGREES
POTASSIUM SERPL-SCNC: 3.9 MMOL/L (ref 3.5–5.3)
PR INTERVAL: 156 MS
PROT SERPL-MCNC: 6.3 G/DL (ref 6.4–8.4)
QRS AXIS: 54 DEGREES
QRSD INTERVAL: 86 MS
QT INTERVAL: 406 MS
QTC INTERVAL: 431 MS
SODIUM SERPL-SCNC: 133 MMOL/L (ref 135–147)
T WAVE AXIS: 68 DEGREES
VENTRICULAR RATE: 68 BPM

## 2025-06-08 PROCEDURE — 93010 ELECTROCARDIOGRAM REPORT: CPT | Performed by: INTERNAL MEDICINE

## 2025-06-08 PROCEDURE — 99239 HOSP IP/OBS DSCHRG MGMT >30: CPT | Performed by: INTERNAL MEDICINE

## 2025-06-08 PROCEDURE — 80053 COMPREHEN METABOLIC PANEL: CPT | Performed by: INTERNAL MEDICINE

## 2025-06-08 PROCEDURE — 83735 ASSAY OF MAGNESIUM: CPT | Performed by: INTERNAL MEDICINE

## 2025-06-08 RX ADMIN — CARVEDILOL 12.5 MG: 12.5 TABLET, FILM COATED ORAL at 08:00

## 2025-06-08 RX ADMIN — ASPIRIN 81 MG: 81 TABLET, CHEWABLE ORAL at 08:00

## 2025-06-08 RX ADMIN — AMLODIPINE BESYLATE 5 MG: 5 TABLET ORAL at 08:00

## 2025-06-08 RX ADMIN — SPIRONOLACTONE 12.5 MG: 25 TABLET ORAL at 08:00

## 2025-06-08 RX ADMIN — SODIUM CHLORIDE, SODIUM LACTATE, POTASSIUM CHLORIDE, AND CALCIUM CHLORIDE 200 ML/HR: .6; .31; .03; .02 INJECTION, SOLUTION INTRAVENOUS at 05:02

## 2025-06-08 RX ADMIN — ESCITALOPRAM OXALATE 20 MG: 10 TABLET ORAL at 08:00

## 2025-06-08 RX ADMIN — EZETIMIBE 10 MG: 10 TABLET ORAL at 08:00

## 2025-06-08 RX ADMIN — HYDROCHLOROTHIAZIDE 12.5 MG: 12.5 TABLET ORAL at 08:00

## 2025-06-08 RX ADMIN — CILOSTAZOL 50 MG: 50 TABLET ORAL at 08:01

## 2025-06-08 NOTE — DISCHARGE INSTR - AVS FIRST PAGE
Abstinence from alcohol use  Follow-up with advanced GI at [facility] for EUS  Low-fat diet for a week followed by regular diet

## 2025-06-08 NOTE — ASSESSMENT & PLAN NOTE
Patient presents with abdominal pain for 2 days   history of heavy alcohol use for the past 20 years  Lipase 1510-596  CT abdomen pelvis-no acute inflammation or infectious process.  New pancreatic head cystic lesion measuring 2.1 x 3 cm with stable renal cysts  Patient received aggressive IV hydration with pain medications  Patient improved symptomatically and is tolerating clear liquid diet.  Patient advance to low-fat diet which she has been tolerating well  MRI MRCP with unilocular cyst in the neck of the pancreas measuring 2.7 cm  Outpatient follow-up with advanced GI at [place] for EUS to evaluate pancreatic cyst

## 2025-06-08 NOTE — ASSESSMENT & PLAN NOTE
Sodium level mildly low at 131  Likely secondary dehydration in the setting of poor oral intake and suspected beer potomania  Improved with IV hydration to 133

## 2025-06-08 NOTE — PLAN OF CARE
Problem: PAIN - ADULT  Goal: Verbalizes/displays adequate comfort level or baseline comfort level  Description: Interventions:  - Encourage patient to monitor pain and request assistance  - Assess pain using appropriate pain scale  - Administer analgesics as ordered based on type and severity of pain and evaluate response  - Implement non-pharmacological measures as appropriate and evaluate response  - Consider cultural and social influences on pain and pain management  - Notify physician/advanced practitioner if interventions unsuccessful or patient reports new pain  - Educate patient/family on pain management process including their role and importance of  reporting pain   - Provide non-pharmacologic/complimentary pain relief interventions  Outcome: Progressing     Problem: INFECTION - ADULT  Goal: Absence or prevention of progression during hospitalization  Description: INTERVENTIONS:  - Assess and monitor for signs and symptoms of infection  - Monitor lab/diagnostic results  - Monitor all insertion sites, i.e. indwelling lines, tubes, and drains  - Monitor endotracheal if appropriate and nasal secretions for changes in amount and color  - [place] appropriate cooling/warming therapies per order  - Administer medications as ordered  - Instruct and encourage patient and family to use good hand hygiene technique  - Identify and instruct in appropriate isolation precautions for identified infection/condition  Outcome: Progressing  Goal: Absence of fever/infection during neutropenic period  Description: INTERVENTIONS:  - Monitor WBC  - Perform strict hand hygiene  - Limit to healthy visitors only  - No plants, dried, fresh or silk flowers with neal in patient room  Outcome: Progressing     Problem: SAFETY ADULT  Goal: Patient will remain free of falls  Description: INTERVENTIONS:  - Educate patient/family on patient safety including physical limitations  - Instruct patient to call for assistance with activity   -  Consider consulting OT/PT to assist with strengthening/mobility based on AM PAC & JH-HLM score  - Consult OT/PT to assist with strengthening/mobility   - Keep Call bell within reach  - Keep bed low and locked with side rails adjusted as appropriate  - Keep care items and personal belongings within reach  - Initiate and maintain comfort rounds  - Make Fall Risk Sign visible to staff  - Offer Toileting every 2 Hours, in advance of need  - Initiate/Maintain bed alarm  - Obtain necessary fall risk management equipment: slipper socks  - Apply yellow socks and bracelet for high fall risk patients  - Consider moving patient to room near nurses station  Outcome: Progressing  Goal: Maintain or return to baseline ADL function  Description: INTERVENTIONS:  -  Assess patient's ability to carry out ADLs; assess patient's baseline for ADL function and identify physical deficits which impact ability to perform ADLs (bathing, care of mouth/teeth, toileting, grooming, dressing, etc.)  - Assess/evaluate cause of self-care deficits   - Assess range of motion  - Assess patient's mobility; develop plan if impaired  - Assess patient's need for assistive devices and provide as appropriate  - Encourage maximum independence but intervene and supervise when necessary  - Involve family in performance of ADLs  - Assess for home care needs following discharge   - Consider OT consult to assist with ADL evaluation and planning for discharge  - Provide patient education as appropriate  - Monitor functional capacity and physical performance, use of AM PAC & JH-HLM   - Monitor gait, balance and fatigue with ambulation    Outcome: Progressing  Goal: Maintains/Returns to pre admission functional level  Description: INTERVENTIONS:  - Perform AM-PAC 6 Click Basic Mobility/ Daily Activity assessment daily.  - Set and communicate daily mobility goal to care team and patient/family/caregiver.   - Collaborate with rehabilitation services on mobility goals  if consulted  - Perform Range of Motion 3 times a day.  - Reposition patient every 2 hours.  - Dangle patient 3 times a day  - Stand patient 3 times a day  - Ambulate patient 3 times a day  - Out of bed to chair 3 times a day   - Out of bed for meals 3 times a day  - Out of bed for toileting  - Record patient progress and toleration of activity level   Outcome: Progressing     Problem: DISCHARGE PLANNING  Goal: Discharge to home or other facility with appropriate resources  Description: INTERVENTIONS:  - Identify barriers to discharge w/patient and caregiver  - Arrange for needed discharge resources and transportation as appropriate  - Identify discharge learning needs (meds, wound care, etc.)  - Arrange for interpretive services to assist at discharge as needed  - Refer to Case Management Department for coordinating discharge planning if the patient needs post-hospital services based on physician/advanced practitioner order or complex needs related to functional status, cognitive ability, or social support system  Outcome: Progressing     Problem: Knowledge Deficit  Goal: Patient/family/caregiver demonstrates understanding of disease process, treatment plan, medications, and discharge instructions  Description: Complete learning assessment and assess knowledge base.  Interventions:  - Provide teaching at level of understanding  - Provide teaching via preferred learning methods  Outcome: Progressing     Problem: GASTROINTESTINAL - ADULT  Goal: Minimal or absence of nausea and/or vomiting  Description: INTERVENTIONS:  - Administer IV fluids if ordered to ensure adequate hydration  - Maintain NPO status until nausea and vomiting are resolved  - Nasogastric tube if ordered  - Administer ordered antiemetic medications as needed  - Provide nonpharmacologic comfort measures as appropriate  - Advance diet as tolerated, if ordered  - Consider nutrition services referral to assist patient with adequate nutrition and  appropriate food choices  Outcome: Progressing  Goal: Maintains or returns to baseline bowel function  Description: INTERVENTIONS:  - Assess bowel function  - Encourage oral fluids to ensure adequate hydration  - Administer IV fluids if ordered to ensure adequate hydration  - Administer ordered medications as needed  - Encourage mobilization and activity  - Consider nutritional services referral to assist patient with adequate nutrition and appropriate food choices  Outcome: Progressing  Goal: Maintains adequate nutritional intake  Description: INTERVENTIONS:  - Monitor percentage of each meal consumed  - Identify factors contributing to decreased intake, treat as appropriate  - Assist with meals as needed  - Monitor I&O, weight, and lab values if indicated  - Obtain nutrition services referral as needed  Outcome: Progressing     Problem: METABOLIC, FLUID AND ELECTROLYTES - ADULT  Goal: Electrolytes maintained within normal limits  Description: INTERVENTIONS:  - Monitor labs and assess patient for signs and symptoms of electrolyte imbalances  - Administer electrolyte replacement as ordered  - Monitor response to electrolyte replacements, including repeat lab results as appropriate  - Instruct patient on fluid and nutrition as appropriate  Outcome: Progressing  Goal: Fluid balance maintained  Description: INTERVENTIONS:  - Monitor labs   - Monitor I/O and WT  - Instruct patient on fluid and nutrition as appropriate  - Assess for signs & symptoms of volume excess or deficit  Outcome: Progressing

## 2025-06-08 NOTE — DISCHARGE SUMMARY
Discharge Summary - Hospitalist   Name: Brent Chun 64 y.o. male I MRN: [id number]  Unit/Bed#: [address] [place] [address] I Date of Admission: 6/6/2025   Date of Service: 6/8/2025 I Hospital Day: 2     Assessment & Plan  Alcohol-induced acute pancreatitis  Patient presents with abdominal pain for 2 days   history of heavy alcohol use for the past 20 years  Lipase [id number]  CT abdomen pelvis-no acute inflammation or infectious process.  New pancreatic head cystic lesion measuring 2.1 x 3 cm with stable renal cysts  Patient received aggressive IV hydration with pain medications  Patient improved symptomatically and is tolerating clear liquid diet.  Patient advance to low-fat diet which she has been tolerating well  MRI MRCP with unilocular cyst in the neck of the pancreas measuring 2.7 cm  Outpatient follow-up with advanced GI at [facility] for EUS to evaluate pancreatic cyst  Alcoholism (HCC)  History of heavy alcohol use and drinks 7 beers on a daily basis for the past 20 years  No evidence of withdrawal discussed with patient regarding alcohol abstinence    Hyponatremia  Sodium level mildly low at 131  Likely secondary dehydration in the setting of poor oral intake and suspected beer potomania  Improved with IV hydration to 133  Hyperlipidemia  Continue statin  Hypertension  Known history of hypertension  Continue Coreg 12.5 mg p.o. twice daily, Aldactazide and losartan  Atherosclerosis of native artery of both lower extremities with intermittent claudication (HCC)  Continue Zetia, aspirin and Pletal  Tobacco abuse  Smokes 1 pack/day for 40 to 50 years  Patient reports that he has allergies to nicotine patch and cannot use nicotine gum  Bilateral renal cysts  Bilateral simple and hemorrhagic renal cyst noted  Electrolyte abnormality  Resolved with repletion     Medical Problems       Resolved Problems  Date Reviewed: 4/22/2022   None       Discharging Physician / Practitioner: Vincenzo Vega MD  PCP: Lorraine García  "SHANT Yi  Admission Date:   Admission Orders (From admission, onward)       Ordered        06/06/25 1726  INPATIENT ADMISSION  Once                          Discharge Date: 06/08/25    Next Steps for Physician/AP Assuming Care:  Continue low-fat diet    Medication Changes for Discharge & Rationale:   None  See after visit summary for reconciled discharge medications provided to patient and/or family.     Consultations During Hospital Stay:  Gastroenterology      Significant Findings / Test Results:   CT abdomen pelvis without any acute inflammatory infectious process with new pancreatic head cystic lesion, stable renal cysts  MRI abdomen with unilocular cyst in the neck of the pancreas measuring 2.7 cm without any pancreatic duct dilatation, bilateral simple and hemorrhagic renal cysts    Hospital Course:   Brent Chun is a 64 y.o. male patient with past medical history of PAD, malignant hypertension, dyslipidemia, alcohol nicotine abuse who originally presented to the hospital on 6/6/2025 due to abdominal pain and noted to have acute pancreatitis.  Patient was treated symptomatically with n.p.o., IV hydration and pain medications with improved symptoms.  Patient was also seen by GI.  On MRI patient noted to have cyst in the neck of the pancreas.  Patient's diet was slowly advanced and patient was tolerating low-fat diet.  Patient was discharged home and recommended alcohol abstinence and outpatient follow-up with advanced GI for EUS.      Please see above list of diagnoses and related plan for additional information.     Discharge Day Visit / Exam:   Subjective: Patient is feeling well.  Denies any abdominal pain, nausea or vomiting.  Patient is having bowel movements  Vitals: Blood Pressure: (!) 175/74 (06/08/25 0731)  Pulse: 58 (06/08/25 0731)  Temperature: 97.6 °F (36.4 °C) (06/08/25 0731)  Temp Source: Temporal (06/07/25 0725)  Respirations: 14 (06/07/25 2249)  Height: 5' 9\" (175.3 cm) (06/06/25 " 1809)  Weight - Scale: 71.1 kg (156 lb 11.2 oz) (06/06/25 1809)  SpO2: 95 % (06/08/25 0731)  Physical Exam  Constitutional:       Appearance: Normal appearance.   HENT:      Head: Normocephalic and atraumatic.     Eyes:      Extraocular Movements: Extraocular movements intact.      Pupils: Pupils are equal, round, and reactive to light.       Cardiovascular:      Rate and Rhythm: Normal rate and regular rhythm.      Heart sounds: No murmur heard.     No gallop.   Pulmonary:      Effort: Pulmonary effort is normal.      Breath sounds: Normal breath sounds.   Abdominal:      General: Bowel sounds are normal.      Palpations: Abdomen is soft.      Tenderness: There is no abdominal tenderness.     Musculoskeletal:         General: No swelling or deformity. Normal range of motion.      Cervical back: Normal range of motion and neck supple.     Skin:     General: Skin is warm and dry.     Neurological:      General: No focal deficit present.      Mental Status: He is alert.         Discharge instructions/Information to patient and family:   See after visit summary for information provided to patient and family.      Provisions for Follow-Up Care:  See after visit summary for information related to follow-up care and any pertinent home health orders.      Mobility at time of Discharge:   Basic Mobility Inpatient Raw Score: 23  JH-HLM Goal: 7: Walk 25 feet or more  JH-HLM Achieved: 7: Walk 25 feet or more  HLM Goal achieved. Continue to encourage appropriate mobility.     Disposition:   Home    Planned Readmission: No    Administrative Statements   Discharge Statement:  I have spent a total time of 35 minutes in caring for this patient on the day of the visit/encounter. .    **Please Note: This note may have been constructed using a voice recognition system**

## 2025-06-08 NOTE — ASSESSMENT & PLAN NOTE
History of heavy alcohol use and drinks 7 beers on a daily basis for the past 20 years  No evidence of withdrawal discussed with patient regarding alcohol abstinence

## 2025-06-08 NOTE — PLAN OF CARE
Problem: PAIN - ADULT  Goal: Verbalizes/displays adequate comfort level or baseline comfort level  Description: Interventions:  - Encourage patient to monitor pain and request assistance  - Assess pain using appropriate pain scale  - Administer analgesics as ordered based on type and severity of pain and evaluate response  - Implement non-pharmacological measures as appropriate and evaluate response  - Consider cultural and social influences on pain and pain management  - Notify physician/advanced practitioner if interventions unsuccessful or patient reports new pain  - Educate patient/family on pain management process including their role and importance of  reporting pain   - Provide non-pharmacologic/complimentary pain relief interventions  Outcome: Progressing     Problem: INFECTION - ADULT  Goal: Absence or prevention of progression during hospitalization  Description: INTERVENTIONS:  - Assess and monitor for signs and symptoms of infection  - Monitor lab/diagnostic results  - Monitor all insertion sites, i.e. indwelling lines, tubes, and drains  - Monitor endotracheal if appropriate and nasal secretions for changes in amount and color  - [place] appropriate cooling/warming therapies per order  - Administer medications as ordered  - Instruct and encourage patient and family to use good hand hygiene technique  - Identify and instruct in appropriate isolation precautions for identified infection/condition  Outcome: Progressing  Goal: Absence of fever/infection during neutropenic period  Description: INTERVENTIONS:  - Monitor WBC  - Perform strict hand hygiene  - Limit to healthy visitors only  - No plants, dried, fresh or silk flowers with neal in patient room  Outcome: Progressing     Problem: SAFETY ADULT  Goal: Patient will remain free of falls  Description: INTERVENTIONS:  - Educate patient/family on patient safety including physical limitations  - Instruct patient to call for assistance with activity   -  Consider consulting OT/PT to assist with strengthening/mobility based on AM PAC & JH-HLM score  - Consult OT/PT to assist with strengthening/mobility   - Keep Call bell within reach  - Keep bed low and locked with side rails adjusted as appropriate  - Keep care items and personal belongings within reach  - Initiate and maintain comfort rounds  - Make Fall Risk Sign visible to staff  - Offer Toileting every 2 Hours, in advance of need  - Initiate/Maintain bed alarm  - Obtain necessary fall risk management equipment: slipper socks  - Apply yellow socks and bracelet for high fall risk patients  - Consider moving patient to room near nurses station  Outcome: Progressing  Goal: Maintain or return to baseline ADL function  Description: INTERVENTIONS:  -  Assess patient's ability to carry out ADLs; assess patient's baseline for ADL function and identify physical deficits which impact ability to perform ADLs (bathing, care of mouth/teeth, toileting, grooming, dressing, etc.)  - Assess/evaluate cause of self-care deficits   - Assess range of motion  - Assess patient's mobility; develop plan if impaired  - Assess patient's need for assistive devices and provide as appropriate  - Encourage maximum independence but intervene and supervise when necessary  - Involve family in performance of ADLs  - Assess for home care needs following discharge   - Consider OT consult to assist with ADL evaluation and planning for discharge  - Provide patient education as appropriate  - Monitor functional capacity and physical performance, use of AM PAC & JH-HLM   - Monitor gait, balance and fatigue with ambulation    Outcome: Progressing  Goal: Maintains/Returns to pre admission functional level  Description: INTERVENTIONS:  - Perform AM-PAC 6 Click Basic Mobility/ Daily Activity assessment daily.  - Set and communicate daily mobility goal to care team and patient/family/caregiver.   - Collaborate with rehabilitation services on mobility goals  if consulted  - Perform Range of Motion 3 times a day.  - Reposition patient every 2 hours.  - Dangle patient 3 times a day  - Stand patient 3 times a day  - Ambulate patient 3 times a day  - Out of bed to chair 3 times a day   - Out of bed for meals 3 times a day  - Out of bed for toileting  - Record patient progress and toleration of activity level   Outcome: Progressing     Problem: DISCHARGE PLANNING  Goal: Discharge to home or other facility with appropriate resources  Description: INTERVENTIONS:  - Identify barriers to discharge w/patient and caregiver  - Arrange for needed discharge resources and transportation as appropriate  - Identify discharge learning needs (meds, wound care, etc.)  - Arrange for interpretive services to assist at discharge as needed  - Refer to Case Management Department for coordinating discharge planning if the patient needs post-hospital services based on physician/advanced practitioner order or complex needs related to functional status, cognitive ability, or social support system  Outcome: Progressing     Problem: Knowledge Deficit  Goal: Patient/family/caregiver demonstrates understanding of disease process, treatment plan, medications, and discharge instructions  Description: Complete learning assessment and assess knowledge base.  Interventions:  - Provide teaching at level of understanding  - Provide teaching via preferred learning methods  Outcome: Progressing     Problem: GASTROINTESTINAL - ADULT  Goal: Minimal or absence of nausea and/or vomiting  Description: INTERVENTIONS:  - Administer IV fluids if ordered to ensure adequate hydration  - Maintain NPO status until nausea and vomiting are resolved  - Nasogastric tube if ordered  - Administer ordered antiemetic medications as needed  - Provide nonpharmacologic comfort measures as appropriate  - Advance diet as tolerated, if ordered  - Consider nutrition services referral to assist patient with adequate nutrition and  appropriate food choices  Outcome: Progressing  Goal: Maintains or returns to baseline bowel function  Description: INTERVENTIONS:  - Assess bowel function  - Encourage oral fluids to ensure adequate hydration  - Administer IV fluids if ordered to ensure adequate hydration  - Administer ordered medications as needed  - Encourage mobilization and activity  - Consider nutritional services referral to assist patient with adequate nutrition and appropriate food choices  Outcome: Progressing  Goal: Maintains adequate nutritional intake  Description: INTERVENTIONS:  - Monitor percentage of each meal consumed  - Identify factors contributing to decreased intake, treat as appropriate  - Assist with meals as needed  - Monitor I&O, weight, and lab values if indicated  - Obtain nutrition services referral as needed  Outcome: Progressing     Problem: METABOLIC, FLUID AND ELECTROLYTES - ADULT  Goal: Electrolytes maintained within normal limits  Description: INTERVENTIONS:  - Monitor labs and assess patient for signs and symptoms of electrolyte imbalances  - Administer electrolyte replacement as ordered  - Monitor response to electrolyte replacements, including repeat lab results as appropriate  - Instruct patient on fluid and nutrition as appropriate  Outcome: Progressing  Goal: Fluid balance maintained  Description: INTERVENTIONS:  - Monitor labs   - Monitor I/O and WT  - Instruct patient on fluid and nutrition as appropriate  - Assess for signs & symptoms of volume excess or deficit  Outcome: Progressing

## 2025-06-08 NOTE — NURSING NOTE
AVS reviewed with patient including follow up appointments, discharge instructions and medications. Patient states understanding. Discharged to home with belongings. Security returned $1000 in cash to patient prior to discharge.

## 2025-06-08 NOTE — ASSESSMENT & PLAN NOTE
Smokes 1 pack/day for 40 to 50 years  Patient reports that he has allergies to nicotine patch and cannot use nicotine gum